# Patient Record
Sex: FEMALE | Race: WHITE | NOT HISPANIC OR LATINO | Employment: OTHER | URBAN - METROPOLITAN AREA
[De-identification: names, ages, dates, MRNs, and addresses within clinical notes are randomized per-mention and may not be internally consistent; named-entity substitution may affect disease eponyms.]

---

## 2018-05-23 DIAGNOSIS — M25.519 SHOULDER PAIN: Primary | ICD-10-CM

## 2018-05-28 ENCOUNTER — CLINICAL SUPPORT (OUTPATIENT)
Dept: REHABILITATION | Facility: HOSPITAL | Age: 68
End: 2018-05-28
Payer: MEDICARE

## 2018-05-28 DIAGNOSIS — G89.29 CHRONIC RIGHT SHOULDER PAIN: ICD-10-CM

## 2018-05-28 DIAGNOSIS — M25.611 DECREASED ROM OF RIGHT SHOULDER: ICD-10-CM

## 2018-05-28 DIAGNOSIS — M25.511 CHRONIC RIGHT SHOULDER PAIN: ICD-10-CM

## 2018-05-28 DIAGNOSIS — M62.81 MUSCLE WEAKNESS OF RIGHT UPPER EXTREMITY: ICD-10-CM

## 2018-05-28 PROCEDURE — G8984 CARRY CURRENT STATUS: HCPCS | Mod: CJ,PN

## 2018-05-28 PROCEDURE — 97161 PT EVAL LOW COMPLEX 20 MIN: CPT | Mod: PN

## 2018-05-28 PROCEDURE — G8985 CARRY GOAL STATUS: HCPCS | Mod: CH,PN

## 2018-05-28 NOTE — PLAN OF CARE
OUTPATIENT THERAPY AND WELLNESS  PHYSICAL THERAPY INITIAL EVALUATION    Name: Marleni Sousa  Clinic Number: [id number]    Evaluation Date: 5/28/2018  Visit #: 1 / 12  Authorization period Expiration: 12/31/2018  Plan of Care Expiration: 6/29/2018    Diagnosis:   Encounter Diagnoses   Name Primary?    Chronic right shoulder pain     Decreased ROM of right shoulder     Muscle weakness of right upper extremity      Physician: Catrachito Woods MD  Treatment Orders: PT Eval and Treat  PMHX: Right shoulder RTC tear/repair; Gastritis; Neurogenic Bladder; Trigeminal Neuralgia  Medicines:Cipro secondary to recent bladder surgery; Prednisone for inflammation; Flexeril  Review of patient's allergies indicates:: Lyrica; Benedryl;     History   Prior Therapy: Previous patient at this clinic before and after her Right shoulder RTC Repair and repair of Glenoid Fracture in February 2016  Social History: Patient lives alone; able to attend to her own needs; single story home;   Previous functional status: patient reports that she stays fairly active, does not perform any dedicated exercises;   Current functional status: sedentary; has not kept up with her shoulder exercises or any stretching. Recent bladder surgery -botox as well as other medical issues have kept her from doing too much.  Work: patient is retired    Subjective   History of Present Illness: patient stated that she started to notice a decrease in her right shoulder mobility around November of 2017.  Received 2 steroid injections - one in December 2017 and another one when she went to see Dr. Woods a week ago. She reports no falls or injury to her shoulder;   DOI: November 2017  Imaging, none:   Pain: current 5/10, worst 6/10, best 1/10, Aching, intermittent  Radicular symptoms: none identified  Aggravating factors: moving my arm  Easing factors: resting  Precautions: none  Pts goals: To regain my ROM and improve my strength.     Objective   Mental status:  alert, interactive, oriented x3  Posture/ Alignment: Good ; right shoulder noted to be more forward than left.    GAIT DEVIATIONS: Marleni amb with no obvious gait deviations noted. .    ROM:   Shoulder  Right   Left  Pain/Dysfunction with Movement    AROM PROM MMT AROM PROM MMT      flexion   135   146   4/5   162 WNL 5/5   End range pain   extension    43   55   4+/5   50 WNL 5/5   No c/o    abduction   130   138   4/5   160 WNL 5/5   End range pain   adduction   Able to reach to left post deltoid   28   4+/5  Able to reach behind right shoulder WNL 5/5   End range pain   Internal rotation Hand behind back to T8   74  4+/5  Hand behind back to Right scapula WNL 5/5   End range pain and restriction   ER at 90° abd Hand behind head   45   4-/5 hand behind head to  T-3  WNL 5/5   Pain thru movement   ER at 0° abd   46   76   4/5   85 WNL 5/5   End range pain     Strength: See note above; Scapular Stabilizer mm strength: 4/5;   Special Tests:  Negative for any RTC tear or labrum involvement; + impingement  Palpation:  Tender anterior aspect of right shoulder over long head of biceps;     Joint Play:  Able to perform grade III G/H joint mobilization with decreased joint play into IR/ER and distal glides; springy end feel noted; GIRD noted along with posterior capsule tightness    Pt/family was provided educational information, including: role of PT, goals for PT, scheduling - pt verbalized understanding. Discussed insurance plan with pt.     Functional Limitations Reporting     Category: carrying  Tool: UEFS   Score: 39% Limitation   Current/ : CJ = at least 20% but < 40% impaired, limited or restricted  Goal/ : CH = 0 % impaired, limited or restricted       Modifier  Impairment Limitation Restriction    CH  0 % impaired, limited or restricted    CI  @ least 1% but less than 20% impaired, limited or restricted    CJ  @ least 20%<40% impaired, limited or restricted    CK  @ least 40%<60% impaired, limited or  restricted    CL  @ least 60% <80% impaired, limited or restricted    CM  @ least 80%<100% impaired limited or restricted    CN  100% impaired, limited or restricted     TREATMENT   Time In: 10:10 AM  Time Out: 11:00 AM    Discussed Plan of Care with patient: Yes      Written Home Exercises Provided: yes - instructed patient to get back on her pulleys and start stretching her shoulder  Exercises were reviewed and Marleni was able to demonstrate them prior to the end of the session. Pt received a written copy of exercises to perform at home. Marleni demonstrated good  understanding of the education provided.     Assessment   Marleni is a 68 y.o. female referred to outpatient physical therapy with a medical diagnosis of right shoulder pain with decreased mobility due to inflammation, stiffness and scapulary dyskinesia. Demonstrates impairments including limitations as described in the problem list. Pt prognosis is Good. Positive prognostic factors include patient is familiar with shoulder rehab . Negative prognostic factors include none identified. Pt will benefit from skilled outpatient physical therapy to address the above stated deficits, provide pt/family education, and to maximize pt's level of independence.     History  Co-morbidities and personal factors that may impact the plan of care Examination  Body Structures and Functions, activity limitations and participation restrictions that may impact the plan of care    Clinical Presentation   Co-morbidities:   previous shoulder surgery; neurogenic bladder; trigeminel neuralgia, gastritis        Personal Factors:   no deficits Body Regions:   right shoulder    Body Systems:    ROM  strength            Participation Restrictions:   None identified     Activity limitations:   Learning and applying knowledge  no deficits    General Tasks and Commands  no deficits    Communication  no deficits    Mobility  lifting and carrying objects    Self care  no  deficits    Domestic Life  cooking  doing house work (cleaning house, washing dishes, laundry)    Interactions/Relationships  no deficits    Life Areas  no deficits    Community and Social Life  no deficits         stable and uncomplicated                      low   low  low Decision Making/ Complexity Score:  low         Anticipated Barriers for therapy: none identified  Pt's spiritual, cultural and educational needs considered and pt agreeable to plan of care and goals as stated below:     Short Term GOALS:  In 2 weeks, pt. will:  1. Subjective pain no more than 2/10 with daily activity  2. Able to demonstrate functional AROM in right shoulder thru all planes of motion  3. Strength improved by 1/2 grade in flexion, abduction and ER  4. UEFS score improved to <20% limitation  Long Term GOALS:  In 4 weeks, pt. Will:  1. Report subjective pain 0/10 in right shoulder with daily use  2. Demonstrate 5/5 strength in Right shoulder in all planes  3. UEFS score with 0% limitation  4. be independent with HEP and SX management     Plan   Certification Period: 5/28/2018 to 6/29/2018.    Outpatient physical therapy 2-3 times weekly to include: Manual Therapy, Moist Heat/ Ice, Neuromuscular Re-ed, Patient Education and Therapeutic Exercise. Cont PT for 1 months.   Pt may be seen by PTA as part of the rehabilitation team.     I certify the need for these services furnished under this plan of treatment and while under my care.    Sofía Martines, PT

## 2018-05-29 ENCOUNTER — CLINICAL SUPPORT (OUTPATIENT)
Dept: REHABILITATION | Facility: HOSPITAL | Age: 68
End: 2018-05-29
Payer: MEDICARE

## 2018-05-29 DIAGNOSIS — M25.611 DECREASED ROM OF RIGHT SHOULDER: ICD-10-CM

## 2018-05-29 DIAGNOSIS — G89.29 CHRONIC RIGHT SHOULDER PAIN: Primary | ICD-10-CM

## 2018-05-29 DIAGNOSIS — M25.511 CHRONIC RIGHT SHOULDER PAIN: Primary | ICD-10-CM

## 2018-05-29 DIAGNOSIS — M62.81 MUSCLE WEAKNESS OF RIGHT UPPER EXTREMITY: ICD-10-CM

## 2018-05-29 PROCEDURE — 97110 THERAPEUTIC EXERCISES: CPT | Mod: PN

## 2018-05-29 PROCEDURE — 97140 MANUAL THERAPY 1/> REGIONS: CPT | Mod: PN

## 2018-05-29 NOTE — PROGRESS NOTES
"                            Physical Therapy Daily Treatment Note     Name: Marleni Carpio [facility]  Clinic Number: [id number]    Therapy Diagnosis:  Physician: Catrachito Woods MD    Physician Orders: PT Eval and Tx  Medical Diagnosis: s/p RTC Repair, Stiffness, scapular dyskinesia Right Shoulder   Evaluation Date: 18  Authorization period Expiration: 18  Plan of Care Certification Period: 18    Visit #: 2/ Visits authorized: 12  Time In: 8:05 AM  Time Out: 9:15 AM  Total Billable Time: 45 minutes    Precautions: Standard    Subjective     Pt reports: she was compliant with home exercise program given last session.   Response to previous treatment:"My shoulder is just alittle sore"  Functional change: none    Pain: 4/10  Location: shoulder  right    Objective     HOMA received therapeutic exercises to develop strength and ROM for 30 minutes includin. UBE x 10 mins  2. Serratus Punches : 2# x 15 reps  3. Sidelying Ext Rotation: 2# x 15 reps  4. Prone: extension, rows and abduction: 2# x 15 reps each  5. Corner stretches for Pec Minor: 30 sec hold x 3 reps  6. Wall Slides - 3 mins  7. Wall Push-ups x 15 reps  8. 3-way Scapular Retraction - black TB 15 reps each  9. Forearm wall slides x 10 reps      HOMA received the following manual therapy techniques: Joint mobilizations were applied to the: right shoulder for 15 minutes, including:  Glenohumeral grade II to III jt mobilization to all planes;  Posterior capsule stretch; STM-sub-scapularis release; contract-relax shoulder abduction in side lying with stm of infraspinatous to promote greater ROM.      HOMA participated in neuromuscular re-education activities to improve:  for  minutes, including:     HOMA received the following supervised modalities after being cleared for contradictions: Moist Heat x 15 mins to right shoulder    Home Exercises Provided and Patient Education Provided     Education provided:   - progress towards goals   - role of " therapy       Written Home Exercises Provided: corner stretches, wall slides and pulleys to be performed daily.  Exercises were reviewed and   HOMA was able to demonstrate them prior to the end of the session.   Pt received a written copy of exercises to perform at home.     HOMA demonstrated good  understanding of the education provided.     Assessment       HOMA is progressing well towards her goals. Needs to focus on moving her shoulder through available ROM on a daily basis to reduce chance of developing a frozen shoulder.  Pt prognosis is Good.     Pt will continue to benefit from skilled outpatient physical therapy to address the deficits listed in the problem list box on initial evaluation, provide pt/family education and to maximize pt's level of independence in the home and community environment.     Anticipated barriers to physical therapy:     Pt's spiritual, cultural and educational needs considered and pt agreeable to plan of care and goals.    Goals:     Short Term GOALS:  In 2 weeks, pt. will:  1. Subjective pain no more than 2/10 with daily activity  2. Able to demonstrate functional AROM in right shoulder thru all planes of motion  3. Strength improved by 1/2 grade in flexion, abduction and ER  4. UEFS score improved to <20% limitation  Long Term GOALS:  In 4 weeks, pt. Will:  1. Report subjective pain 0/10 in right shoulder with daily use  2. Demonstrate 5/5 strength in Right shoulder in all planes  3. UEFS score with 0% limitation  4. be independent with HEP and SX management   Plan     Homa will be seen 2-3x/week x 4 weeks for right shoulder manual therapy, there exercise and modalities as needed for pain and.or inflammation.    Sofía Martines, PT

## 2018-06-01 ENCOUNTER — CLINICAL SUPPORT (OUTPATIENT)
Dept: REHABILITATION | Facility: HOSPITAL | Age: 68
End: 2018-06-01
Payer: MEDICARE

## 2018-06-01 DIAGNOSIS — G89.29 CHRONIC RIGHT SHOULDER PAIN: Primary | ICD-10-CM

## 2018-06-01 DIAGNOSIS — M25.611 DECREASED ROM OF RIGHT SHOULDER: ICD-10-CM

## 2018-06-01 DIAGNOSIS — M62.81 MUSCLE WEAKNESS OF RIGHT UPPER EXTREMITY: ICD-10-CM

## 2018-06-01 DIAGNOSIS — M25.511 CHRONIC RIGHT SHOULDER PAIN: Primary | ICD-10-CM

## 2018-06-01 PROCEDURE — 97140 MANUAL THERAPY 1/> REGIONS: CPT | Mod: PN

## 2018-06-01 PROCEDURE — 97110 THERAPEUTIC EXERCISES: CPT | Mod: PN

## 2018-06-01 NOTE — PROGRESS NOTES
"                            Physical Therapy Daily Treatment Note     Name: Marleni Carpio [facility] Number: [id number]    Therapy Diagnosis:  Physician: Catrachito Woods MD    Physician Orders: PT Eval and Tx  Medical Diagnosis: s/p RTC Repair, Stiffness, scapular dyskinesia Right Shoulder   Evaluation Date: 18  Authorization period Expiration: 18  Plan of Care Certification Period: 18    Visit #: 3/ Visits authorized: 12  Time In: 10:00  Time Out: 11:00  Total Billable Time: 45 minutes    Precautions: Standard    Subjective     Pt reports: she was compliant with home exercise program given last session.  She went to do some aquatic exercise yesterday - alittle sore from this as well.  Response to previous treatment:"My shoulder is just alittle sore"  Functional change: none    Pain: 4/10  Location: shoulder  right    Objective     HOMA received therapeutic exercises to develop strength and ROM for 30 minutes includin. UBE x 10 mins  2. Serratus Punches : 2# x 15 reps  3. Sidelying Ext Rotation: 2# x 15 reps  4. Prone: extension, rows and abduction: 2# x 15 reps each  5. Corner stretches for Pec Minor: 30 sec hold x 3 reps  6. Wall Slides - 3 mins  7. Wall Push-ups x 15 reps  8. 3-way Scapular Retraction - black TB 15 reps each  9. Forearm wall slides x 10 reps      HOMA received the following manual therapy techniques: Joint mobilizations were applied to the: right shoulder for 15 minutes, including:  Glenohumeral grade II to III jt mobilization to all planes;  Posterior capsule stretch; STM-sub-scapularis release; contract-relax shoulder abduction in side lying with stm of infraspinatous to promote greater ROM.      HOMA participated in neuromuscular re-education activities to improve:  for  minutes, including:     HOMA received the following supervised modalities after being cleared for contradictions: Moist Heat x 15 mins to right shoulder    Home Exercises Provided and Patient Education " Provided     Education provided:   - progress towards goals   - role of therapy       Written Home Exercises Provided: corner stretches, wall slides and pulleys to be performed daily.  Exercises were reviewed and   HOMA was able to demonstrate them prior to the end of the session.   Pt received a written copy of exercises to perform at home.     HOMA demonstrated good  understanding of the education provided.     Assessment       HOMA is progressing well towards her goals. Needs to focus on moving her shoulder through available ROM on a daily basis to reduce chance of developing a frozen shoulder.  Pt prognosis is Good.     Pt will continue to benefit from skilled outpatient physical therapy to address the deficits listed in the problem list box on initial evaluation, provide pt/family education and to maximize pt's level of independence in the home and community environment.     Anticipated barriers to physical therapy:     Pt's spiritual, cultural and educational needs considered and pt agreeable to plan of care and goals.    Goals:     Short Term GOALS:  In 2 weeks, pt. will:  1. Subjective pain no more than 2/10 with daily activity  2. Able to demonstrate functional AROM in right shoulder thru all planes of motion  3. Strength improved by 1/2 grade in flexion, abduction and ER  4. UEFS score improved to <20% limitation  Long Term GOALS:  In 4 weeks, pt. Will:  1. Report subjective pain 0/10 in right shoulder with daily use  2. Demonstrate 5/5 strength in Right shoulder in all planes  3. UEFS score with 0% limitation  4. be independent with HEP and SX management   Plan     Homa will be seen 2-3x/week x 4 weeks for right shoulder manual therapy, there exercise and modalities as needed for pain and.or inflammation.    Sofía Martines, PT

## 2018-06-04 ENCOUNTER — CLINICAL SUPPORT (OUTPATIENT)
Dept: REHABILITATION | Facility: HOSPITAL | Age: 68
End: 2018-06-04
Payer: MEDICARE

## 2018-06-04 DIAGNOSIS — M25.611 DECREASED ROM OF RIGHT SHOULDER: ICD-10-CM

## 2018-06-04 DIAGNOSIS — M62.81 MUSCLE WEAKNESS OF RIGHT UPPER EXTREMITY: ICD-10-CM

## 2018-06-04 DIAGNOSIS — G89.29 CHRONIC RIGHT SHOULDER PAIN: Primary | ICD-10-CM

## 2018-06-04 DIAGNOSIS — M25.511 CHRONIC RIGHT SHOULDER PAIN: Primary | ICD-10-CM

## 2018-06-04 PROCEDURE — 97140 MANUAL THERAPY 1/> REGIONS: CPT | Mod: PN

## 2018-06-04 PROCEDURE — 97110 THERAPEUTIC EXERCISES: CPT | Mod: PN

## 2018-06-04 NOTE — PROGRESS NOTES
"                            Physical Therapy Daily Treatment Note     Name: Marleni Carpio [facility]  Clinic Number: [id number]    Therapy Diagnosis:  Physician: Catrachito Woods MD    Physician Orders: PT Eval and Tx  Medical Diagnosis: s/p RTC Repair, Stiffness, scapular dyskinesia Right Shoulder   Evaluation Date: 18  Authorization period Expiration: 18  Plan of Care Certification Period: 18    Visit #: 4/ Visits authorized: 12  Time In: 9:00 AM  Time Out: 10: 20 AM  Total Billable Time: 45 minutes    Precautions: Standard    Subjective     Pt reports: she was compliant with home exercise program given last session - as far as pulleys concerned. She stated that she hasn't started using her bands yet. Encouraged her to start with same exercises she is doing in the clinic.   Response to previous treatment:"My shoulder is just alittle sore"  Functional change: none    Pain: 4/10  Location: shoulder  right    Objective     HOMA received therapeutic exercises to develop strength and ROM for 30 minutes includin. UBE x 10 mins  2. Serratus Punches : 2# x 15 reps  3. Sidelying Ext Rotation: 2# x 15 reps  4. Prone: extension, rows and abduction: 2# x 15 reps each  5. Corner stretches for Pec Minor: 30 sec hold x 3 reps  6. Wall Slides - 3 mins  7. Wall Push-ups x 15 reps  8. 3-way Scapular Retraction - black TB 15 reps each  9. Forearm wall slides x 10 reps  10. Standing shoulder IR/ER:  Green TB - 15 reps each      HOMA received the following manual therapy techniques: Joint mobilizations were applied to the: right shoulder for 15 minutes, including:  Glenohumeral grade II to III jt mobilization to all planes;  Posterior capsule stretch; STM-sub-scapularis release; contract-relax shoulder abduction in side lying with stm of infraspinatous to promote greater ROM.      HOMA participated in neuromuscular re-education activities to improve:  for  minutes, including:     HOMA received the following supervised " modalities after being cleared for contradictions: Moist Heat x 15 mins to right shoulder    Home Exercises Provided and Patient Education Provided     Education provided:   - progress towards goals   - role of therapy       Written Home Exercises Provided: corner stretches, wall slides and pulleys to be performed daily.  Exercises were reviewed and   HOMA was able to demonstrate them prior to the end of the session.   Pt received a written copy of exercises to perform at home.     HOMA demonstrated good  understanding of the education provided.     Assessment       HOMA is progressing well towards her goals.Reminder to continue to work on shoulder ROM - Homa is not good with follow through of exercises.  Needs to focus on moving her shoulder through available ROM on a daily basis to reduce chance of developing a frozen shoulder.  Pt prognosis is Good.     Pt will continue to benefit from skilled outpatient physical therapy to address the deficits listed in the problem list box on initial evaluation, provide pt/family education and to maximize pt's level of independence in the home and community environment.     Anticipated barriers to physical therapy:     Pt's spiritual, cultural and educational needs considered and pt agreeable to plan of care and goals.    Goals:     Short Term GOALS:  In 2 weeks, pt. will:  1. Subjective pain no more than 2/10 with daily activity  2. Able to demonstrate functional AROM in right shoulder thru all planes of motion  3. Strength improved by 1/2 grade in flexion, abduction and ER  4. UEFS score improved to <20% limitation  Long Term GOALS:  In 4 weeks, pt. Will:  1. Report subjective pain 0/10 in right shoulder with daily use  2. Demonstrate 5/5 strength in Right shoulder in all planes  3. UEFS score with 0% limitation  4. be independent with HEP and SX management   Plan     Homa will be seen 2-3x/week x 4 weeks for right shoulder manual therapy, there exercise and modalities as  needed for pain and.or inflammation.    Sofía Martines, PT

## 2018-06-07 ENCOUNTER — CLINICAL SUPPORT (OUTPATIENT)
Dept: REHABILITATION | Facility: HOSPITAL | Age: 68
End: 2018-06-07
Payer: MEDICARE

## 2018-06-07 DIAGNOSIS — M25.611 DECREASED ROM OF RIGHT SHOULDER: ICD-10-CM

## 2018-06-07 DIAGNOSIS — G89.29 CHRONIC RIGHT SHOULDER PAIN: Primary | ICD-10-CM

## 2018-06-07 DIAGNOSIS — M62.81 MUSCLE WEAKNESS OF RIGHT UPPER EXTREMITY: ICD-10-CM

## 2018-06-07 DIAGNOSIS — M25.511 CHRONIC RIGHT SHOULDER PAIN: Primary | ICD-10-CM

## 2018-06-07 PROCEDURE — 97110 THERAPEUTIC EXERCISES: CPT | Mod: PN

## 2018-06-07 PROCEDURE — 97140 MANUAL THERAPY 1/> REGIONS: CPT | Mod: PN

## 2018-06-07 NOTE — PROGRESS NOTES
"                            Physical Therapy Daily Treatment Note     Name: Marleni Carpio [facility]  Clinic Number: [id number]    Therapy Diagnosis:  Physician: Catrachito Woods MD    Physician Orders: PT Eval and Tx  Medical Diagnosis: s/p RTC Repair, Stiffness, scapular dyskinesia Right Shoulder   Evaluation Date: 18  Authorization period Expiration: 18  Plan of Care Certification Period: 18    Visit #: 5/ Visits authorized: 12  Time In: 10:10 AM  Time Out: 11:20  Total Billable Time: 30 minutes    Precautions: Standard    Subjective     Pt reports: she was compliant with home exercise program given last session - as far as pulleys concerned. She has performed some aquatic exercises over the last week with her shoulder.    Response to previous treatment:"My shoulder is just alittle sore"  Functional change: none    Pain: 4/10  Location: shoulder  right    Objective     HOMA received therapeutic exercises to develop strength and ROM for 30 minutes includin. UBE x 10 mins  2. Serratus Punches : 2# x 15 reps  3. Sidelying Ext Rotation: 2# x 15 reps  4. Prone: extension, rows and abduction: 2# x 15 reps each  5. Corner stretches for Pec Minor: 30 sec hold x 3 reps  6. Wall Slides - 3 mins  7. Wall Push-ups x 15 reps  8. 3-way Scapular Retraction - black TB 15 reps each  9. Forearm wall slides x 10 reps  10. Standing shoulder IR/ER:  Green TB - 15 reps each      HOMA received the following manual therapy techniques: Joint mobilizations were applied to the: right shoulder for 15 minutes, including:  Glenohumeral grade II to III jt mobilization to all planes;  Posterior capsule stretch; STM-sub-scapularis release; contract-relax shoulder abduction in side lying with stm of infraspinatous to promote greater ROM.      HOMA participated in neuromuscular re-education activities to improve:  for  minutes, including:     HOMA received the following supervised modalities after being cleared for contradictions: " Moist Heat x 15 mins to right shoulder    Home Exercises Provided and Patient Education Provided     Education provided:   - progress towards goals   - role of therapy       Written Home Exercises Provided: corner stretches, wall slides and pulleys to be performed daily.  Exercises were reviewed and   HOMA was able to demonstrate them prior to the end of the session.   Pt received a written copy of exercises to perform at home.     HOMA demonstrated good  understanding of the education provided.     Assessment       HOMA is progressing well towards her goals.Reminder to continue to work on shoulder ROM - Homa is not good with follow through of exercises.  Needs to focus on moving her shoulder through available ROM on a daily basis to reduce chance of developing a frozen shoulder.  Pt prognosis is Good.     Pt will continue to benefit from skilled outpatient physical therapy to address the deficits listed in the problem list box on initial evaluation, provide pt/family education and to maximize pt's level of independence in the home and community environment.     Anticipated barriers to physical therapy:     Pt's spiritual, cultural and educational needs considered and pt agreeable to plan of care and goals.    Goals:     Short Term GOALS:  In 2 weeks, pt. will:  1. Subjective pain no more than 2/10 with daily activity  2. Able to demonstrate functional AROM in right shoulder thru all planes of motion  3. Strength improved by 1/2 grade in flexion, abduction and ER  4. UEFS score improved to <20% limitation  Long Term GOALS:  In 4 weeks, pt. Will:  1. Report subjective pain 0/10 in right shoulder with daily use  2. Demonstrate 5/5 strength in Right shoulder in all planes  3. UEFS score with 0% limitation  4. be independent with HEP and SX management   Plan     Homa will be seen 2-3x/week x 4 weeks for right shoulder manual therapy, there exercise and modalities as needed for pain and.or inflammation.    Sofía Martines, PT

## 2018-06-11 ENCOUNTER — CLINICAL SUPPORT (OUTPATIENT)
Dept: REHABILITATION | Facility: HOSPITAL | Age: 68
End: 2018-06-11
Payer: MEDICARE

## 2018-06-11 DIAGNOSIS — M62.81 MUSCLE WEAKNESS OF RIGHT UPPER EXTREMITY: Primary | ICD-10-CM

## 2018-06-11 DIAGNOSIS — M25.611 DECREASED ROM OF RIGHT SHOULDER: ICD-10-CM

## 2018-06-11 PROCEDURE — 97010 HOT OR COLD PACKS THERAPY: CPT | Mod: PN

## 2018-06-11 PROCEDURE — 97140 MANUAL THERAPY 1/> REGIONS: CPT | Mod: PN

## 2018-06-11 PROCEDURE — 97110 THERAPEUTIC EXERCISES: CPT | Mod: PN

## 2018-06-11 NOTE — PROGRESS NOTES
"                            Physical Therapy Daily Treatment Note     Name: Marlein Carpio [facility] Number: [id number]    Therapy Diagnosis:  Physician: Catrachito Woods MD    Physician Orders: PT Eval and Tx  Medical Diagnosis: s/p RTC Repair, Stiffness, scapular dyskinesia Right Shoulder   Evaluation Date: 18  Authorization period Expiration: 18  Plan of Care Certification Period: 18    Visit #: 6/ Visits authorized: 12  Time In: 8:00 AM  Time Out: 9:45  Total Billable Time: 45 minutes    Precautions: Standard    Subjective     Pt reports: No new c/o's   Response to previous treatment:"My shoulder is just alittle sore"  Functional change: none    Pain: 510  Location: shoulder  right    Objective     HOMA received therapeutic exercises to develop strength and ROM for 30 minutes includin. UBE x 10 mins  2. Serratus Punches : 2# x 15 reps  3. Sidelying Ext Rotation: 2# x 15 reps  4. Prone: extension, rows and abduction: 2# x 15 reps each  5. Corner stretches for Pec Minor: 30 sec hold x 3 reps  6. Wall Slides - 3 mins  7. Wall Push-ups x 15 reps  8. 3-way Scapular Retraction - black TB 15 reps each  9. Forearm wall slides x 10 reps  10. Standing shoulder IR/ER:  Green TB - 15 reps each      HOMA received the following manual therapy techniques: Joint mobilizations were applied to the: right shoulder for 15 minutes, including:  Glenohumeral grade II to III jt mobilization to all planes;  Posterior capsule stretch; STM-sub-scapularis release; contract-relax shoulder abduction in side lying with stm of infraspinatous to promote greater ROM.      HOMA participated in neuromuscular re-education activities to improve:  for  minutes, including:     HOMA received the following supervised modalities after being cleared for contradictions: Moist Heat x 15 mins to right shoulder    Home Exercises Provided and Patient Education Provided     Education provided:   - progress towards goals   - role of " therapy       Written Home Exercises Provided: corner stretches, wall slides and pulleys to be performed daily.  Exercises were reviewed and   HOMA was able to demonstrate them prior to the end of the session.   Pt received a written copy of exercises to perform at home.     HOMA demonstrated good  understanding of the education provided.     Assessment       HOMA is progressing well towards her goals.Reminder to continue to work on shoulder ROM - Homa is not good with follow through of exercises.  Needs to focus on moving her shoulder through available ROM on a daily basis to reduce chance of developing a frozen shoulder.  Pt prognosis is Good.     Pt will continue to benefit from skilled outpatient physical therapy to address the deficits listed in the problem list box on initial evaluation, provide pt/family education and to maximize pt's level of independence in the home and community environment.     Anticipated barriers to physical therapy:     Pt's spiritual, cultural and educational needs considered and pt agreeable to plan of care and goals.    Goals:     Short Term GOALS:  In 2 weeks, pt. will:  1. Subjective pain no more than 2/10 with daily activity  2. Able to demonstrate functional AROM in right shoulder thru all planes of motion  3. Strength improved by 1/2 grade in flexion, abduction and ER  4. UEFS score improved to <20% limitation  Long Term GOALS:  In 4 weeks, pt. Will:  1. Report subjective pain 0/10 in right shoulder with daily use  2. Demonstrate 5/5 strength in Right shoulder in all planes  3. UEFS score with 0% limitation  4. be independent with HEP and SX management   Plan     Homa will be seen 2-3x/week x 4 weeks for right shoulder manual therapy, there exercise and modalities as needed for pain and.or inflammation.    Jonathan Favre, PTA

## 2018-06-12 ENCOUNTER — CLINICAL SUPPORT (OUTPATIENT)
Dept: REHABILITATION | Facility: HOSPITAL | Age: 68
End: 2018-06-12
Payer: MEDICARE

## 2018-06-12 DIAGNOSIS — M25.611 DECREASED ROM OF RIGHT SHOULDER: ICD-10-CM

## 2018-06-12 DIAGNOSIS — M62.81 MUSCLE WEAKNESS OF RIGHT UPPER EXTREMITY: ICD-10-CM

## 2018-06-12 DIAGNOSIS — M25.511 CHRONIC RIGHT SHOULDER PAIN: Primary | ICD-10-CM

## 2018-06-12 DIAGNOSIS — G89.29 CHRONIC RIGHT SHOULDER PAIN: Primary | ICD-10-CM

## 2018-06-12 PROCEDURE — 97140 MANUAL THERAPY 1/> REGIONS: CPT | Mod: PN

## 2018-06-12 PROCEDURE — 97110 THERAPEUTIC EXERCISES: CPT | Mod: PN

## 2018-06-12 NOTE — PROGRESS NOTES
"                            Physical Therapy Daily Treatment Note     Name: Marleni Caprio [facility] Number: [id number]    Therapy Diagnosis:  Physician: Catrachito Woods MD    Physician Orders: PT Eval and Tx  Medical Diagnosis: s/p RTC Repair, Stiffness, scapular dyskinesia Right Shoulder   Evaluation Date: 18  Authorization period Expiration: 18  Plan of Care Certification Period: 18    Visit #: 6/ Visits authorized: 12  Time In: 9:00 AM  Time Out:  10:20 AM  Total Billable Time: 30 minutes    Precautions: Standard    Subjective     Pt reports: No new c/o's  Homa stated that she goes back to see Dr. Woods sometime next week.  Response to previous treatment:"My shoulder is just alittle sore"  Functional change: none    Pain: 4-5/10  Location: shoulder  right    Objective     HOMA received therapeutic exercises to develop strength and ROM for 30 minutes includin. UBE x 10 mins  2. Serratus Punches : 2# x 15 reps  3. Sidelying Ext Rotation: 2# x 15 reps  4. Prone: extension, rows and abduction: 2# x 15 reps each  5. Corner stretches for Pec Minor: 30 sec hold x 3 reps  6. Wall Slides - 3 mins  7. Wall Push-ups x 15 reps  8. 3-way Scapular Retraction - black TB 15 reps each  9. Forearm wall slides x 10 reps  10. Standing shoulder IR/ER:  Green TB - 15 reps each - changed ER to Red-TB  10 x 2 - verbal and tactile cueing for better posture and technique to move scapua, not rotate trunk.      HOMA received the following manual therapy techniques: Joint mobilizations were applied to the: right shoulder for 15 minutes, including:  Glenohumeral grade II to III jt mobilization to all planes;  Posterior capsule stretch; STM-sub-scapularis release; contract-relax shoulder abduction in side lying with stm of infraspinatous to promote greater ROM.      HOMA participated in neuromuscular re-education activities to improve:  for  minutes, including:     HOMA received the following supervised modalities " after being cleared for contradictions: Moist Heat x 15 mins to right shoulder    Home Exercises Provided and Patient Education Provided     Education provided:   - progress towards goals   - role of therapy       Written Home Exercises Provided: corner stretches, wall slides and pulleys to be performed daily.  Exercises were reviewed and   HOMA was able to demonstrate them prior to the end of the session.   Pt received a written copy of exercises to perform at home.     HOMA demonstrated good  understanding of the education provided.     Assessment       HOMA is progressing well towards her goals.Reminder to continue to work on shoulder ROM - Homa is not good with follow through of exercises.  Needs to focus on moving her shoulder through available ROM on a daily basis to reduce chance of developing a frozen shoulder. Discussed scapular streghtening as well as pec mm stretching.   Pt prognosis is Good.     Pt will continue to benefit from skilled outpatient physical therapy to address the deficits listed in the problem list box on initial evaluation, provide pt/family education and to maximize pt's level of independence in the home and community environment.     Anticipated barriers to physical therapy:     Pt's spiritual, cultural and educational needs considered and pt agreeable to plan of care and goals.    Goals:     Short Term GOALS:  In 2 weeks, pt. will:  1. Subjective pain no more than 2/10 with daily activity  2. Able to demonstrate functional AROM in right shoulder thru all planes of motion  3. Strength improved by 1/2 grade in flexion, abduction and ER  4. UEFS score improved to <20% limitation  Long Term GOALS:  In 4 weeks, pt. Will:  1. Report subjective pain 0/10 in right shoulder with daily use  2. Demonstrate 5/5 strength in Right shoulder in all planes  3. UEFS score with 0% limitation  4. be independent with HEP and SX management   Plan     Homa will be seen 2-3x/week x 4 weeks for right shoulder  manual therapy, there exercise and modalities as needed for pain and.or inflammation.    Sofía Martines, PT

## 2018-06-14 ENCOUNTER — CLINICAL SUPPORT (OUTPATIENT)
Dept: REHABILITATION | Facility: HOSPITAL | Age: 68
End: 2018-06-14
Payer: MEDICARE

## 2018-06-14 DIAGNOSIS — M25.511 CHRONIC RIGHT SHOULDER PAIN: Primary | ICD-10-CM

## 2018-06-14 DIAGNOSIS — M25.611 DECREASED ROM OF RIGHT SHOULDER: ICD-10-CM

## 2018-06-14 DIAGNOSIS — G89.29 CHRONIC RIGHT SHOULDER PAIN: Primary | ICD-10-CM

## 2018-06-14 DIAGNOSIS — M62.81 MUSCLE WEAKNESS OF RIGHT UPPER EXTREMITY: ICD-10-CM

## 2018-06-14 PROCEDURE — 97110 THERAPEUTIC EXERCISES: CPT | Mod: PN

## 2018-06-14 PROCEDURE — 97140 MANUAL THERAPY 1/> REGIONS: CPT | Mod: PN

## 2018-06-14 NOTE — PROGRESS NOTES
"                            Physical Therapy Daily Treatment Note     Name: Marleni Carpio [facility]  Clinic Number: [id number]    Therapy Diagnosis:  Physician: Catrachito Woods MD    Physician Orders: PT Eval and Tx  Medical Diagnosis: s/p RTC Repair, Stiffness, scapular dyskinesia Right Shoulder   Evaluation Date: 18  Authorization period Expiration: 18  Plan of Care Certification Period: 18    Visit #: 7/ Visits authorized: 12  Time In: 1:00 PM  Time Out:  2:20 PM  Total Billable Time: 45 minutes    Precautions: Standard    Subjective     Pt reports: "I'm tired today"  Homa reports that she has been busy this week, just feels worn out.  She is going to see Dr. Woods on .    Response to previous treatment:"My shoulder is just alittle sore"  Functional change: none    Pain: 10  Location: shoulder  right    Objective     HOMA received therapeutic exercises to develop strength and ROM for 30 minutes includin. UBE x 10 mins  2. Serratus Punches : 2# x 15 reps  3. Sidelying Ext Rotation: 2# x 15 reps  4. Prone: extension, rows and abduction: 2# x 15 reps each  5. Corner stretches for Pec Minor: 30 sec hold x 3 reps  6. Wall Slides - 3 mins  7. Wall Push-ups x 15 reps  8. 3-way Scapular Retraction - black TB 15 reps each  9. Forearm wall slides x 10 reps  10. Standing shoulder IR/ER:  Green TB - 15 reps each - changed ER to Red-TB  10 x 2 - verbal and tactile cueing for better posture and technique to move scapua, not rotate trunk.      HOMA received the following manual therapy techniques: Joint mobilizations were applied to the: right shoulder for 25 minutes, including:  Glenohumeral grade II to III jt mobilization to all planes;  Posterior capsule stretch; STM-sub-scapularis release; contract-relax shoulder abduction in side lying with stm of infraspinatous to promote greater ROM.  Scapular mobilization in all planes in sidelying; Lateral glides to bring scapula away from thorax and " stretch pec minor on Right side.       HOMA participated in neuromuscular re-education activities to improve:  for  minutes, including:     HOMA received the following supervised modalities after being cleared for contradictions: Moist Heat x 15 mins to right shoulder    Home Exercises Provided and Patient Education Provided     Education provided:   - progress towards goals   - role of therapy       Written Home Exercises Provided: corner stretches, wall slides and pulleys to be performed daily.  Exercises were reviewed and   HOMA was able to demonstrate them prior to the end of the session.   Pt received a written copy of exercises to perform at home.     HOMA demonstrated good  understanding of the education provided.     Assessment       HOMA is progressing well towards her goals.Reminder to continue to work on shoulder ROM - Homa is not good with follow through of exercises.  Needs to focus on moving her shoulder through available ROM on a daily basis to reduce chance of developing a frozen shoulder. Discussed scapular streghtening as well as pec mm stretching.     Current ROM Measurement (6/14/18)    ROM:   Shoulder  5/28/2018 Right    6/14/2018 Right   Pain/Dysfunction with Movement     AROM PROM MMT AROM PROM MMT      flexion   135   146   4/5   146 148 4/5   End range pain   extension    43   55   4+/5     46   55 4+/5   No c/o    abduction   130   138   4/5    132 142 4/5   End range pain   adduction   Able to reach to left post deltoid   28   4+/5  Able to reach to left posterior deltoid 28 4+/5   End range pain   Internal rotation Hand behind back to T8   74  4+/5  Hand behind back to T8 78 4+/5  No Pain   ER at 90° abd Hand behind head   45   4-/5 Hand behind head to C7 65 4-/5   Pain thru movement   ER at 0° abd   46   76   4/5 73 76 4-/5   End range pain            Pt prognosis is Good.     Pt will continue to benefit from skilled outpatient physical therapy to address the deficits listed in the  problem list box on initial evaluation, provide pt/family education and to maximize pt's level of independence in the home and community environment.     Anticipated barriers to physical therapy:     Pt's spiritual, cultural and educational needs considered and pt agreeable to plan of care and goals.    Goals:     Short Term GOALS:  In 2 weeks, pt. will:  1. Subjective pain no more than 2/10 with daily activity  2. Able to demonstrate functional AROM in right shoulder thru all planes of motion  3. Strength improved by 1/2 grade in flexion, abduction and ER  4. UEFS score improved to <20% limitation  Long Term GOALS:  In 4 weeks, pt. Will:  1. Report subjective pain 0/10 in right shoulder with daily use  2. Demonstrate 5/5 strength in Right shoulder in all planes  3. UEFS score with 0% limitation  4. be independent with HEP and SX management   Plan     Balaji will be seen 2-3x/week x 4 weeks for right shoulder manual therapy, there exercise and modalities as needed for pain and.or inflammation.    Sofía Martines, PT

## 2018-06-18 ENCOUNTER — CLINICAL SUPPORT (OUTPATIENT)
Dept: REHABILITATION | Facility: HOSPITAL | Age: 68
End: 2018-06-18
Payer: MEDICARE

## 2018-06-18 DIAGNOSIS — M62.81 MUSCLE WEAKNESS OF RIGHT UPPER EXTREMITY: ICD-10-CM

## 2018-06-18 DIAGNOSIS — M25.611 DECREASED ROM OF RIGHT SHOULDER: ICD-10-CM

## 2018-06-18 DIAGNOSIS — M25.511 CHRONIC RIGHT SHOULDER PAIN: Primary | ICD-10-CM

## 2018-06-18 DIAGNOSIS — G89.29 CHRONIC RIGHT SHOULDER PAIN: Primary | ICD-10-CM

## 2018-06-18 PROCEDURE — 97110 THERAPEUTIC EXERCISES: CPT | Mod: PN

## 2018-06-18 PROCEDURE — 97140 MANUAL THERAPY 1/> REGIONS: CPT | Mod: PN

## 2018-06-18 NOTE — PROGRESS NOTES
"                            Physical Therapy Daily Treatment Note     Name: Marleni Carpio [facility] Number: [id number]    Therapy Diagnosis:  Physician: Catrachito Woods MD    Physician Orders: PT Eval and Tx  Medical Diagnosis: s/p RTC Repair, Stiffness, scapular dyskinesia Right Shoulder   Evaluation Date: 18  Authorization period Expiration: 18  Plan of Care Certification Period: 18    Visit #: 9/ Visits authorized: 12  Time In: 10:00 AM  Time Out:  11:20 AM  Total Billable Time: 30 minutes    Precautions: Standard    Subjective     Pt reports: Shoulder about the same. Still has some pain at end range with limited movement.  Homa is going back to Dr. Woods tomorrow.   Response to previous treatment:"My shoulder is just alittle sore"  Functional change: none    Pain: 4/10  Location: shoulder  right    Objective     HOMA received therapeutic exercises to develop strength and ROM for 30 minutes includin. UBE x 10 mins  2. Serratus Punches : 2# x 15 reps  3. Sidelying Ext Rotation: 2# x 15 reps  4. Prone: extension, rows and abduction: 2# x 15 reps each  5. Corner stretches for Pec Minor: 30 sec hold x 3 reps  6. Wall Slides - 3 mins  7. Wall Push-ups x 15 reps  8. 3-way Scapular Retraction - black TB 15 reps each  9. Forearm wall slides x 10 reps  10. Standing shoulder IR/ER:  Green TB - 15 reps each - changed ER to Red-TB  10 x 2 - verbal and tactile cueing for better posture and technique to move scapua, not rotate trunk.      HOMA received the following manual therapy techniques: Joint mobilizations were applied to the: right shoulder for 25 minutes, including:  Glenohumeral grade II to III jt mobilization to all planes;  Posterior capsule stretch; STM-sub-scapularis release; contract-relax shoulder abduction in side lying with stm of infraspinatous to promote greater ROM.  Scapular mobilization in all planes in sidelying; Lateral glides to bring scapula away from thorax and stretch pec " minor on Right side.       HOMA participated in neuromuscular re-education activities to improve:  for  minutes, including:     HOMA received the following supervised modalities after being cleared for contradictions: Moist Heat x 15 mins to right shoulder    Home Exercises Provided and Patient Education Provided     Education provided:   - progress towards goals   - role of therapy       Written Home Exercises Provided: corner stretches, wall slides and pulleys to be performed daily.  Exercises were reviewed and   HOMA was able to demonstrate them prior to the end of the session.   Pt received a written copy of exercises to perform at home.     HOMA demonstrated good  understanding of the education provided.     Assessment       HOMA is progressing well towards her goals.Reminder to continue to work on shoulder ROM - Homa is not as consistent as she needs to be with follow through of exercises. Encouraged her to stretch daily.    See current ROM measurements below - improvement noted in active flexion and external rotation.  She continues to have pain at end range flexion, abduction and external rotation, but again, is slowly improving. Weakness noted throughout shoulder girdle - more so posterior mm groups.     Current ROM Measurement (6/14/18)    ROM:   Shoulder  5/28/2018 Right    6/14/2018 Right   Pain/Dysfunction with Movement     AROM PROM MMT AROM PROM MMT      flexion   135   146   4/5   146 148 4/5   End range pain   extension    43   55   4+/5     46   55 4+/5   No c/o    abduction   130   138   4/5    132 142 4/5   End range pain   adduction   Able to reach to left post deltoid   28   4+/5  Able to reach to left posterior deltoid 28 4+/5   End range pain   Internal rotation Hand behind back to T8   74  4+/5  Hand behind back to T8 78 4+/5  No Pain   ER at 90° abd Hand behind head   45   4-/5 Hand behind head to C7 65 4-/5   Pain thru movement   ER at 0° abd   46   76   4/5 73 76 4-/5   End range pain             Pt prognosis is Good.     Pt will continue to benefit from skilled outpatient physical therapy to address the deficits listed in the problem list box on initial evaluation, provide pt/family education and to maximize pt's level of independence in the home and community environment. She is making progress toward her goals, but has not achieved the 2 week goals established.  Skilled Physical Therapy intervention is still appropriate - current goals remain appropriate.     Anticipated barriers to physical therapy:     Pt's spiritual, cultural and educational needs considered and pt agreeable to plan of care and goals.    Goals:     Short Term GOALS:  In 2 weeks, pt. will:  1. Subjective pain no more than 2/10 with daily activity - NOT MET  2. Able to demonstrate functional AROM in right shoulder thru all planes of motion - Improving, but still difficulty with range  3. Strength improved by 1/2 grade in flexion, abduction and ER - NOT MET  4. UEFS score improved to <20% limitation - NOT MET  Long Term GOALS:  In 4 weeks, pt. Will:  1. Report subjective pain 0/10 in right shoulder with daily use  2. Demonstrate 5/5 strength in Right shoulder in all planes  3. UEFS score with 0% limitation  4. be independent with HEP and SX management   Plan     Balaji will be seen 2-3x/week x 4 weeks for right shoulder manual therapy, there exercise and modalities as needed for pain and.or inflammation.    Sofía Martines, PT

## 2018-06-20 ENCOUNTER — CLINICAL SUPPORT (OUTPATIENT)
Dept: REHABILITATION | Facility: HOSPITAL | Age: 68
End: 2018-06-20
Payer: MEDICARE

## 2018-06-20 PROCEDURE — G8985 CARRY GOAL STATUS: HCPCS | Mod: CH,PN

## 2018-06-20 PROCEDURE — 97140 MANUAL THERAPY 1/> REGIONS: CPT | Mod: PN

## 2018-06-20 PROCEDURE — G8984 CARRY CURRENT STATUS: HCPCS | Mod: CJ,PN

## 2018-06-20 PROCEDURE — 97110 THERAPEUTIC EXERCISES: CPT | Mod: PN

## 2018-06-21 NOTE — PROGRESS NOTES
"                            Physical Therapy Daily Treatment Note     Name: Marleni Carpio [facility] Number: [id number]    Therapy Diagnosis:  Physician: Catrachito Woods MD    Physician Orders: PT Eval and Tx  Medical Diagnosis: s/p RTC Repair, Stiffness, scapular dyskinesia Right Shoulder   Evaluation Date: 18  Authorization period Expiration: 18  Plan of Care Certification Period: 18    Visit #: 10 / Visits authorized: 12  Time In:  3:00 PM  Time Out:  4:15 PM  Total Billable Time: 30 minutes    Precautions: Standard    Subjective     Pt reports: Saw Dr. Woods the other day, new orders for 2x/week x 12 weeks - or whenever I felt she was ready to be discharged to [facility].  Homa stated that Dr. Woods was very happy with her progress thus far;    Response to previous treatment:"My shoulder is just alittle sore"  Functional change: none    Pain: 4/10  Location: shoulder  right    Objective     HOMA received therapeutic exercises to develop strength and ROM for 30 minutes includin. UBE x 10 mins  2. Serratus Punches : 2# x 15 reps  3. Sidelying Ext Rotation: 2# x 15 reps  4. Prone: extension, rows and abduction: 2# x 15 reps each  5. Corner stretches for Pec Minor: 30 sec hold x 3 reps  6. Wall Slides - 3 mins  7. Wall Push-ups x 15 reps  8. 3-way Scapular Retraction - black TB 15 reps each  9. Forearm wall slides x 10 reps  10. Standing shoulder IR/ER:  Green TB - 15 reps each - changed ER to Red-TB  10 x 2 - verbal and tactile cueing for better posture and technique to move scapua, not rotate trunk.      HOMA received the following manual therapy techniques: Joint mobilizations were applied to the: right shoulder for 25 minutes, including:  Glenohumeral grade II to III jt mobilization to all planes;  Posterior capsule stretch; STM-sub-scapularis release; contract-relax shoulder abduction in side lying with stm of infraspinatous to promote greater ROM.  Scapular mobilization in all planes in " sidelying; Lateral glides to bring scapula away from thorax and stretch pec minor on Right side.       HOMA participated in neuromuscular re-education activities to improve:  for  minutes, including:     HOMA received the following supervised modalities after being cleared for contradictions: Moist Heat x 15 mins to right shoulder    Home Exercises Provided and Patient Education Provided     Education provided:   - progress towards goals   - role of therapy       Written Home Exercises Provided: corner stretches, wall slides and pulleys to be performed daily.  Exercises were reviewed and   HOMA was able to demonstrate them prior to the end of the session.   Pt received a written copy of exercises to perform at home.     HOMA demonstrated good  understanding of the education provided.     Assessment       HOMA is progressing well towards her goals.Reminder to continue to work on shoulder ROM - Homa is not as consistent as she needs to be with follow through of exercises. Encouraged her to stretch daily.    See current ROM measurements below - improvement noted in active flexion and external rotation.  She continues to have pain at end range flexion, abduction and external rotation, but again, is slowly improving. Weakness noted throughout shoulder girdle - more so posterior mm groups.     Current ROM Measurement (6/14/18)    ROM:   Shoulder  5/28/2018 Right    6/14/2018 Right   Pain/Dysfunction with Movement     AROM PROM MMT AROM PROM MMT      flexion   135   146   4/5   146 148 4/5   End range pain   extension    43   55   4+/5     46   55 4+/5   No c/o    abduction   130   138   4/5    132 142 4/5   End range pain   adduction   Able to reach to left post deltoid   28   4+/5  Able to reach to left posterior deltoid 28 4+/5   End range pain   Internal rotation Hand behind back to T8   74  4+/5  Hand behind back to T8 78 4+/5  No Pain   ER at 90° abd Hand behind head   45   4-/5 Hand behind head to C7 65 4-/5   Pain  thru movement   ER at 0° abd   46   76   4/5 73 76 4-/5   End range pain            Pt prognosis is Good.     Pt will continue to benefit from skilled outpatient physical therapy to address the deficits listed in the problem list box on initial evaluation, provide pt/family education and to maximize pt's level of independence in the home and community environment. She is making progress toward her goals, but has not achieved the 2 week goals established.  Skilled Physical Therapy intervention is still appropriate - current goals remain appropriate.     Anticipated barriers to physical therapy:     Pt's spiritual, cultural and educational needs considered and pt agreeable to plan of care and goals.    10th Visit Re-assessment:  See ROM measurements above, improvement noted in these; UEFS has improved from 39% limitation to current of 30%.  Goals of <20% are still appropriate.   G-Code: Current: CJ; Goals: CH  Goals:     Short Term GOALS:  In 2 weeks, pt. will:  1. Subjective pain no more than 2/10 with daily activity - NOT MET  2. Able to demonstrate functional AROM in right shoulder thru all planes of motion - Improving, but still difficulty with range  3. Strength improved by 1/2 grade in flexion, abduction and ER - NOT MET  4. UEFS score improved to <20% limitation - NOT MET  Long Term GOALS:  In 4 weeks, pt. Will:  1. Report subjective pain 0/10 in right shoulder with daily use  2. Demonstrate 5/5 strength in Right shoulder in all planes  3. UEFS score with 0% limitation  4. be independent with HEP and SX management   Plan     Balaji will be seen 2x weeks for 12 weeks (or until Skilled services are no longer required).   right shoulder manual therapy, there exercise and modalities as needed for pain and.or inflammation.    Sofía Martines, PT

## 2018-06-25 ENCOUNTER — CLINICAL SUPPORT (OUTPATIENT)
Dept: REHABILITATION | Facility: HOSPITAL | Age: 68
End: 2018-06-25
Payer: MEDICARE

## 2018-06-25 DIAGNOSIS — G89.29 CHRONIC RIGHT SHOULDER PAIN: Primary | ICD-10-CM

## 2018-06-25 DIAGNOSIS — M25.611 DECREASED ROM OF RIGHT SHOULDER: ICD-10-CM

## 2018-06-25 DIAGNOSIS — M25.511 CHRONIC RIGHT SHOULDER PAIN: Primary | ICD-10-CM

## 2018-06-25 DIAGNOSIS — M62.81 MUSCLE WEAKNESS OF RIGHT UPPER EXTREMITY: ICD-10-CM

## 2018-06-25 PROCEDURE — 97110 THERAPEUTIC EXERCISES: CPT | Mod: PN

## 2018-06-25 PROCEDURE — 97140 MANUAL THERAPY 1/> REGIONS: CPT | Mod: PN

## 2018-06-25 NOTE — PROGRESS NOTES
"                            Physical Therapy Daily Treatment Note     Name: Marleni Carpio [facility]  Clinic Number: [id number]    Therapy Diagnosis:  Physician: Catrachito Woods MD    Physician Orders: PT Eval and Tx  Medical Diagnosis: s/p RTC Repair, Stiffness, scapular dyskinesia Right Shoulder   Evaluation Date: 18  Authorization period Expiration: 18  Plan of Care Certification Period: 18    Visit #: 11 / Visits authorized: 12  Time In:  9:00 AM  Time Out:  10:20 AM  Total Billable Time: 30 minutes    Precautions: Standard    Subjective     Pt reports: "my shoulder was alittle sore after last treatment; I had to use the ice quite a bit."  Homa stated that she has been swimming over the weekend to try and work her shoulder.   Response to previous treatment:"My shoulder is just alittle sore"  Functional change: none    Pain: 4-5 10  Location: shoulder  right    Objective     HOMA received therapeutic exercises to develop strength and ROM for 30 minutes includin. UBE x 10 mins  2. Serratus Punches : 2# x 15 reps  3. Sidelying Ext Rotation: 2# x 15 reps  4. Prone: extension, rows and abduction: 2# x 15 reps each  5. Corner stretches for Pec Minor: 30 sec hold x 3 reps  6. Wall Slides - 3 mins  7. Wall Push-ups x 15 reps  8. 3-way Scapular Retraction - black TB 15 reps each  9. Forearm wall slides x 10 reps  10. Standing shoulder IR/ER:  Green TB - 15 reps each - changed ER to Red-TB  10 x 2 - verbal and tactile cueing for better posture and technique to move scapua, not rotate trunk.      HOMA received the following manual therapy techniques: Joint mobilizations were applied to the: right shoulder for 25 minutes, including:  Glenohumeral grade II to III jt mobilization to all planes;  Posterior capsule stretch; STM-sub-scapularis release; contract-relax shoulder abduction in side lying with stm of infraspinatous to promote greater ROM.  Scapular mobilization in all planes in sidelying; Lateral " glides to bring scapula away from thorax and stretch pec minor on Right side.       HOMA participated in neuromuscular re-education activities to improve:  for  minutes, including:     HOMA received the following supervised modalities after being cleared for contradictions: Moist Heat x 15 mins to right shoulder; 12 mins ice to right shoulder after treatment.     Home Exercises Provided and Patient Education Provided     Education provided:   - progress towards goals   - role of therapy       Written Home Exercises Provided: corner stretches, wall slides and pulleys to be performed daily.  Exercises were reviewed and   HOMA was able to demonstrate them prior to the end of the session.   Pt received a written copy of exercises to perform at home.     HOMA demonstrated good  understanding of the education provided.     Assessment       HOMA is progressing well towards her goals.Reminder to continue to work on shoulder ROM - Homa is not as consistent as she needs to be with follow through of exercises. Encouraged her to stretch daily.    See current ROM measurements below - improvement noted in active flexion and external rotation.  She continues to have pain at end range flexion, abduction and external rotation, but again, is slowly improving. Weakness noted throughout shoulder girdle - more so posterior mm groups.     Current ROM Measurement (6/14/18)    ROM:   Shoulder  5/28/2018 Right    6/14/2018 Right   Pain/Dysfunction with Movement     AROM PROM MMT AROM PROM MMT      flexion   135   146   4/5   146 148 4/5   End range pain   extension    43   55   4+/5     46   55 4+/5   No c/o    abduction   130   138   4/5    132 142 4/5   End range pain   adduction   Able to reach to left post deltoid   28   4+/5  Able to reach to left posterior deltoid 28 4+/5   End range pain   Internal rotation Hand behind back to T8   74  4+/5  Hand behind back to T8 78 4+/5  No Pain   ER at 90° abd Hand behind head   45   4-/5 Hand  behind head to C7 65 4-/5   Pain thru movement   ER at 0° abd   46   76   4/5 73 76 4-/5   End range pain            Pt prognosis is Good.     Pt will continue to benefit from skilled outpatient physical therapy to address the deficits listed in the problem list box on initial evaluation, provide pt/family education and to maximize pt's level of independence in the home and community environment. She is making progress toward her goals, but has not achieved the 2 week goals established.  Skilled Physical Therapy intervention is still appropriate - current goals remain appropriate.     Anticipated barriers to physical therapy:     Pt's spiritual, cultural and educational needs considered and pt agreeable to plan of care and goals.    10th Visit Re-assessment:  See ROM measurements above, improvement noted in these; UEFS has improved from 39% limitation to current of 30%.  Goals of <20% are still appropriate.   G-Code: Current: CJ; Goals: CH  Goals:     Short Term GOALS:  In 2 weeks, pt. will:  1. Subjective pain no more than 2/10 with daily activity - NOT MET  2. Able to demonstrate functional AROM in right shoulder thru all planes of motion - Improving, but still difficulty with range  3. Strength improved by 1/2 grade in flexion, abduction and ER - NOT MET  4. UEFS score improved to <20% limitation - NOT MET  Long Term GOALS:  In 4 weeks, pt. Will:  1. Report subjective pain 0/10 in right shoulder with daily use  2. Demonstrate 5/5 strength in Right shoulder in all planes  3. UEFS score with 0% limitation  4. be independent with HEP and SX management   Plan     Balaji will be seen 2x weeks for 12 weeks (or until Skilled services are no longer required).   right shoulder manual therapy, there exercise and modalities as needed for pain and.or inflammation.  Encouraged Balaji to really focus on her ROM and scapular exercises.     Sofía Martines, PT

## 2018-06-28 ENCOUNTER — CLINICAL SUPPORT (OUTPATIENT)
Dept: REHABILITATION | Facility: HOSPITAL | Age: 68
End: 2018-06-28
Payer: MEDICARE

## 2018-06-28 DIAGNOSIS — M25.511 CHRONIC RIGHT SHOULDER PAIN: Primary | ICD-10-CM

## 2018-06-28 DIAGNOSIS — G89.29 CHRONIC RIGHT SHOULDER PAIN: Primary | ICD-10-CM

## 2018-06-28 DIAGNOSIS — M25.611 DECREASED ROM OF RIGHT SHOULDER: ICD-10-CM

## 2018-06-28 DIAGNOSIS — M62.81 MUSCLE WEAKNESS OF RIGHT UPPER EXTREMITY: ICD-10-CM

## 2018-06-28 PROCEDURE — 97140 MANUAL THERAPY 1/> REGIONS: CPT | Mod: PN

## 2018-06-28 PROCEDURE — 97110 THERAPEUTIC EXERCISES: CPT | Mod: PN

## 2018-06-28 NOTE — PROGRESS NOTES
"                            Physical Therapy Daily Treatment Note     Name: Marleni Carpio [facility] Number: [id number]    Therapy Diagnosis:  Physician: Catrachito Woods MD    Physician Orders: PT Eval and Tx  Medical Diagnosis: s/p RTC Repair, Stiffness, scapular dyskinesia Right Shoulder   Evaluation Date: 18  Authorization period Expiration: 18  Plan of Care Certification Period: 2018    Visit #: 12 / Visits authorized: 12  New Script for 2x/week x 12 weeks (up to 24 visits if needed)  Time In:  9:00 AM  Time Out:  10:20 AM  Total Billable Time: 30 minutes    Precautions: Standard    Subjective     Pt reports: "my shoulder was alittle sore after last treatment; I had to use the ice quite a bit."  Homa stated that she has been swimming over the weekend to try and work her shoulder.   Response to previous treatment:"My shoulder is just alittle sore"  Functional change: none    Pain: 4-5 /10  Location: shoulder  right    Objective     HOMA received therapeutic exercises to develop strength and ROM for 30 minutes includin. UBE x 10 mins  2. Serratus Punches : 2# x 15 reps  3. Sidelying Ext Rotation: 2# x 15 reps  4. Prone: extension, rows and abduction: 2# x 15 reps each  5. Corner stretches for Pec Minor: 30 sec hold x 3 reps  6. Wall Slides - 3 mins  7. Wall Push-ups x 15 reps  8. 3-way Scapular Retraction - black TB 15 reps each  9. Forearm wall slides x 10 reps  10. Standing shoulder IR/ER:  Green TB - 15 reps each - changed ER to Red-TB  10 x 2 - verbal and tactile cueing for better posture and technique to move scapua, not rotate trunk.      HOMA received the following manual therapy techniques: Joint mobilizations were applied to the: right shoulder for 25 minutes, including:  Glenohumeral grade II to III jt mobilization to all planes;  Posterior capsule stretch; STM-sub-scapularis release; contract-relax shoulder abduction in side lying with stm of infraspinatous to promote greater ROM. "  Scapular mobilization in all planes in sidelying; Lateral glides to bring scapula away from thorax and stretch pec minor on Right side.       HOMA participated in neuromuscular re-education activities to improve:  for  minutes, including:     OHMA received the following supervised modalities after being cleared for contradictions: Moist Heat x 15 mins to right shoulder; 12 mins ice to right shoulder after treatment.     Home Exercises Provided and Patient Education Provided     Education provided:   - progress towards goals   - role of therapy       Written Home Exercises Provided: corner stretches, wall slides and pulleys to be performed daily.  Exercises were reviewed and   HOMA was able to demonstrate them prior to the end of the session.   Pt received a written copy of exercises to perform at home.     HOMA demonstrated good  understanding of the education provided.     Assessment       HOMA is progressing well towards her goals.Reminder to continue to work on shoulder ROM - Homa is not as consistent as she needs to be with follow through of exercises. Encouraged her to stretch daily.    See current ROM measurements below - improvement noted in active flexion and external rotation.  She continues to have pain at end range flexion, abduction and external rotation, but again, is slowly improving. Weakness noted throughout shoulder girdle - more so posterior mm groups.     Current ROM Measurement (6/14/18)    ROM:   Shoulder  5/28/2018 Right    6/14/2018 Right   Pain/Dysfunction with Movement     AROM PROM MMT AROM PROM MMT      flexion   135   146   4/5   146 148 4/5   End range pain   extension    43   55   4+/5     46   55 4+/5   No c/o    abduction   130   138   4/5    132 142 4/5   End range pain   adduction   Able to reach to left post deltoid   28   4+/5  Able to reach to left posterior deltoid 28 4+/5   End range pain   Internal rotation Hand behind back to T8   74  4+/5  Hand behind back to T8 78 4+/5  No  Pain   ER at 90° abd Hand behind head   45   4-/5 Hand behind head to C7 65 4-/5   Pain thru movement   ER at 0° abd   46   76   4/5 73 76 4-/5   End range pain            Pt prognosis is Good.     Pt will continue to benefit from skilled outpatient physical therapy to address the deficits listed in the problem list box on initial evaluation, provide pt/family education and to maximize pt's level of independence in the home and community environment. She is making progress toward her goals, but has not achieved the 2 week goals established.  Skilled Physical Therapy intervention is still appropriate - current goals remain appropriate.     Anticipated barriers to physical therapy:     Pt's spiritual, cultural and educational needs considered and pt agreeable to plan of care and goals.    10th Visit Re-assessment:  See ROM measurements above, improvement noted in these; UEFS has improved from 39% limitation to current of 30%.  Goals of <20% are still appropriate.   G-Code: Current: CJ; Goals: CH  Goals:     Short Term GOALS:  In 2 weeks, pt. will:  1. Subjective pain no more than 2/10 with daily activity - NOT MET  2. Able to demonstrate functional AROM in right shoulder thru all planes of motion - Improving, but still difficulty with range  3. Strength improved by 1/2 grade in flexion, abduction and ER - NOT MET  4. UEFS score improved to <20% limitation - NOT MET  Long Term GOALS:  In 4 weeks, pt. Will:  1. Report subjective pain 0/10 in right shoulder with daily use  2. Demonstrate 5/5 strength in Right shoulder in all planes  3. UEFS score with 0% limitation  4. be independent with HEP and SX management   Plan     Balaji will be seen 2x weeks for 12 weeks (or until Skilled services are no longer required).   right shoulder manual therapy, there exercise and modalities as needed for pain and.or inflammation.  Encouraged Balaji to really focus on her ROM and scapular exercises.     Sofía Martines, PT

## 2018-07-05 ENCOUNTER — CLINICAL SUPPORT (OUTPATIENT)
Dept: REHABILITATION | Facility: HOSPITAL | Age: 68
End: 2018-07-05
Payer: MEDICARE

## 2018-07-05 DIAGNOSIS — M25.611 DECREASED ROM OF RIGHT SHOULDER: ICD-10-CM

## 2018-07-05 DIAGNOSIS — G89.29 CHRONIC RIGHT SHOULDER PAIN: Primary | ICD-10-CM

## 2018-07-05 DIAGNOSIS — M25.511 CHRONIC RIGHT SHOULDER PAIN: Primary | ICD-10-CM

## 2018-07-05 DIAGNOSIS — M62.81 MUSCLE WEAKNESS OF RIGHT UPPER EXTREMITY: ICD-10-CM

## 2018-07-05 PROCEDURE — 97140 MANUAL THERAPY 1/> REGIONS: CPT | Mod: PN

## 2018-07-05 NOTE — PROGRESS NOTES
"                            Physical Therapy Daily Treatment Note     Name: Marleni Carpio [facility]  Clinic Number: [id number]    Therapy Diagnosis:  Physician: Catrachito Woods MD    Physician Orders: PT Eval and Tx  Medical Diagnosis: s/p RTC Repair, Stiffness, scapular dyskinesia Right Shoulder   Evaluation Date: 5/28/18  Authorization period Expiration: 12/31/18  Plan of Care Certification Period: 8/30/2018    Visit #: 13 / Visits authorized: 36  New Script for 2x/week x 12 weeks (up to 24 visits if needed)  Time In:  9:00 AM  Time Out:  10:00 AM  Total Billable Time: 30 minutes    Precautions: Standard    Subjective     Pt reports: Homa reports that she wants to cut treatment short today; has been fighting a bad kidney infection over the weekend, high fever, went to urgent care and was put on antibiotics - not feeling really well today.   Response to previous treatment:"My shoulder is just alittle sore"  Functional change: none    Pain: 4-5 /10  Location: shoulder  right    Objective     HOMA received therapeutic exercises to develop strength and ROM for 30 minutes including: Only did pulleys for about 5 mins to stretch today - focus on manual tx only  1. UBE x 10 mins  2. Serratus Punches : 2# x 15 reps  3. Sidelying Ext Rotation: 2# x 15 reps  4. Prone: extension, rows and abduction: 2# x 15 reps each  5. Corner stretches for Pec Minor: 30 sec hold x 3 reps  6. Wall Slides - 3 mins  7. Wall Push-ups x 15 reps  8. 3-way Scapular Retraction - black TB 15 reps each  9. Forearm wall slides x 10 reps  10. Standing shoulder IR/ER:  Green TB - 15 reps each - changed ER to Red-TB  10 x 2 - verbal and tactile cueing for better posture and technique to move scapua, not rotate trunk.      HOMA received the following manual therapy techniques: Joint mobilizations were applied to the: right shoulder for 25 minutes, including:  Glenohumeral grade II to III jt mobilization to all planes;  Posterior capsule stretch; " STM-sub-scapularis release; contract-relax shoulder abduction in side lying with stm of infraspinatous to promote greater ROM.  Scapular mobilization in all planes in sidelying; Lateral glides to bring scapula away from thorax and stretch pec minor on Right side.       HOMA participated in neuromuscular re-education activities to improve:  for  minutes, including:     HOMA received the following supervised modalities after being cleared for contradictions: Moist Heat x 15 mins to right shoulder; 12 mins ice to right shoulder after treatment.     Home Exercises Provided and Patient Education Provided     Education provided:   - progress towards goals   - role of therapy       Written Home Exercises Provided: corner stretches, wall slides and pulleys to be performed daily.  Exercises were reviewed and   HOMA was able to demonstrate them prior to the end of the session.   Pt received a written copy of exercises to perform at home.     HOMA demonstrated good  understanding of the education provided.     Assessment       HOMA is progressing well towards her goals.Reminder to continue to work on shoulder ROM - Homa is not as consistent as she needs to be with follow through of exercises. Encouraged her to stretch daily.    See current ROM measurements below - improvement noted in active flexion and external rotation.  She continues to have pain at end range flexion, abduction and external rotation, but again, is slowly improving. Weakness noted throughout shoulder girdle - more so posterior mm groups.     Current ROM Measurement (7/5/18)    ROM:   Shoulder  7/5/2018 Right    6/14/2018 Right   Pain/Dysfunction with Movement     AROM PROM MMT AROM PROM MMT      flexion   148   148   4/5   146 148 4/5   End range pain   extension    43   55   4+/5     46   55 4+/5   No c/o    abduction 140   141   4/5    132 142 4/5   End range pain   adduction   Able to reach to left post deltoid   28   4+/5  Able to reach to left posterior  deltoid 28 4+/5   End range pain   Internal rotation Hand behind back to T8   85  4+/5  Hand behind back to T8 78 4+/5  No Pain   ER at 90° abd Hand behind head   65   4-/5 Hand behind head to C7 65 4-/5   Pain thru movement   ER at 0° abd   46   76   4/5 73 76 4-/5   End range pain            Pt prognosis is Good.     Pt will continue to benefit from skilled outpatient physical therapy to address the deficits listed in the problem list box on initial evaluation, provide pt/family education and to maximize pt's level of independence in the home and community environment. She is making progress toward her goals, but has not achieved the 2 week goals established.  Skilled Physical Therapy intervention is still appropriate - current goals remain appropriate.     Anticipated barriers to physical therapy:     Pt's spiritual, cultural and educational needs considered and pt agreeable to plan of care and goals.    10th Visit Re-assessment:  See ROM measurements above, improvement noted in these; UEFS has improved from 39% limitation to current of 30%.  Goals of <20% are still appropriate.   G-Code: Current: ; Goals: CH  Goals:     Short Term GOALS:  In 2 weeks, pt. will:  1. Subjective pain no more than 2/10 with daily activity - NOT MET  2. Able to demonstrate functional AROM in right shoulder thru all planes of motion - Improving, but still difficulty with range  3. Strength improved by 1/2 grade in flexion, abduction and ER - NOT MET  4. UEFS score improved to <20% limitation - NOT MET  Long Term GOALS:  In 4 weeks, pt. Will:  1. Report subjective pain 0/10 in right shoulder with daily use  2. Demonstrate 5/5 strength in Right shoulder in all planes  3. UEFS score with 0% limitation  4. be independent with HEP and SX management   Plan     Balaji will be seen 2x weeks for 12 weeks (or until Skilled services are no longer required).   right shoulder manual therapy, there exercise and modalities as needed for pain and.or  inflammation.  Encouraged Balaji to really focus on her ROM and scapular exercises.     Sofía Martines, PT

## 2018-07-24 ENCOUNTER — CLINICAL SUPPORT (OUTPATIENT)
Dept: REHABILITATION | Facility: HOSPITAL | Age: 68
End: 2018-07-24
Payer: MEDICARE

## 2018-07-24 DIAGNOSIS — M62.81 MUSCLE WEAKNESS OF RIGHT UPPER EXTREMITY: Primary | ICD-10-CM

## 2018-07-24 DIAGNOSIS — M25.611 DECREASED ROM OF RIGHT SHOULDER: ICD-10-CM

## 2018-07-24 PROCEDURE — 97010 HOT OR COLD PACKS THERAPY: CPT | Mod: PN

## 2018-07-24 PROCEDURE — 97110 THERAPEUTIC EXERCISES: CPT | Mod: PN

## 2018-07-24 PROCEDURE — 97140 MANUAL THERAPY 1/> REGIONS: CPT | Mod: PN

## 2018-07-24 NOTE — PROGRESS NOTES
"                            Physical Therapy Daily Treatment Note     Name: Marleni Carpio [facility] Number: [id number]    Therapy Diagnosis:  Physician: Catrachito Woods MD    Physician Orders: PT Eval and Tx  Medical Diagnosis: s/p RTC Repair, Stiffness, scapular dyskinesia Right Shoulder   Evaluation Date: 18  Authorization period Expiration: 18  Plan of Care Certification Period: 2018    Visit #: 14 / Visits authorized: 36  New Script for 2x/week x 12 weeks (up to 24 visits if needed)  Time In:  11:00 AM  Time Out:  12:25 PM  Total Billable Time: 45 minutes    Precautions: Standard    Subjective     Pt reports: Homa reports that her right shoulder has been hurting.  Response to previous treatment:"My shoulder is just alittle sore"  Functional change: none    Pain: 7 /10  Location: shoulder  right    Objective     HOMA received therapeutic exercises to develop strength and ROM for 30 minutes includin. UBE x 12 mins  2. Serratus Punches : 2# x 15 reps  3. Sidelying Ext Rotation: 2# x 15 reps  4. Prone: extension, rows and abduction: 2# x 15 reps each  5. Corner stretches for Pec Minor: 30 sec hold x 3 reps  6. Wall Slides - 3 mins  7. Wall Push-ups x 15 reps  8. 3-way Scapular Retraction - black TB 15 reps each  9. Forearm wall slides x 10 reps  10. Standing shoulder IR/ER:  Green TB - 15 reps each - changed ER to Red-TB  10 x 2 - verbal and tactile cueing for better posture and technique to move scapua, not rotate trunk.  11. Pulleys x 5 mins    HOMA received the following manual therapy techniques: Joint mobilizations were applied to the: right shoulder for 20 minutes, including:  Glenohumeral grade II to III jt mobilization to all planes; STM-sub-scapularis release;  Scapular mobilization in all planes in sidelying; Lateral glides to bring scapula away from thorax and stretch pec minor on Right side.       HOMA participated in neuromuscular re-education activities to improve:  for  " minutes, including:     HOMA received the following supervised modalities after being cleared for contradictions: Moist Heat x 15 mins to right shoulder; 12 mins ice to right shoulder after treatment.     Home Exercises Provided and Patient Education Provided     Education provided:   - progress towards goals   - role of therapy       Written Home Exercises Provided: corner stretches, wall slides and pulleys to be performed daily.  Exercises were reviewed and   HOMA was able to demonstrate them prior to the end of the session.   Pt received a written copy of exercises to perform at home.     HOMA demonstrated good  understanding of the education provided.     Assessment       HOMA is progressing well towards her goals.Reminder to continue to work on shoulder ROM - Homa is not as consistent as she needs to be with follow through of exercises. Encouraged her to stretch daily. No follow up with exercises at home.   See current ROM measurements below - improvement noted in active flexion and external rotation.  She continues to have pain at end range flexion, abduction and external rotation, but again, is slowly improving. Weakness noted throughout shoulder girdle - more so posterior mm groups.     Current ROM Measurement (7/5/18)    ROM:   Shoulder  7/5/2018 Right    6/14/2018 Right   Pain/Dysfunction with Movement     AROM PROM MMT AROM PROM MMT      flexion   148   148   4/5   146 148 4/5   End range pain   extension    43   55   4+/5     46   55 4+/5   No c/o    abduction 140   141   4/5    132 142 4/5   End range pain   adduction   Able to reach to left post deltoid   28   4+/5  Able to reach to left posterior deltoid 28 4+/5   End range pain   Internal rotation Hand behind back to T8   85  4+/5  Hand behind back to T8 78 4+/5  No Pain   ER at 90° abd Hand behind head   65   4-/5 Hand behind head to C7 65 4-/5   Pain thru movement   ER at 0° abd   46   76   4/5 73 76 4-/5   End range pain            Pt prognosis is  Good.     Pt will continue to benefit from skilled outpatient physical therapy to address the deficits listed in the problem list box on initial evaluation, provide pt/family education and to maximize pt's level of independence in the home and community environment. She is making progress toward her goals, but has not achieved the 2 week goals established.  Skilled Physical Therapy intervention is still appropriate - current goals remain appropriate.     Anticipated barriers to physical therapy:     Pt's spiritual, cultural and educational needs considered and pt agreeable to plan of care and goals.    10th Visit Re-assessment:  See ROM measurements above, improvement noted in these; UEFS has improved from 39% limitation to current of 30%.  Goals of <20% are still appropriate.   G-Code: Current: CJ; Goals: CH  Goals:     Short Term GOALS:  In 2 weeks, pt. will:  1. Subjective pain no more than 2/10 with daily activity - NOT MET  2. Able to demonstrate functional AROM in right shoulder thru all planes of motion - Improving, but still difficulty with range  3. Strength improved by 1/2 grade in flexion, abduction and ER - NOT MET  4. UEFS score improved to <20% limitation - NOT MET  Long Term GOALS:  In 4 weeks, pt. Will:  1. Report subjective pain 0/10 in right shoulder with daily use  2. Demonstrate 5/5 strength in Right shoulder in all planes  3. UEFS score with 0% limitation  4. be independent with HEP and SX management   Plan     Balaji will be seen 2x weeks for 12 weeks (or until Skilled services are no longer required).   right shoulder manual therapy, there exercise and modalities as needed for pain and.or inflammation.  Encouraged Balaji to really focus on her ROM and scapular exercises.     Jonathan Favre, PTA

## 2018-07-26 ENCOUNTER — CLINICAL SUPPORT (OUTPATIENT)
Dept: REHABILITATION | Facility: HOSPITAL | Age: 68
End: 2018-07-26
Payer: MEDICARE

## 2018-07-26 DIAGNOSIS — M62.81 MUSCLE WEAKNESS OF RIGHT UPPER EXTREMITY: ICD-10-CM

## 2018-07-26 DIAGNOSIS — G89.29 CHRONIC RIGHT SHOULDER PAIN: ICD-10-CM

## 2018-07-26 DIAGNOSIS — M25.611 DECREASED ROM OF RIGHT SHOULDER: Primary | ICD-10-CM

## 2018-07-26 DIAGNOSIS — M25.511 CHRONIC RIGHT SHOULDER PAIN: ICD-10-CM

## 2018-07-26 PROCEDURE — 97140 MANUAL THERAPY 1/> REGIONS: CPT | Mod: PN

## 2018-07-26 PROCEDURE — 97110 THERAPEUTIC EXERCISES: CPT | Mod: PN

## 2018-07-26 NOTE — PROGRESS NOTES
"                            Physical Therapy Daily Treatment Note     Name: Marleni Carpio [facility] Number: [id number]    Therapy Diagnosis:  Physician: Catrachito Woods MD    Physician Orders: PT Eval and Tx  Medical Diagnosis: s/p RTC Repair, Stiffness, scapular dyskinesia Right Shoulder   Evaluation Date: 5/28/18  Authorization period Expiration: 12/31/18  Plan of Care Certification Period: 8/30/2018    Visit #: 15 / Visits authorized: 36  New Script for 2x/week x 12 weeks (up to 24 visits if needed)  Time In:  9:00 AM  Time Out:  10:10 AM  Total Billable Time: 45 minutes    Precautions: Standard    Subjective     Pt reports: Homa is reporting muscle soreness in her anterior and middle deltoid today - extends into biceps as well.  Hasn't had much time to work on her shoulder due to everything else going on in her life at the present time.   Response to previous treatment:"My shoulder is just alittle sore"  Functional change: none    Pain:   6 /10  Location: shoulder  right    Objective     HOMA received therapeutic exercises to develop strength and ROM for 30 minutes including:   Exercises in bold performed today - time constraints per patient. Focused more on manual tx today.    1. UBE x 12 mins  2. Serratus Punches : 2# x 15 reps  3. Sidelying Ext Rotation: 2# x 15 reps  4. Prone: extension, rows and abduction: 2# x 15 reps each  5. Corner stretches for Pec Minor: 30 sec hold x 3 reps  6. Wall Slides - 3 mins  7. Wall Push-ups x 15 reps  8. 3-way Scapular Retraction - black TB 15 reps each  9. Forearm wall slides x 10 reps  10. Standing shoulder IR/ER:  Green TB - 15 reps each - changed ER to Red-TB  10 x 2 - verbal and tactile cueing for better posture and technique to move scapua, not rotate trunk.  11. Pulleys x 5 mins    HOMA received the following manual therapy techniques: Joint mobilizations were applied to the: right shoulder for 25 minutes - spent extra time with manual treatment today as she was " unable to come last week at all. , including:  Glenohumeral grade II to III jt mobilization to all planes; STM-sub-scapularis release;  Scapular mobilization in all planes in sidelying; Lateral glides to bring scapula away from thorax and stretch pec minor on Right side. IASTM to entire deltoid, biceps/triceps mm as well as subscapularis.       HOMA participated in neuromuscular re-education activities to improve:  for  minutes, including:     HOMA received the following supervised modalities after being cleared for contradictions: Moist Heat x 15 mins to right shoulder      Home Exercises Provided and Patient Education Provided     Education provided:   - progress towards goals   - role of therapy       Written Home Exercises Provided: corner stretches, wall slides and pulleys to be performed daily.  Exercises were reviewed and   HOMA was able to demonstrate them prior to the end of the session.   Pt received a written copy of exercises to perform at home.     HOMA demonstrated good  understanding of the education provided.     Assessment       HOMA is progressing well towards her goals.Reminder to continue to work on shoulder ROM - Homa is not as consistent as she needs to be with follow through of exercises. Encouraged her to stretch daily. No follow up with exercises at home.   See current ROM measurements below - improvement noted in active flexion and external rotation.  She continues to have pain at end range flexion, abduction and external rotation, but again, is slowly improving. Weakness noted throughout shoulder girdle - more so posterior mm groups.     Current ROM Measurement (7/5/18)    ROM:   Shoulder  7/5/2018 Right    6/14/2018 Right   Pain/Dysfunction with Movement     AROM PROM MMT AROM PROM MMT      flexion   148   148   4/5   146 148 4/5   End range pain   extension    43   55   4+/5     46   55 4+/5   No c/o    abduction 140   141   4/5    132 142 4/5   End range pain   adduction   Able to reach  to left post deltoid   28   4+/5  Able to reach to left posterior deltoid 28 4+/5   End range pain   Internal rotation Hand behind back to T8   85  4+/5  Hand behind back to T8 78 4+/5  No Pain   ER at 90° abd Hand behind head   65   4-/5 Hand behind head to C7 65 4-/5   Pain thru movement   ER at 0° abd   46   76   4/5 73 76 4-/5   End range pain            Pt prognosis is Good.     Pt will continue to benefit from skilled outpatient physical therapy to address the deficits listed in the problem list box on initial evaluation, provide pt/family education and to maximize pt's level of independence in the home and community environment. She is making progress toward her goals, but has not achieved the 2 week goals established.  Skilled Physical Therapy intervention is still appropriate - current goals remain appropriate.     Anticipated barriers to physical therapy:     Pt's spiritual, cultural and educational needs considered and pt agreeable to plan of care and goals.    10th Visit Re-assessment:  See ROM measurements above, improvement noted in these; UEFS has improved from 39% limitation to current of 30%.  Goals of <20% are still appropriate.   G-Code: Current: CJ; Goals: CH  Goals:     Short Term GOALS:  In 4 weeks, pt. will:  1. Subjective pain no more than 2/10 with daily activity - NOT MET  2. Able to demonstrate functional AROM in right shoulder thru all planes of motion - Improving, but still difficulty with range  3. Strength improved by 1/2 grade in flexion, abduction and ER - NOT MET  4. UEFS score improved to <20% limitation - NOT MET  Long Term GOALS:  In 8 weeks, pt. Will:  1. Report subjective pain 0/10 in right shoulder with daily use  2. Demonstrate 5/5 strength in Right shoulder in all planes  3. UEFS score with 0% limitation  4. be independent with HEP and SX management   Plan     Balaji will be seen 2x weeks for 12 weeks (or until Skilled services are no longer required).   right shoulder manual  therapy, there exercise and modalities as needed for pain and.or inflammation.  Encouraged Balaji to really focus on her ROM and scapular exercises.     Sofía Martines, PT

## 2018-07-30 ENCOUNTER — CLINICAL SUPPORT (OUTPATIENT)
Dept: REHABILITATION | Facility: HOSPITAL | Age: 68
End: 2018-07-30
Payer: MEDICARE

## 2018-07-30 DIAGNOSIS — M62.81 MUSCLE WEAKNESS OF RIGHT UPPER EXTREMITY: ICD-10-CM

## 2018-07-30 DIAGNOSIS — G89.29 CHRONIC RIGHT SHOULDER PAIN: ICD-10-CM

## 2018-07-30 DIAGNOSIS — M25.511 CHRONIC RIGHT SHOULDER PAIN: ICD-10-CM

## 2018-07-30 DIAGNOSIS — M25.611 DECREASED ROM OF RIGHT SHOULDER: Primary | ICD-10-CM

## 2018-07-30 PROCEDURE — 97140 MANUAL THERAPY 1/> REGIONS: CPT | Mod: PN

## 2018-07-30 PROCEDURE — 97110 THERAPEUTIC EXERCISES: CPT | Mod: PN

## 2018-07-30 NOTE — PROGRESS NOTES
"                            Physical Therapy Daily Treatment Note     Name: Marleni Carpio [facility]  Clinic Number: [id number]    Therapy Diagnosis:  Physician: Catrachito Woods MD    Physician Orders: PT Eval and Tx  Medical Diagnosis: s/p RTC Repair, Stiffness, scapular dyskinesia Right Shoulder   Evaluation Date: 18  Authorization period Expiration: 18  Plan of Care Certification Period: 2018    Visit #: 16 / Visits authorized: 36    Time In:  8:10 AM  Time Out:  9:20 AM  Total Billable Time: 30 minutes    Precautions: Standard    Subjective     Pt reports: Homa is reporting general pain around her right shoulder - stiffness continues; limited exercise or stretching at home. She has been dealing with car issues as well as her Bipolar son- just hasn't had time or inclination to do much.   Response to previous treatment:"My shoulder is just alittle sore"  Functional change: none    Pain:   6 /10  Location: shoulder  right    Objective     HOMA received therapeutic exercises to develop strength and ROM for 30 minutes includin. UBE x 12 mins  2. Serratus Punches : 2# x 15 reps  3. Sidelying Ext Rotation: 2# x 15 reps  4. Prone: extension, rows and abduction: 2# x 15 reps each  5. Corner stretches for Pec Minor: 30 sec hold x 3 reps  6. Wall Slides - 3 mins  7. Wall Push-ups x 15 reps  8. 3-way Scapular Retraction - black TB 15 reps each  9. Forearm wall slides x 10 reps  10. Standing shoulder IR/ER:  Green TB - 15 reps each - changed ER to Red-TB  10 x 2 - verbal and tactile cueing for better posture and technique to move scapua, not rotate trunk. - better technique noted.   11. Pulleys x 5 mins    HOMA received the following manual therapy techniques: Joint mobilizations were applied to the: right shoulder for 25 minutes - spent extra time with manual treatment today as she was unable to come last week at all. , including:  Glenohumeral grade II to III jt mobilization to all planes; " STM-sub-scapularis release;  Scapular mobilization in all planes in sidelying; Lateral glides to bring scapula away from thorax and stretch pec minor on Right side. IASTM to entire deltoid, biceps/triceps mm as well as subscapularis.       HOMA participated in neuromuscular re-education activities to improve:  for  minutes, including:     HOMA received the following supervised modalities after being cleared for contradictions: Moist Heat x 15 mins to right shoulder      Home Exercises Provided and Patient Education Provided     Education provided:   - progress towards goals   - role of therapy       Written Home Exercises Provided: corner stretches, wall slides and pulleys to be performed daily.  Exercises were reviewed and   HOMA was able to demonstrate them prior to the end of the session.   Pt received a written copy of exercises to perform at home.     HOMA demonstrated good  understanding of the education provided.     Assessment       HOMA is progressing well towards her goals.Reminder to continue to work on shoulder ROM - Homa is not as consistent as she needs to be with follow through of exercises. Encouraged her to stretch daily. No follow up with exercises at home.   See current ROM measurements below - improvement noted in active flexion and external rotation.  She continues to have pain at end range flexion, abduction and external rotation, but again, is slowly improving. Weakness noted throughout shoulder girdle - more so posterior mm groups.     Current ROM Measurement (7/5/18)    ROM:   Shoulder  7/5/2018 Right    6/14/2018 Right   Pain/Dysfunction with Movement     AROM PROM MMT AROM PROM MMT      flexion   148   148   4/5   146 148 4/5   End range pain   extension    43   55   4+/5     46   55 4+/5   No c/o    abduction 140   141   4/5    132 142 4/5   End range pain   adduction   Able to reach to left post deltoid   28   4+/5  Able to reach to left posterior deltoid 28 4+/5   End range pain    Internal rotation Hand behind back to T8   85  4+/5  Hand behind back to T8 78 4+/5  No Pain   ER at 90° abd Hand behind head   65   4-/5 Hand behind head to C7 65 4-/5   Pain thru movement   ER at 0° abd   46   76   4/5 73 76 4-/5   End range pain            Pt prognosis is Good.     Pt will continue to benefit from skilled outpatient physical therapy to address the deficits listed in the problem list box on initial evaluation, provide pt/family education and to maximize pt's level of independence in the home and community environment. She is making progress toward her goals, but has not achieved the 2 week goals established.  Skilled Physical Therapy intervention is still appropriate - current goals remain appropriate.     Anticipated barriers to physical therapy:     Pt's spiritual, cultural and educational needs considered and pt agreeable to plan of care and goals.    10th Visit Re-assessment:  See ROM measurements above, improvement noted in these; UEFS has improved from 39% limitation to current of 30%.  Goals of <20% are still appropriate.   G-Code: Current: ; Goals: CH  Goals:     Short Term GOALS:  In 4 weeks, pt. will:  1. Subjective pain no more than 2/10 with daily activity - NOT MET  2. Able to demonstrate functional AROM in right shoulder thru all planes of motion - Improving, but still difficulty with range  3. Strength improved by 1/2 grade in flexion, abduction and ER - NOT MET  4. UEFS score improved to <20% limitation - NOT MET  Long Term GOALS:  In 8 weeks, pt. Will:  1. Report subjective pain 0/10 in right shoulder with daily use  2. Demonstrate 5/5 strength in Right shoulder in all planes  3. UEFS score with 0% limitation  4. be independent with HEP and SX management   Plan     Balaji will be seen 2x weeks for 12 weeks (or until Skilled services are no longer required).   right shoulder manual therapy, there exercise and modalities as needed for pain and.or inflammation.  Encouraged Balaji to  really focus on her ROM and scapular exercises.     Sofía Martines, PT

## 2018-08-02 ENCOUNTER — CLINICAL SUPPORT (OUTPATIENT)
Dept: REHABILITATION | Facility: HOSPITAL | Age: 68
End: 2018-08-02
Payer: MEDICARE

## 2018-08-02 DIAGNOSIS — M25.511 CHRONIC RIGHT SHOULDER PAIN: ICD-10-CM

## 2018-08-02 DIAGNOSIS — M62.81 MUSCLE WEAKNESS OF RIGHT UPPER EXTREMITY: ICD-10-CM

## 2018-08-02 DIAGNOSIS — M25.611 DECREASED ROM OF RIGHT SHOULDER: Primary | ICD-10-CM

## 2018-08-02 DIAGNOSIS — G89.29 CHRONIC RIGHT SHOULDER PAIN: ICD-10-CM

## 2018-08-02 PROCEDURE — 97140 MANUAL THERAPY 1/> REGIONS: CPT | Mod: PN

## 2018-08-02 PROCEDURE — 97110 THERAPEUTIC EXERCISES: CPT | Mod: PN

## 2018-08-02 NOTE — PROGRESS NOTES
"                            Physical Therapy Daily Treatment Note     Name: Marleni Carpio [facility]  Clinic Number: [id number]    Therapy Diagnosis:  Physician: Catrachito Woods MD    Physician Orders: PT Eval and Tx  Medical Diagnosis: s/p RTC Repair, Stiffness, scapular dyskinesia Right Shoulder   Evaluation Date: 18  Authorization period Expiration: 18  Plan of Care Certification Period: 2018    Visit #: 16 / Visits authorized: 36    Time In:  8:10 AM  Time Out:  9:20 AM  Total Billable Time: 30 minutes    Precautions: Standard    Subjective     Pt reports: Homa is reporting general pain around her right shoulder - stiffness continues; limited exercise or stretching at home. She has been dealing with car issues as well as her Bipolar son- just hasn't had time or inclination to do much.   Response to previous treatment:"My shoulder is just alittle sore"  Functional change: none    Pain:   6 /10  Location: shoulder  right    Objective     HOMA received therapeutic exercises to develop strength and ROM for 30 minutes includin. UBE x 12 mins  2. Serratus Punches : 2# x 15 reps  3. Sidelying Ext Rotation: 2# x 15 reps  4. Prone: extension, rows and abduction: 2# x 15 reps each  5. Corner stretches for Pec Minor: 30 sec hold x 3 reps  6. Wall Slides - 3 mins  7. Wall Push-ups x 15 reps  8. 3-way Scapular Retraction - black TB 15 reps each  9. Forearm wall slides x 10 reps  10. Standing shoulder IR/ER:  Green TB - 15 reps each - changed ER to Red-TB  10 x 2 - verbal and tactile cueing for better posture and technique to move scapua, not rotate trunk. - better technique noted.   11. Pulleys x 5 mins    HOMA received the following manual therapy techniques: Joint mobilizations were applied to the: right shoulder for 25 minutes - spent extra time with manual treatment today as she was unable to come last week at all. , including:  Glenohumeral grade II to III jt mobilization to all planes; " STM-sub-scapularis release;  Scapular mobilization in all planes in sidelying; Lateral glides to bring scapula away from thorax and stretch pec minor on Right side. IASTM to entire deltoid, biceps/triceps mm as well as subscapularis.       HOMA participated in neuromuscular re-education activities to improve:  for  minutes, including:     HOMA received the following supervised modalities after being cleared for contradictions: Moist Heat x 15 mins to right shoulder      Home Exercises Provided and Patient Education Provided     Education provided:   - progress towards goals   - role of therapy       Written Home Exercises Provided: corner stretches, wall slides and pulleys to be performed daily.  Exercises were reviewed and   HOMA was able to demonstrate them prior to the end of the session.   Pt received a written copy of exercises to perform at home.     HOMA demonstrated good  understanding of the education provided.     Assessment       HOMA is progressing well towards her goals.SHe is demonstrating a slow but steady improvement in her AROM; Still noting pain, but reports it is less intense with movement. Reminder to continue to work on shoulder ROM - Homa is not as consistent as she needs to be with follow through of exercises. Encouraged her to stretch daily. No follow up with exercises at home.   See current ROM measurements below - improvement noted in active flexion and external rotation.  She continues to have pain at end range flexion, abduction and external rotation, but again, is slowly improving. Weakness noted throughout shoulder girdle - more so posterior mm groups.     Current ROM Measurement (8/2/18)    ROM:   Shoulder  8/2/2018 Right    6/14/2018 Right   Pain/Dysfunction with Movement     AROM PROM MMT AROM PROM MMT      flexion   155   157   4/5   146 148 4/5   End range pain   extension    43   55   4+/5     46   55 4+/5   No c/o    abduction 148   148   4/5    132 142 4/5   End range pain    adduction   Able to reach to left post deltoid   28   4+/5  Able to reach to left posterior deltoid 28 4+/5   End range pain   Internal rotation Hand behind back to T8   85  4+/5  Hand behind back to T8 78 4+/5  No Pain   ER at 90° abd Hand behind head   75   4-/5 Hand behind head to C7 65 4-/5   Pain thru movement   ER at 0° abd   46   76   4/5 73 76 4-/5   End range pain            Pt prognosis is Good.     Pt will continue to benefit from skilled outpatient physical therapy to address the deficits listed in the problem list box on initial evaluation, provide pt/family education and to maximize pt's level of independence in the home and community environment. She is making progress toward her goals, but has not achieved the 2 week goals established.  Skilled Physical Therapy intervention is still appropriate - current goals remain appropriate.     Anticipated barriers to physical therapy:     Pt's spiritual, cultural and educational needs considered and pt agreeable to plan of care and goals.    10th Visit Re-assessment:  See ROM measurements above, improvement noted in these; UEFS has improved from 39% limitation to current of 30%.  Goals of <20% are still appropriate.   G-Code: Current: CJ; Goals: CH  Goals:     Short Term GOALS:  In 4 weeks, pt. will:  1. Subjective pain no more than 2/10 with daily activity - NOT MET  2. Able to demonstrate functional AROM in right shoulder thru all planes of motion - Improving, but still difficulty with range  3. Strength improved by 1/2 grade in flexion, abduction and ER - NOT MET  4. UEFS score improved to <20% limitation - NOT MET  Long Term GOALS:  In 8 weeks, pt. Will:  1. Report subjective pain 0/10 in right shoulder with daily use  2. Demonstrate 5/5 strength in Right shoulder in all planes  3. UEFS score with 0% limitation  4. be independent with HEP and SX management   Plan     Balaji will be seen 2x weeks for 12 weeks (or until Skilled services are no longer  required).   right shoulder manual therapy, there exercise and modalities as needed for pain and.or inflammation.  Encouraged Balaji to really focus on her ROM and scapular exercises.     Sofía Martines, PT

## 2018-08-07 ENCOUNTER — CLINICAL SUPPORT (OUTPATIENT)
Dept: REHABILITATION | Facility: HOSPITAL | Age: 68
End: 2018-08-07
Payer: MEDICARE

## 2018-08-07 DIAGNOSIS — G89.29 CHRONIC RIGHT SHOULDER PAIN: ICD-10-CM

## 2018-08-07 DIAGNOSIS — M25.511 CHRONIC RIGHT SHOULDER PAIN: ICD-10-CM

## 2018-08-07 DIAGNOSIS — M62.81 MUSCLE WEAKNESS OF RIGHT UPPER EXTREMITY: ICD-10-CM

## 2018-08-07 DIAGNOSIS — M25.611 DECREASED ROM OF RIGHT SHOULDER: Primary | ICD-10-CM

## 2018-08-07 PROCEDURE — 97140 MANUAL THERAPY 1/> REGIONS: CPT | Mod: PN

## 2018-08-07 PROCEDURE — 97110 THERAPEUTIC EXERCISES: CPT | Mod: PN

## 2018-08-07 NOTE — PROGRESS NOTES
"                            Physical Therapy Daily Treatment Note     Name: Marleni Carpio [facility]  Clinic Number: [id number]    Therapy Diagnosis:  Physician: Catrachito Woods MD    Physician Orders: PT Eval and Tx  Medical Diagnosis: s/p RTC Repair, Stiffness, scapular dyskinesia Right Shoulder   Evaluation Date: 18  Authorization period Expiration: 18  Plan of Care Certification Period: 2018    Visit #: 17 / Visits authorized: 36    Time In:  9:00 AM  Time Out:  10:20 AM  Total Billable Time: 30 minutes    Precautions: Standard    Subjective     Pt reports: Homa is reporting general pain around her right shoulder - stiffness continues; limited exercise or stretching at home. She has been dealing with car issues as well as her Bipolar son- just hasn't had time or inclination to do much.   Response to previous treatment:"My shoulder is just alittle sore"  Functional change: none    Pain:   6 /10  Location: shoulder  right    Objective     HOMA received therapeutic exercises to develop strength and ROM for 30 minutes includin. UBE x 12 mins  2. Serratus Punches : 2# x 15 reps  3. Sidelying Ext Rotation: 2# x 15 reps  4. Prone: extension, rows and abduction: 2# x 15 reps each  5. Corner stretches for Pec Minor: 30 sec hold x 3 reps  6. Wall Slides - 3 mins  7. Wall Push-ups x 15 reps  8. 3-way Scapular Retraction - black TB 15 reps each  9. Forearm wall slides x 10 reps  10. Standing shoulder IR/ER:  Green TB - 15 reps each - changed ER to Red-TB  10 x 2 - verbal and tactile cueing for better posture and technique to move scapua, not rotate trunk. - better technique noted.   11. Pulleys x 5 mins    HOMA received the following manual therapy techniques: Joint mobilizations were applied to the: right shoulder for 25 minutes - spent extra time with manual treatment today as she was unable to come last week at all. , including:  Glenohumeral grade II to III jt mobilization to all planes; " STM-sub-scapularis release;  Scapular mobilization in all planes in sidelying; Lateral glides to bring scapula away from thorax and stretch pec minor on Right side. IASTM to entire deltoid, biceps/triceps mm as well as subscapularis.       HOMA participated in neuromuscular re-education activities to improve:  for  minutes, including:     HOMA received the following supervised modalities after being cleared for contradictions: Moist Heat x 15 mins to right shoulder      Home Exercises Provided and Patient Education Provided     Education provided:   - progress towards goals   - role of therapy       Written Home Exercises Provided: corner stretches, wall slides and pulleys to be performed daily.  Exercises were reviewed and   HOMA was able to demonstrate them prior to the end of the session.   Pt received a written copy of exercises to perform at home.     HOMA demonstrated good  understanding of the education provided.     Assessment       HOMA is progressing well towards her goals.SHe is demonstrating a slow but steady improvement in her AROM; Still noting pain, but reports it is less intense with movement. Reminder to continue to work on shoulder ROM - Homa is not as consistent as she needs to be with follow through of exercises. Encouraged her to stretch daily as she demonstrates little follow up with exercises at home.      Current ROM Measurement (8/2/18)    ROM:   Shoulder  8/2/2018 Right    6/14/2018 Right   Pain/Dysfunction with Movement     AROM PROM MMT AROM PROM MMT      flexion   155   157   4/5   146 148 4/5   End range pain   extension    43   55   4+/5     46   55 4+/5   No c/o    abduction 148   148   4/5    132 142 4/5   End range pain   adduction   Able to reach to left post deltoid   28   4+/5  Able to reach to left posterior deltoid 28 4+/5   End range pain   Internal rotation Hand behind back to T8   85  4+/5  Hand behind back to T8 78 4+/5  No Pain   ER at 90° abd Hand behind head   75   4-/5  Hand behind head to C7 65 4-/5   Pain thru movement   ER at 0° abd   46   76   4/5 73 76 4-/5   End range pain            Pt prognosis is Good.     Pt will continue to benefit from skilled outpatient physical therapy to address the deficits listed in the problem list box on initial evaluation, provide pt/family education and to maximize pt's level of independence in the home and community environment. She is making progress toward her goals, but has not achieved the 2 week goals established.  Skilled Physical Therapy intervention is still appropriate - current goals remain appropriate.     Anticipated barriers to physical therapy:     Pt's spiritual, cultural and educational needs considered and pt agreeable to plan of care and goals.      Short Term GOALS:  In 4 weeks, pt. will:  1. Subjective pain no more than 2/10 with daily activity - NOT MET  2. Able to demonstrate functional AROM in right shoulder thru all planes of motion - Improving, but still difficulty with range  3. Strength improved by 1/2 grade in flexion, abduction and ER - NOT MET  4. UEFS score improved to <20% limitation - NOT MET  Long Term GOALS:  In 8 weeks, pt. Will:  1. Report subjective pain 0/10 in right shoulder with daily use  2. Demonstrate 5/5 strength in Right shoulder in all planes  3. UEFS score with 0% limitation  4. be independent with HEP and SX management   Plan     Balaji will be seen 2x weeks for 12 weeks (or until Skilled services are no longer required).   right shoulder manual therapy, there exercise and modalities as needed for pain and.or inflammation.  Encouraged Balaji to really focus on her ROM and scapular exercises.     Sofía Martines, PT

## 2018-08-09 ENCOUNTER — CLINICAL SUPPORT (OUTPATIENT)
Dept: REHABILITATION | Facility: HOSPITAL | Age: 68
End: 2018-08-09
Payer: MEDICARE

## 2018-08-09 DIAGNOSIS — M62.81 MUSCLE WEAKNESS OF RIGHT UPPER EXTREMITY: ICD-10-CM

## 2018-08-09 DIAGNOSIS — M25.611 DECREASED ROM OF RIGHT SHOULDER: Primary | ICD-10-CM

## 2018-08-09 PROCEDURE — 97140 MANUAL THERAPY 1/> REGIONS: CPT | Mod: PN

## 2018-08-09 PROCEDURE — 97110 THERAPEUTIC EXERCISES: CPT | Mod: PN

## 2018-08-09 NOTE — PROGRESS NOTES
"                            Physical Therapy Daily Treatment Note     Name: Marleni Carpio [facility]  Clinic Number: [id number]    Therapy Diagnosis:  Physician: Catrachito Woods MD    Physician Orders: PT Eval and Tx  Medical Diagnosis: s/p RTC Repair, Stiffness, scapular dyskinesia Right Shoulder   Evaluation Date: 5/28/18  Authorization period Expiration: 12/31/18  Plan of Care Certification Period: 8/30/2018    Visit #: 18 / Visits authorized: 36    Time In:  9:00 AM  Time Out:  10:15 AM  Total Billable Time: 30 minutes    Precautions: Standard    Subjective     Pt reports: Homa is reporting less stiffness in her shoulder today; pain not as much  Response to previous treatment:"My shoulder is just alittle sore"  Functional change: none    Pain:   5 /10  Location: shoulder  right    Objective     HOMA received therapeutic exercises to develop strength and ROM for 30 minutes including: only exercises in bold today       1. UBE x 12 mins  2. Serratus Punches : 2# x 15 reps  3. Sidelying Ext Rotation: 1# x 15 reps  4. Prone: extension, rows : 2# x 20 reps each; Shoulder abduction 1# x 20 reps  5. Corner stretches for Pec Minor: 30 sec hold x 3 reps  6. Wall Slides - 3 mins  7. Wall Push-ups x 15 reps  8. 3-way Scapular Retraction - black TB 15 reps each  9. Forearm wall slides x 10 reps  10. Standing shoulder IR/ER:  Green TB - 15 reps each - changed ER to Red-TB  10 x 2 - verbal and tactile cueing for better posture and technique to move scapua, not rotate trunk. - better technique noted.   11. Pulleys x 5 mins    HOMA received the following manual therapy techniques: Joint mobilizations were applied to the: right shoulder for 25 minutes - spent extra time with manual treatment today as she was unable to come last week at all. , including:  Glenohumeral grade II to III jt mobilization to all planes; STM-sub-scapularis release;  Scapular mobilization in all planes in sidelying; Lateral glides to bring scapula away from " thorax and stretch pec minor on Right side. Posterior capsule stretch and sleeper stretch performed.        HOMA participated in neuromuscular re-education activities to improve:  for  minutes, including:     HOMA received the following supervised modalities after being cleared for contradictions: Moist Heat x 15 mins to right shoulder      Home Exercises Provided and Patient Education Provided     Education provide Reminder d:   - progress towards goals   - role of therapy       Written Home Exercises Provided: corner stretches, wall slides and pulleys to be performed daily.  Exercises were reviewed and   HOMA was able to demonstrate them prior to the end of the session.   Pt received a written copy of exercises to perform at home.     HOMA demonstrated good  understanding of the education provided.     Assessment       HOMA is progressing well towards her goals.SHe is demonstrating a slow but steady improvement in her AROM; Still noting pain, but reports it is less intense with movement.to continue to work on shoulder ROM - Homa is not as consistent as she needs to be with follow through of exercises. Encouraged her to stretch daily as she demonstrates little follow up with exercises at home.      Current ROM Measurement (8/2/18)    ROM:   Shoulder  8/2/2018 Right    6/14/2018 Right   Pain/Dysfunction with Movement     AROM PROM MMT AROM PROM MMT      flexion   155   157   4/5   146 148 4/5   End range pain   extension    43   55   4+/5     46   55 4+/5   No c/o    abduction 148   148   4/5    132 142 4/5   End range pain   adduction   Able to reach to left post deltoid   28   4+/5  Able to reach to left posterior deltoid 28 4+/5   End range pain   Internal rotation Hand behind back to T8   85  4+/5  Hand behind back to T8 78 4+/5  No Pain   ER at 90° abd Hand behind head   75   4-/5 Hand behind head to C7 65 4-/5   Pain thru movement   ER at 0° abd   46   76   4/5 73 76 4-/5   End range pain            Pt  prognosis is Good.     Pt will continue to benefit from skilled outpatient physical therapy to address the deficits listed in the problem list box on initial evaluation, provide pt/family education and to maximize pt's level of independence in the home and community environment. She is making progress toward her goals, but has not achieved the 2 week goals established.  Skilled Physical Therapy intervention is still appropriate - current goals remain appropriate.     Anticipated barriers to physical therapy:     Pt's spiritual, cultural and educational needs considered and pt agreeable to plan of care and goals.      Short Term GOALS:  In 4 weeks, pt. will:  1. Subjective pain no more than 2/10 with daily activity - NOT MET  2. Able to demonstrate functional AROM in right shoulder thru all planes of motion - Improving, but still difficulty with range  3. Strength improved by 1/2 grade in flexion, abduction and ER - NOT MET  4. UEFS score improved to <20% limitation - NOT MET  Long Term GOALS:  In 8 weeks, pt. Will:  1. Report subjective pain 0/10 in right shoulder with daily use  2. Demonstrate 5/5 strength in Right shoulder in all planes  3. UEFS score with 0% limitation  4. be independent with HEP and SX management   Plan     Balaji will be seen 2x weeks for 12 weeks (or until Skilled services are no longer required).   right shoulder manual therapy, there exercise and modalities as needed for pain and.or inflammation.  Encouraged Balaji to really focus on her ROM and scapular exercises.     Sofía Martines, PT

## 2018-08-14 ENCOUNTER — CLINICAL SUPPORT (OUTPATIENT)
Dept: REHABILITATION | Facility: HOSPITAL | Age: 68
End: 2018-08-14
Payer: MEDICARE

## 2018-08-14 DIAGNOSIS — M62.81 MUSCLE WEAKNESS OF RIGHT UPPER EXTREMITY: ICD-10-CM

## 2018-08-14 DIAGNOSIS — M25.611 DECREASED ROM OF RIGHT SHOULDER: Primary | ICD-10-CM

## 2018-08-14 PROCEDURE — 97010 HOT OR COLD PACKS THERAPY: CPT | Mod: PN

## 2018-08-14 PROCEDURE — 97110 THERAPEUTIC EXERCISES: CPT | Mod: PN

## 2018-08-14 NOTE — PROGRESS NOTES
"                            Physical Therapy Daily Treatment Note     Name: Marleni Carpio [facility]  Clinic Number: [id number]    Therapy Diagnosis:  Physician: Catrachito Woods MD    Physician Orders: PT Eval and Tx  Medical Diagnosis: s/p RTC Repair, Stiffness, scapular dyskinesia Right Shoulder   Evaluation Date: 5/28/18  Authorization period Expiration: 12/31/18  Plan of Care Certification Period: 8/30/2018    Visit #: 19 / Visits authorized: 36    Time In:  9:15 AM  Time Out: 10:15   AM  Total Billable Time: 30 minutes    Precautions: Standard    Subjective     Pt reports: "My arm is killing me today."  Response to previous treatment: "My arm has been hurting since last therapy session."  Functional change: none    Pain:   7-8 /10  Location: shoulder  right    Objective     HOMA received therapeutic exercises to develop strength and ROM for 30 minutes including: only exercises in bold today       1. UBE x 12 mins  2. Serratus Punches : 2# x 15 reps  3. Sidelying Ext Rotation: 1# x 15 reps  4. Prone: extension, rows : 2# x 20 reps each; Shoulder abduction 1# x 20 reps  5. Corner stretches for Pec Minor: 30 sec hold x 3 reps  6. Wall Slides - 3 mins  7. Wall Push-ups x 15 reps  8. 3-way Scapular Retraction - grey TB 15 reps each  9. Forearm wall slides x 10 reps  10. Standing shoulder IR/ER:  Green TB - 15 reps each - changed ER to Red-TB  10 x 2 - verbal and tactile cueing for better posture and technique to move scapua, not rotate trunk. - better technique noted.   11. Pulleys x 5 mins    KT tape to right shoulder    DNP  HOMA received the following manual therapy techniques: Joint mobilizations were applied to the: right shoulder for 25 minutes - spent extra time with manual treatment today as she was unable to come last week at all. , including:  Glenohumeral grade II to III jt mobilization to all planes; STM-sub-scapularis release;  Scapular mobilization in all planes in sidelying; Lateral glides to bring scapula " away from thorax and stretch pec minor on Right side. Posterior capsule stretch and sleeper stretch performed.        HOMA participated in neuromuscular re-education activities to improve:  for  minutes, including:     HOMA received the following supervised modalities after being cleared for contradictions: Moist Heat x 15 mins to right shoulder      Home Exercises Provided and Patient Education Provided     Education provide Reminder d:   - progress towards goals   - role of therapy       Written Home Exercises Provided: corner stretches, wall slides and pulleys to be performed daily.  Exercises were reviewed and   HOMA was able to demonstrate them prior to the end of the session.   Pt received a written copy of exercises to perform at home.     HOMA demonstrated good  understanding of the education provided.     Assessment       HOMA is progressing well towards her goals. Homa is not as consistent as she needs to be with follow through of exercises. Encouraged her to stretch daily as she demonstrates little follow up with exercises at home. Limited tolerance of exercise secondary to increased shoulder pain today.     Current ROM Measurement (8/2/18)    ROM:   Shoulder  8/2/2018 Right    6/14/2018 Right   Pain/Dysfunction with Movement     AROM PROM MMT AROM PROM MMT      flexion   155   157   4/5   146 148 4/5   End range pain   extension    43   55   4+/5     46   55 4+/5   No c/o    abduction 148   148   4/5    132 142 4/5   End range pain   adduction   Able to reach to left post deltoid   28   4+/5  Able to reach to left posterior deltoid 28 4+/5   End range pain   Internal rotation Hand behind back to T8   85  4+/5  Hand behind back to T8 78 4+/5  No Pain   ER at 90° abd Hand behind head   75   4-/5 Hand behind head to C7 65 4-/5   Pain thru movement   ER at 0° abd   46   76   4/5 73 76 4-/5   End range pain            Pt prognosis is Good.     Pt will continue to benefit from skilled outpatient physical  therapy to address the deficits listed in the problem list box on initial evaluation, provide pt/family education and to maximize pt's level of independence in the home and community environment. She is making progress toward her goals, but has not achieved the 2 week goals established.  Skilled Physical Therapy intervention is still appropriate - current goals remain appropriate.     Anticipated barriers to physical therapy:     Pt's spiritual, cultural and educational needs considered and pt agreeable to plan of care and goals.      Short Term GOALS:  In 4 weeks, pt. will:  1. Subjective pain no more than 2/10 with daily activity - NOT MET  2. Able to demonstrate functional AROM in right shoulder thru all planes of motion - Improving, but still difficulty with range  3. Strength improved by 1/2 grade in flexion, abduction and ER - NOT MET  4. UEFS score improved to <20% limitation - NOT MET  Long Term GOALS:  In 8 weeks, pt. Will:  1. Report subjective pain 0/10 in right shoulder with daily use  2. Demonstrate 5/5 strength in Right shoulder in all planes  3. UEFS score with 0% limitation  4. be independent with HEP and SX management   Plan     Balaji will be seen 2x weeks for 12 weeks (or until Skilled services are no longer required).   right shoulder manual therapy, there exercise and modalities as needed for pain and.or inflammation.  Encouraged Balaji to really focus on her ROM and scapular exercises.     Jonathan Favre, PTA

## 2018-08-21 ENCOUNTER — CLINICAL SUPPORT (OUTPATIENT)
Dept: REHABILITATION | Facility: HOSPITAL | Age: 68
End: 2018-08-21
Payer: MEDICARE

## 2018-08-21 DIAGNOSIS — M62.81 MUSCLE WEAKNESS OF RIGHT UPPER EXTREMITY: ICD-10-CM

## 2018-08-21 DIAGNOSIS — G89.29 CHRONIC RIGHT SHOULDER PAIN: ICD-10-CM

## 2018-08-21 DIAGNOSIS — M25.611 DECREASED ROM OF RIGHT SHOULDER: Primary | ICD-10-CM

## 2018-08-21 DIAGNOSIS — M25.511 CHRONIC RIGHT SHOULDER PAIN: ICD-10-CM

## 2018-08-21 PROCEDURE — 97140 MANUAL THERAPY 1/> REGIONS: CPT | Mod: PN

## 2018-08-21 PROCEDURE — 97110 THERAPEUTIC EXERCISES: CPT | Mod: PN

## 2018-08-21 NOTE — PROGRESS NOTES
"                            Physical Therapy Daily Treatment Note     Name: Marleni Carpio [facility]  Clinic Number: [id number]    Therapy Diagnosis:  Physician: Catrachito Woods MD    Physician Orders: PT Eval and Tx  Medical Diagnosis: s/p RTC Repair, Stiffness, scapular dyskinesia Right Shoulder   Evaluation Date: 5/28/18  Authorization period Expiration: 12/31/18  Plan of Care Certification Period: 8/30/2018    Visit #: 20 / Visits authorized: 36    Time In:  9:05 AM  Time Out: 10:20   AM  Total Billable Time: 30 minutes    Precautions: Standard    Subjective     Pt reports: "My arm is killing me today."  Response to previous treatment: "My arm has been hurting since last therapy session."  Functional change: none    Pain:   7-8 /10  Location: shoulder  right    Objective     HOMA received therapeutic exercises to develop strength and ROM for 30 minutes including: only exercises in bold today       1. UBE x 12 mins  2. Serratus Punches : 2# x 15 reps  3. Sidelying Ext Rotation: 1# x 15 reps  4. Prone: extension, rows : 2# x 20 reps each; Shoulder abduction 1# x 20 reps  5. Corner stretches for Pec Minor: 30 sec hold x 3 reps  6. Wall Slides - 3 mins  7. Wall Push-ups x 15 reps  8. 3-way Scapular Retraction - grey TB 15 reps each  9. Forearm wall slides x 10 reps  10. Standing shoulder IR/ER:  Green TB - 15 reps each - changed ER to Red-TB  10 x 2 - verbal and tactile cueing for better posture and technique to move scapua, not rotate trunk. - better technique noted.   11. Pulleys x 5 mins    KT tape to right shoulder      HOMA received the following manual therapy techniques: Joint mobilizations were applied to the: right shoulder for 25 minutes - spent extra time with manual treatment today as she was unable to come last week at all. , including:  Glenohumeral grade II to III jt mobilization to all planes; STM-sub-scapularis release;  Scapular mobilization in all planes in sidelying; Lateral glides to bring scapula " away from thorax and stretch pec minor on Right side. Posterior capsule stretch and sleeper stretch performed.        HOMA participated in neuromuscular re-education activities to improve:  for  minutes, including:     HOMA received the following supervised modalities after being cleared for contradictions: Moist Heat x 15 mins to right shoulder      Home Exercises Provided and Patient Education Provided     Education provide Reminder d:   - progress towards goals   - role of therapy       Written Home Exercises Provided: corner stretches, wall slides and pulleys to be performed daily.  Exercises were reviewed and   HOMA was able to demonstrate them prior to the end of the session.   Pt received a written copy of exercises to perform at home.     HOMA demonstrated good  understanding of the education provided.     Assessment       HOMA is progressing well towards her goals. Homa is not as consistent as she needs to be with follow through of exercises. Encouraged her to stretch daily as she demonstrates little follow up with exercises at home. Limited tolerance of exercise secondary to increased shoulder pain today.     Current ROM Measurement (8/21/18) - slight loss of active/passive range noted; Homa has not been as consistent with appointments or HEP.     ROM:   Shoulder  8/21/2018 Right    8/02/2018 Right   Pain/Dysfunction with Movement     AROM PROM MMT AROM PROM MMT      flexion 140 148   4/5 155 157 4/5   End range pain   extension    43   55   4+/5 43 55 4+/5   No c/o    abduction 130 138 4-/5 148 148 4/5   End range pain   adduction   Able to reach to left post deltoid   28   4+/5  Able to reach to left posterior deltoid 28 4+/5   End range pain   Internal rotation Hand behind back to T8 88  4+/5  Hand behind back to T8 85 4+/5  No Pain   ER at 90° abd Hand behind head 65   4-/5 Hand behind head to C7 75 4-/5   Pain thru movement   ER at 0° abd 80 85   4/5 46 76 4/5   End range pain            Pt prognosis  is Good.     Pt will continue to benefit from skilled outpatient physical therapy to address the deficits listed in the problem list box on initial evaluation, provide pt/family education and to maximize pt's level of independence in the home and community environment. She is making progress toward her goals, but has not achieved the 2 week goals established.  Skilled Physical Therapy intervention is still appropriate - current goals remain appropriate.     Anticipated barriers to physical therapy:     Pt's spiritual, cultural and educational needs considered and pt agreeable to plan of care and goals.      Short Term GOALS:  In 4 weeks, pt. will:  1. Subjective pain no more than 2/10 with daily activity - NOT MET  2. Able to demonstrate functional AROM in right shoulder thru all planes of motion - Improving, but still difficulty with range  3. Strength improved by 1/2 grade in flexion, abduction and ER - NOT MET  4. UEFS score improved to <20% limitation - NOT MET  Long Term GOALS:  In 8 weeks, pt. Will:  1. Report subjective pain 0/10 in right shoulder with daily use  2. Demonstrate 5/5 strength in Right shoulder in all planes  3. UEFS score with 0% limitation  4. be independent with HEP and SX management   Plan     Balaji will be seen 2x weeks for 12 weeks (or until Skilled services are no longer required).   right shoulder manual therapy, there exercise and modalities as needed for pain and.or inflammation.  Encouraged Balaji to really focus on her ROM and scapular exercises.     Sofía Martines, PT

## 2018-08-23 ENCOUNTER — CLINICAL SUPPORT (OUTPATIENT)
Dept: REHABILITATION | Facility: HOSPITAL | Age: 68
End: 2018-08-23
Payer: MEDICARE

## 2018-08-23 DIAGNOSIS — G89.29 CHRONIC RIGHT SHOULDER PAIN: ICD-10-CM

## 2018-08-23 DIAGNOSIS — M25.511 CHRONIC RIGHT SHOULDER PAIN: ICD-10-CM

## 2018-08-23 DIAGNOSIS — M62.81 MUSCLE WEAKNESS OF RIGHT UPPER EXTREMITY: ICD-10-CM

## 2018-08-23 DIAGNOSIS — M25.611 DECREASED ROM OF RIGHT SHOULDER: Primary | ICD-10-CM

## 2018-08-23 PROCEDURE — 97140 MANUAL THERAPY 1/> REGIONS: CPT | Mod: PN

## 2018-08-23 PROCEDURE — 97110 THERAPEUTIC EXERCISES: CPT | Mod: PN

## 2018-08-23 NOTE — PROGRESS NOTES
"                            Physical Therapy Daily Treatment Note     Name: Marleni Carpio [facility]  Clinic Number: [id number]    Therapy Diagnosis:  Physician: Catrachito Woods MD    Physician Orders: PT Eval and Tx  Medical Diagnosis: s/p RTC Repair, Stiffness, scapular dyskinesia Right Shoulder   Evaluation Date: 18  Authorization period Expiration: 18  Plan of Care Certification Period: 2018    Visit #: 21 / Visits authorized: 36    Time In:  9:05 AM  Time Out: 10:20   AM  Total Billable Time: 30 minutes    Precautions: Standard    Subjective     Pt reports: "My arm is killing me today."  Response to previous treatment: "My arm has been hurting since last therapy session."  Functional change: none    Pain:   7-8 /10  Location: shoulder  right    Objective     HOMA received therapeutic exercises to develop strength and ROM for 30 minutes includin. UBE x 12 mins  2. Serratus Punches : 2# x 15 reps  3. Sidelying Ext Rotation: 1# x 15 reps  4. Prone: extension, rows : 2# x 20 reps each; Shoulder abduction 1# x 20 reps  5. Corner stretches for Pec Minor: 30 sec hold x 3 reps  6. Wall Slides - 3 mins  7. Wall Push-ups x 15 reps  8. 3-way Scapular Retraction - grey TB 15 reps each  9. Forearm wall slides x 10 reps  10. Standing shoulder IR/ER:  Green TB - 15 reps each - changed ER to Red-TB  10 x 2 - verbal and tactile cueing for better posture and technique to move scapua, not rotate trunk. - better technique noted.   11. Pulleys x 5 mins        HOMA received the following manual therapy techniques: Joint mobilizations were applied to the: right shoulder for 25 minutes - spent extra time with manual treatment today as she was unable to come last week at all. , including:  Glenohumeral grade II to III jt mobilization to all planes; STM-sub-scapularis release;  Scapular mobilization in all planes in sidelying; Lateral glides to bring scapula away from thorax and stretch pec minor on Right side. " Posterior capsule stretch and sleeper stretch performed.        HOMA participated in neuromuscular re-education activities to improve:  for  minutes, including:     HOMA received the following supervised modalities after being cleared for contradictions: Moist Heat x 15 mins to right shoulder      Home Exercises Provided and Patient Education Provided     Education provide Reminder d:   - progress towards goals   - role of therapy       Written Home Exercises Provided: corner stretches, wall slides and pulleys to be performed daily.  Exercises were reviewed and   HOMA was able to demonstrate them prior to the end of the session.   Pt received a written copy of exercises to perform at home.     HOMA demonstrated good  understanding of the education provided.   herapy,   Assessment       HOMA is not progressing as fast as expected toward goals of restoring functional movement to her Right shoulder. She is not doing much of anything at home to assist her ROM and with 2x/weektherapy, we are just not seeing great progress at this point.  Homa is not as consistent as she needs to be with follow through of exercises. Encouraged her to stretch daily as she demonstrates little follow up with exercises at home. Tried to be more direct about her need to stretch at home if she wants to see improvement.     Current ROM Measurement (8/21/18) - slight loss of active/passive range noted; Homa has not been as consistent with appointments or HEP.     ROM:   Shoulder  8/21/2018 Right    8/02/2018 Right   Pain/Dysfunction with Movement     AROM PROM MMT AROM PROM MMT      flexion 140 148   4/5 155 157 4/5   End range pain   extension    43   55   4+/5 43 55 4+/5   No c/o    abduction 130 138 4-/5 148 148 4/5   End range pain   adduction   Able to reach to left post deltoid   28   4+/5  Able to reach to left posterior deltoid 28 4+/5   End range pain   Internal rotation Hand behind back to T8 88  4+/5  Hand behind back to T8 85 4+/5  No  Pain   ER at 90° abd Hand behind head 65   4-/5 Hand behind head to C7 75 4-/5   Pain thru movement   ER at 0° abd 80 85   4/5 46 76 4/5   End range pain            Pt prognosis is Good.     Pt will continue to benefit from skilled outpatient physical therapy to address the deficits listed in the problem list box on initial evaluation, provide pt/family education and to maximize pt's level of independence in the home and community environment. She is making progress toward her goals, but has not achieved the 2 week goals established.  Skilled Physical Therapy intervention is still appropriate - current goals remain appropriate.     Anticipated barriers to physical therapy:     Pt's spiritual, cultural and educational needs considered and pt agreeable to plan of care and goals.      Short Term GOALS:  In 4 weeks, pt. will:  1. Subjective pain no more than 2/10 with daily activity - NOT MET  2. Able to demonstrate functional AROM in right shoulder thru all planes of motion - Improving, but still difficulty with range  3. Strength improved by 1/2 grade in flexion, abduction and ER - NOT MET  4. UEFS score improved to <20% limitation - NOT MET  Long Term GOALS:  In 8 weeks, pt. Will:  1. Report subjective pain 0/10 in right shoulder with daily use  2. Demonstrate 5/5 strength in Right shoulder in all planes  3. UEFS score with 0% limitation  4. be independent with HEP and SX management   Plan     Balaji will be seen 2x weeks for 12 weeks (or until Skilled services are no longer required).   right shoulder manual therapy, there exercise and modalities as needed for pain and.or inflammation.  Encouraged Balaji to really focus on her ROM and scapular exercises.     Sofía Martines, PT

## 2018-08-27 ENCOUNTER — CLINICAL SUPPORT (OUTPATIENT)
Dept: REHABILITATION | Facility: HOSPITAL | Age: 68
End: 2018-08-27
Payer: MEDICARE

## 2018-08-27 DIAGNOSIS — M25.511 CHRONIC RIGHT SHOULDER PAIN: ICD-10-CM

## 2018-08-27 DIAGNOSIS — M25.611 DECREASED ROM OF RIGHT SHOULDER: Primary | ICD-10-CM

## 2018-08-27 DIAGNOSIS — G89.29 CHRONIC RIGHT SHOULDER PAIN: ICD-10-CM

## 2018-08-27 PROCEDURE — 97110 THERAPEUTIC EXERCISES: CPT | Mod: PN

## 2018-08-27 PROCEDURE — 97140 MANUAL THERAPY 1/> REGIONS: CPT | Mod: PN

## 2018-08-27 NOTE — PROGRESS NOTES
Physical Therapy Daily Treatment Note     Name: Marleni Carpio [facility] Number: [id number]    Therapy Diagnosis:  Physician: Catrachito Woods MD    Physician Orders: PT Eval and Tx  Medical Diagnosis: s/p RTC Repair, Stiffness, scapular dyskinesia Right Shoulder   Evaluation Date: 18  Authorization period Expiration: 18  Plan of Care Certification Period: 2018    Visit #: 22 / Visits authorized: 36    Time In:  9:20  AM  Time Out: 10: 30 AM  Total Billable Time: 30 minutes    Precautions: Standard    Subjective     Pt reports:Continues to reports general soreness in her arm; she is not doing exercises at home; minimal improvement from session to session as a result.   Response to previous treatment: Arm about the same  Functional change: none    Pain:   6-710  Location: shoulder  right    Objective     HOMA received therapeutic exercises to develop strength and ROM for 30 minutes includin. UBE x 12 mins  2. Serratus Punches : 2# x 15 reps  3. Sidelying Ext Rotation: 1# x 15 reps  4. Prone: extension, rows : 2# x 20 reps each; Shoulder abduction 1# x 20 reps  5. Corner stretches for Pec Minor: 30 sec hold x 3 reps  6. Wall Slides - 3 mins  7. Wall Push-ups x 15 reps  8. 3-way Scapular Retraction - grey TB 15 reps each  9. Forearm wall slides x 10 reps  10. Standing shoulder IR/ER:  Green TB - 15 reps each - changed ER to Red-TB  10 x 2 - verbal and tactile cueing for better posture and technique to move scapua, not rotate trunk. - better technique noted.   11. Pulleys x 5 mins        HOMA received the following manual therapy techniques: Joint mobilizations were applied to the: right shoulder for 20 minutes - spent extra time with manual treatment today as she was unable to come last week at all. , including:  Glenohumeral grade II to III jt mobilization to all planes; STM-sub-scapularis release;  Scapular mobilization in all planes in sidelying; Lateral glides  to bring scapula away from thorax and stretch pec minor on Right side. Posterior capsule stretch and sleeper stretch performed.        HOMA participated in neuromuscular re-education activities to improve:  for  minutes, including:     HOMA received the following supervised modalities after being cleared for contradictions: Moist Heat x 15 mins to right shoulder      Home Exercises Provided and Patient Education Provided     Education provide Reminder d:   - progress towards goals   - role of therapy       Written Home Exercises Provided: corner stretches, wall slides and pulleys to be performed daily.  Exercises were reviewed and   HOMA was able to demonstrate them prior to the end of the session.   Pt received a written copy of exercises to perform at home.     HOMA demonstrated good  understanding of the education provided. However she is not stretching at home.     Assessment       HOMA is not progressing as fast as expected toward goals of restoring functional movement to her Right shoulder. She is not doing much of anything at home to assist her ROM and with 2x/weektherapy, we are just not seeing great progress at this point.  Homa is not as consistent as she needs to be with follow through of exercises. Encouraged her to stretch daily as she demonstrates little follow up with exercises at home. Tried to be more direct about her need to stretch at home if she wants to see improvement.     Current ROM Measurement (8/21/18) - slight loss of active/passive range noted; Homa has not been as consistent with appointments or HEP.     ROM:   Shoulder  8/21/2018 Right    8/02/2018 Right   Pain/Dysfunction with Movement     AROM PROM MMT AROM PROM MMT      flexion 140 148   4/5 155 157 4/5   End range pain   extension    43   55   4+/5 43 55 4+/5   No c/o    abduction 130 138 4-/5 148 148 4/5   End range pain   adduction   Able to reach to left post deltoid   28   4+/5  Able to reach to left posterior deltoid 28 4+/5    End range pain   Internal rotation Hand behind back to T8 88  4+/5  Hand behind back to T8 85 4+/5  No Pain   ER at 90° abd Hand behind head 65   4-/5 Hand behind head to C7 75 4-/5   Pain thru movement   ER at 0° abd 80 85   4/5 46 76 4/5   End range pain            Pt prognosis is Good.     Pt will continue to benefit from skilled outpatient physical therapy to address the deficits listed in the problem list box on initial evaluation, provide pt/family education and to maximize pt's level of independence in the home and community environment. She is making progress toward her goals, but has not achieved the 2 week goals established.  Skilled Physical Therapy intervention is still appropriate - current goals remain appropriate.     Anticipated barriers to physical therapy:     Pt's spiritual, cultural and educational needs considered and pt agreeable to plan of care and goals.      Short Term GOALS:  In 4 weeks, pt. will:  1. Subjective pain no more than 2/10 with daily activity - NOT MET  2. Able to demonstrate functional AROM in right shoulder thru all planes of motion - Improving, but still difficulty with range  3. Strength improved by 1/2 grade in flexion, abduction and ER - NOT MET  4. UEFS score improved to <20% limitation - NOT MET  Long Term GOALS:  In 8 weeks, pt. Will:  1. Report subjective pain 0/10 in right shoulder with daily use  2. Demonstrate 5/5 strength in Right shoulder in all planes  3. UEFS score with 0% limitation  4. be independent with HEP and SX management   Plan     aBlaji will be seen 2x weeks for 3weeks (or until Skilled services are no longer required).   right shoulder manual therapy, there exercise and modalities as needed for pain and.or inflammation.  Encouraged Balaji to really focus on her ROM and scapular exercises. She returns to Dr. Woods on September 18th.     Sofía Martines, PT

## 2018-08-30 ENCOUNTER — CLINICAL SUPPORT (OUTPATIENT)
Dept: REHABILITATION | Facility: HOSPITAL | Age: 68
End: 2018-08-30
Payer: MEDICARE

## 2018-08-30 DIAGNOSIS — M25.611 DECREASED ROM OF RIGHT SHOULDER: Primary | ICD-10-CM

## 2018-08-30 DIAGNOSIS — G89.29 CHRONIC RIGHT SHOULDER PAIN: ICD-10-CM

## 2018-08-30 DIAGNOSIS — M25.511 CHRONIC RIGHT SHOULDER PAIN: ICD-10-CM

## 2018-08-30 DIAGNOSIS — M62.81 MUSCLE WEAKNESS OF RIGHT UPPER EXTREMITY: ICD-10-CM

## 2018-08-30 PROCEDURE — 97110 THERAPEUTIC EXERCISES: CPT | Mod: PN

## 2018-08-30 PROCEDURE — 97140 MANUAL THERAPY 1/> REGIONS: CPT | Mod: PN

## 2018-08-30 NOTE — PROGRESS NOTES
Physical Therapy Daily Treatment Note     Name: Marleni aCrpio [facility] Number: [id number]    Therapy Diagnosis:  Physician: Catrachito Woods MD    Physician Orders: PT Eval and Tx  Medical Diagnosis: s/p RTC Repair, Stiffness, scapular dyskinesia Right Shoulder   Evaluation Date: 18  Authorization period Expiration: 18  Plan of Care Certification Period: 2018    Visit #: 22 / Visits authorized: 36    Time In:  9:20  AM  Time Out: 10: 13 AM  Total Billable Time: 30 minutes    Precautions: Standard    Subjective     Pt reports:Continues to reports general soreness in her arm; she is not doing exercises at home; minimal improvement from session to session as a result.   Response to previous treatment: Arm about the same  Functional change: none    Pain:   6-710  Location: shoulder  right    Objective     HOMA received therapeutic exercises to develop strength and ROM for 30 minutes includin. UBE x 12 mins  2. Serratus Punches : 2# x 15 reps  3. Sidelying Ext Rotation: 1# x 15 reps  4. Prone: extension, rows : 2# x 20 reps each; Shoulder abduction 1# x 20 reps  5. Corner stretches for Pec Minor: 30 sec hold x 3 reps  6. Wall Slides - 3 mins  7. Wall Push-ups x 15 reps  8. 3-way Scapular Retraction - grey TB 15 reps each  9. Forearm wall slides x 10 reps  10. Standing shoulder IR/ER:  Green TB - 15 reps each - changed ER to Red-TB  10 x 2 - verbal and tactile cueing for better posture and technique to move scapua, not rotate trunk. - better technique noted.   11. Pulleys x 5 mins        HOMA received the following manual therapy techniques: Joint mobilizations were applied to the: right shoulder for 20 minutes - spent extra time with manual treatment today as she was unable to come last week at all. , including:  Glenohumeral grade II to III jt mobilization to all planes; STM-sub-scapularis release;  Scapular mobilization in all planes in sidelying; Lateral glides  to bring scapula away from thorax and stretch pec minor on Right side. Posterior capsule stretch and sleeper stretch performed.        HOMA participated in neuromuscular re-education activities to improve:  for  minutes, including:     HOMA received the following supervised modalities after being cleared for contradictions: Moist Heat x 15 mins to right shoulder      Home Exercises Provided and Patient Education Provided     Education provide Reminder d:   - progress towards goals   - role of therapy       Written Home Exercises Provided: corner stretches, wall slides and pulleys to be performed daily.  Exercises were reviewed and   HOMA was able to demonstrate them prior to the end of the session.   Pt received a written copy of exercises to perform at home.     HOMA demonstrated good  understanding of the education provided. However she is not stretching at home.     Assessment       HOMA is not progressing as fast as expected toward goals of restoring functional movement to her Right shoulder. She is not doing much of anything at home to assist her ROM and with 2x/weektherapy, we are just not seeing great progress at this point.  Homa is not as consistent as she needs to be with follow through of exercises. Encouraged her to stretch daily as she demonstrates little follow up with exercises at home.Was very direct about her need to stretch at home if she wants to see improvement. Homa wanted to talk about what I thought Dr. Woods might want to do with her shoulder at her next visit - I suspect probable manipulation as opposed to any additional surgery if the Xray does not reveal anything unexpected.     Pulled old chart to look at ROM measurements from previous visits back in March 2017 - AROM in Right shoulder at that time was Flexion 153; Extension 62; Abduction 130; IR - to mid thoracic spine; ER: HBH to C5  Homa is almost at these measurements, but has more pain now then she did a year ago.  Homa has not had  any significant incidents occur since this time, she just has not been exercising her arm and now has developed adhesive capsulitis - unless there is some type of necrosis and fractures in the HH that were not seen on xray. Will continue to progress with ROM; aggressive manual Tx until her appointment with MD on the 18th of September.     Current ROM Measurement (8/21/18) - slight loss of active/passive range noted; Balaji has not been as consistent with appointments or HEP.     ROM:   Shoulder  8/30/2018 Right    8/02/2018 Right   Pain/Dysfunction with Movement     AROM PROM MMT AROM PROM MMT      flexion 145 148   4/5 155 157 4/5   End range pain   extension    43   55   4+/5 43 55 4+/5   No c/o    abduction 135 138 4-/5 148 148 4/5   End range pain   adduction   Able to reach to left post deltoid   28   4+/5  Able to reach to left posterior deltoid 28 4+/5   End range pain   Internal rotation Hand behind back to T8 88  4+/5  Hand behind back to T8 85 4+/5  No Pain   ER at 90° abd Hand behind head 65   4-/5 Hand behind head to C7 75 4-/5   Pain thru movement   ER at 0° abd 80 85   4/5 46 76 4/5   End range pain            Pt prognosis is Good.     Pt will continue to benefit from skilled outpatient physical therapy to address the deficits listed in the problem list box on initial evaluation, provide pt/family education and to maximize pt's level of independence in the home and community environment. She is making progress toward her goals, but has not achieved the 2 week goals established.  Skilled Physical Therapy intervention is still appropriate - current goals remain appropriate.     Anticipated barriers to physical therapy:     Pt's spiritual, cultural and educational needs considered and pt agreeable to plan of care and goals.      Short Term GOALS:  In 4 weeks, pt. will:  1. Subjective pain no more than 2/10 with daily activity - NOT MET  2. Able to demonstrate functional AROM in right shoulder thru all  planes of motion - Improving, but still difficulty with range  3. Strength improved by 1/2 grade in flexion, abduction and ER - NOT MET  4. UEFS score improved to <20% limitation - NOT MET  Long Term GOALS:  In 8 weeks, pt. Will:  1. Report subjective pain 0/10 in right shoulder with daily use  2. Demonstrate 5/5 strength in Right shoulder in all planes  3. UEFS score with 0% limitation  4. be independent with HEP and SX management   Plan     Balaji will be seen 2x weeks for 3weeks (or until Skilled services are no longer required).   right shoulder manual therapy, there exercise and modalities as needed for pain and.or inflammation.  Encouraged Balaji to really focus on her ROM and scapular exercises. She returns to Dr. Woods on September 18th.     Sofía Martines, PT

## 2018-09-04 ENCOUNTER — CLINICAL SUPPORT (OUTPATIENT)
Dept: REHABILITATION | Facility: HOSPITAL | Age: 68
End: 2018-09-04
Payer: MEDICARE

## 2018-09-04 DIAGNOSIS — M25.511 CHRONIC RIGHT SHOULDER PAIN: ICD-10-CM

## 2018-09-04 DIAGNOSIS — M25.611 DECREASED ROM OF RIGHT SHOULDER: Primary | ICD-10-CM

## 2018-09-04 DIAGNOSIS — M62.81 MUSCLE WEAKNESS OF RIGHT UPPER EXTREMITY: ICD-10-CM

## 2018-09-04 DIAGNOSIS — G89.29 CHRONIC RIGHT SHOULDER PAIN: ICD-10-CM

## 2018-09-04 PROCEDURE — 97140 MANUAL THERAPY 1/> REGIONS: CPT | Mod: PN

## 2018-09-04 PROCEDURE — 97110 THERAPEUTIC EXERCISES: CPT | Mod: PN

## 2018-09-04 NOTE — PROGRESS NOTES
Physical Therapy Daily Treatment Note     Name: Marleni Carpio [facility] Number: [id number]    Therapy Diagnosis:  Physician: Catrachito Woods MD    Physician Orders: PT Eval and Tx  Medical Diagnosis: s/p RTC Repair, Stiffness, scapular dyskinesia Right Shoulder   Evaluation Date: 18  Authorization period Expiration: 18  Plan of Care Certification Period: 2018    Visit #: 23 / Visits authorized: 36    Time In:  10:00  AM  Time Out: 11:10 AM  Total Billable Time: 30 minutes    Precautions: Standard    Subjective     Pt reports:Continues to reports general soreness in her shoulder, Homa stated that she has been doing her exercises more regularly since last week.   Response to previous treatment: Arm about the same  Functional change: none    Pain:   6-7/10  Location: shoulder  right    Objective     HOMA received therapeutic exercises to develop strength and ROM for 25 minutes includin. UBE x 12 mins  2. Serratus Punches : 2# x 15 reps  3. Sidelying Ext Rotation: 1# x 15 reps  4. Prone: extension, rows : 2# x 20 reps each; Shoulder abduction 1# x 20 reps  5. Corner stretches for Pec Minor: 30 sec hold x 3 reps  6. Wall Slides - 3 mins  7. Wall Push-ups x 15 reps  8. 3-way Scapular Retraction - grey TB 15 reps each  9. Forearm wall slides x 10 reps  10. Standing shoulder IR/ER:  Green TB - 15 reps each - changed ER to Red-TB  10 x 2 - verbal and tactile cueing for better posture and technique to move scapua, not rotate trunk. - better technique noted.   11. Pulleys x 5 mins        HOMA received the following manual therapy techniques: Joint mobilizations were applied to the: right shoulder for 20 minutes. , including:  Glenohumeral grade II to III jt mobilization to all planes; STM-sub-scapularis release;  Scapular mobilization in all planes in sidelying; Lateral glides to bring scapula away from thorax and stretch pec minor on Right side. Posterior capsule  stretch and sleeper stretch performed.  Increased time spent with sleeper stretch to address restrictions with sidelying ER to follow.       HOMA participated in neuromuscular re-education activities to improve:  for  minutes, including:     HOMA received the following supervised modalities after being cleared for contradictions: Moist Heat x 15 mins to right shoulder      Home Exercises Provided and Patient Education Provided     Education provide Reminder d:   - progress towards goals   - role of therapy       Written Home Exercises Provided: corner stretches, wall slides and pulleys to be performed daily.  Exercises were reviewed and   HOMA was able to demonstrate them prior to the end of the session.   Pt received a written copy of exercises to perform at home.     HOMA demonstrated good  understanding of the education provided. However she is not stretching at home.     Assessment       HOMA is not progressing as fast as expected toward goals of restoring functional movement to her Right shoulder. She is not doing much of anything at home to assist her ROM and with 2x/weektherapy, we are just not seeing great progress at this point.  Homa is not as consistent as she needs to be with follow through of exercises. Encouraged her to stretch daily as she demonstrates little follow up with exercises at home.Was very direct about her need to stretch at home if she wants to see improvement. Homa wanted to talk about what I thought Dr. Woods might want to do with her shoulder at her next visit - I suspect probable manipulation as opposed to any additional surgery if the Xray does not reveal anything unexpected.     Pulled old chart to look at ROM measurements from previous visits back in March 2017 - AROM in Right shoulder at that time was Flexion 153; Extension 62; Abduction 130; IR - to mid thoracic spine; ER: HBH to C5  Homa is almost at these measurements, but has more pain now then she did a year ago.  Homa has  not had any significant incidents occur since this time, she just has not been exercising her arm and now has developed adhesive capsulitis - unless there is some type of necrosis and fractures in the HH that were not seen on xray. Will continue to progress with ROM; aggressive manual Tx until her appointment with MD on the 18th of September.     Current ROM Measurement (8/21/18) - slight loss of active/passive range noted; Balaji has not been as consistent with appointments or HEP.     ROM:   Shoulder  8/30/2018 Right    8/02/2018 Right   Pain/Dysfunction with Movement     AROM PROM MMT AROM PROM MMT      flexion 145 148   4/5 155 157 4/5   End range pain   extension    43   55   4+/5 43 55 4+/5   No c/o    abduction 135 138 4-/5 148 148 4/5   End range pain   adduction   Able to reach to left post deltoid   28   4+/5  Able to reach to left posterior deltoid 28 4+/5   End range pain   Internal rotation Hand behind back to T8 88  4+/5  Hand behind back to T8 85 4+/5  No Pain   ER at 90° abd Hand behind head 65   4-/5 Hand behind head to C7 75 4-/5   Pain thru movement   ER at 0° abd 80 85   4/5 46 76 4/5   End range pain            Pt prognosis is Good.     Pt will continue to benefit from skilled outpatient physical therapy to address the deficits listed in the problem list box on initial evaluation, provide pt/family education and to maximize pt's level of independence in the home and community environment. She is making progress toward her goals, but has not achieved the 2 week goals established.  Skilled Physical Therapy intervention is still appropriate - current goals remain appropriate.     Anticipated barriers to physical therapy:     Pt's spiritual, cultural and educational needs considered and pt agreeable to plan of care and goals.      Short Term GOALS:  In 4 weeks, pt. will:  1. Subjective pain no more than 2/10 with daily activity - NOT MET  2. Able to demonstrate functional AROM in right shoulder thru  all planes of motion - Improving, but still difficulty with range  3. Strength improved by 1/2 grade in flexion, abduction and ER - NOT MET  4. UEFS score improved to <20% limitation - NOT MET  Long Term GOALS:  In 8 weeks, pt. Will:  1. Report subjective pain 0/10 in right shoulder with daily use  2. Demonstrate 5/5 strength in Right shoulder in all planes  3. UEFS score with 0% limitation  4. be independent with HEP and SX management   Plan     Balaji will be seen 2x weeks for 3weeks (or until Skilled services are no longer required).   right shoulder manual therapy, there exercise and modalities as needed for pain and.or inflammation.  Encouraged Balaji to really focus on her ROM and scapular exercises. She returns to Dr. Woods on September 18th.     Sofía Martines, PT

## 2018-09-07 ENCOUNTER — CLINICAL SUPPORT (OUTPATIENT)
Dept: REHABILITATION | Facility: HOSPITAL | Age: 68
End: 2018-09-07
Payer: MEDICARE

## 2018-09-07 DIAGNOSIS — M25.611 DECREASED ROM OF RIGHT SHOULDER: ICD-10-CM

## 2018-09-07 DIAGNOSIS — G89.29 CHRONIC RIGHT SHOULDER PAIN: Primary | ICD-10-CM

## 2018-09-07 DIAGNOSIS — M25.511 CHRONIC RIGHT SHOULDER PAIN: Primary | ICD-10-CM

## 2018-09-07 DIAGNOSIS — M62.81 MUSCLE WEAKNESS OF RIGHT UPPER EXTREMITY: ICD-10-CM

## 2018-09-07 PROCEDURE — 97110 THERAPEUTIC EXERCISES: CPT | Mod: PN

## 2018-09-07 PROCEDURE — 97140 MANUAL THERAPY 1/> REGIONS: CPT | Mod: PN

## 2018-09-07 NOTE — PROGRESS NOTES
"                            Physical Therapy Daily Treatment Note     Name: Marleni Carpio [facility] Number: [id number]    Therapy Diagnosis:  Physician: Catrachito Woods MD    Physician Orders: PT Eval and Tx  Medical Diagnosis: s/p RTC Repair, Stiffness, scapular dyskinesia Right Shoulder   Evaluation Date: 18  Authorization period Expiration: 18  Plan of Care Certification Period: 2018    Visit #: 24 / Visits authorized: 36    Time In:  9:10  AM  Time Out:10:15 AM  Total Billable Time: 30 minutes    Precautions: Standard    Subjective     Pt reports "My shoulder is not as sore today"   Response to previous treatment: Homa stated that her arm feels much better today, not as sore  Functional change: improved ROM    Pain:   4/10  Location: shoulder  right    Objective     HOMA received therapeutic exercises to develop strength and ROM for 25 minutes includin. UBE x 12 mins  2. Serratus Punches : 2# x 15 reps  3. Sidelying Ext Rotation: 1# x 15 reps  4. Prone: extension, rows : 2# x 20 reps each; Shoulder abduction 1# x 20 reps  5. Corner stretches for Pec Minor: 30 sec hold x 3 reps  6. Wall Slides - 3 mins  7. Wall Push-ups x 15 reps  8. 3-way Scapular Retraction - grey TB 15 reps each  9. Forearm wall slides x 10 reps  10. Standing shoulder IR/ER:  Green TB - 15 reps each - changed ER to Red-TB  10 x 2 - verbal and tactile cueing for better posture and technique to move scapua, not rotate trunk. - better technique noted.   11. Pulleys x 5 mins        HOMA received the following manual therapy techniques: Joint mobilizations were applied to the: right shoulder for 20 minutes. , including:  Glenohumeral grade II to III jt mobilization to all planes; STM-sub-scapularis release;  Scapular mobilization in all planes in sidelying; Lateral glides to bring scapula away from thorax and stretch pec minor on Right side. Posterior capsule stretch and sleeper stretch performed.  Increased time spent " with sleeper stretch to address restrictions with sidelying ER to follow.       HOMA participated in neuromuscular re-education activities to improve:  for  minutes, including:     HOMA received the following supervised modalities after being cleared for contradictions: Moist Heat x 15 mins to right shoulder      Home Exercises Provided and Patient Education Provided     Education provide Reminder d:   - progress towards goals   - role of therapy       Written Home Exercises Provided: corner stretches, wall slides and pulleys to be performed daily.  Exercises were reviewed and   HOMA was able to demonstrate them prior to the end of the session.   Pt received a written copy of exercises to perform at home.     HOMA demonstrated good  understanding of the education provided. However she is not stretching at home.     Assessment       HOMA is demonstrating improvement in her AROM today; much less pain with greater range during manual treatment and measurements. Encouraged her to keep up the progress; returns to MD on the 18th.      Pulled old chart to look at ROM measurements from previous visits back in March 2017 - AROM in Right shoulder at that time was Flexion 153; Extension 62; Abduction 130; IR - to mid thoracic spine; ER: HBH to C5  Homa is almost at these measurements, but has more pain now then she did a year ago.  Homa has not had any significant incidents occur since this time, she just has not been exercising her arm and now has developed adhesive capsulitis - unless there is some type of necrosis and fractures in the HH that were not seen on xray. Will continue to progress with ROM; aggressive manual Tx until her appointment with MD on the 18th of September.     Current ROM Measurement (9/7/18) - slight loss of active/passive range noted; Homa has not been as consistent with appointments or HEP.     ROM:   Shoulder  9/7/2018 Right    8/02/2018 Right   Pain/Dysfunction with Movement     AROM PROM MMT AROM  PROM MMT      flexion 160 165   4/5 155 157 4/5   End range pain   extension    45   55   4+/5 43 55 4+/5   No c/o    abduction  160 160 4-/5 148 148 4/5   End range pain   adduction   Able to reach to left post deltoid   28   4+/5  Able to reach to left posterior deltoid 28 4+/5   End range pain   Internal rotation Hand behind back to T8 88  4+/5  Hand behind back to T8 85 4+/5  No Pain   ER at 90° abd Hand behind head  78   4-/5 Hand behind head to C7 75 4-/5   Pain thru movement   ER at 0° abd 80 85   4/5 46 76 4/5   End range pain            Pt prognosis is Good.     Pt will continue to benefit from skilled outpatient physical therapy to address the deficits listed in the problem list box on initial evaluation, provide pt/family education and to maximize pt's level of independence in the home and community environment. She is making progress toward her goals, but has not achieved the 2 week goals established.  Skilled Physical Therapy intervention is still appropriate - current goals remain appropriate.     Anticipated barriers to physical therapy:     Pt's spiritual, cultural and educational needs considered and pt agreeable to plan of care and goals.      Short Term GOALS:  In 4 weeks, pt. will:  1. Subjective pain no more than 2/10 with daily activity - NOT MET  2. Able to demonstrate functional AROM in right shoulder thru all planes of motion - Improving, but still difficulty with range  3. Strength improved by 1/2 grade in flexion, abduction and ER - NOT MET  4. UEFS score improved to <20% limitation - NOT MET  Long Term GOALS:  In 8 weeks, pt. Will:  1. Report subjective pain 0/10 in right shoulder with daily use  2. Demonstrate 5/5 strength in Right shoulder in all planes  3. UEFS score with 0% limitation  4. be independent with HEP and SX management   Plan     Balaji will be seen 2x weeks for 3weeks (or until Skilled services are no longer required).   right shoulder manual therapy, there exercise and  modalities as needed for pain and.or inflammation.  Encouraged Balaji to really focus on her ROM and scapular exercises. She returns to Dr. Woods on September 18th.     Sofía Martines, PT

## 2018-09-13 ENCOUNTER — CLINICAL SUPPORT (OUTPATIENT)
Dept: REHABILITATION | Facility: HOSPITAL | Age: 68
End: 2018-09-13
Payer: MEDICARE

## 2018-09-13 DIAGNOSIS — M62.81 MUSCLE WEAKNESS OF RIGHT UPPER EXTREMITY: ICD-10-CM

## 2018-09-13 DIAGNOSIS — M25.611 DECREASED ROM OF RIGHT SHOULDER: ICD-10-CM

## 2018-09-13 DIAGNOSIS — M25.511 CHRONIC RIGHT SHOULDER PAIN: ICD-10-CM

## 2018-09-13 DIAGNOSIS — G89.29 CHRONIC RIGHT SHOULDER PAIN: ICD-10-CM

## 2018-09-13 PROCEDURE — 97110 THERAPEUTIC EXERCISES: CPT | Mod: PN

## 2018-09-13 PROCEDURE — 97140 MANUAL THERAPY 1/> REGIONS: CPT | Mod: PN

## 2018-09-13 NOTE — PROGRESS NOTES
Physical Therapy Daily Treatment Note     Name: Marleni Carpio [facility] Number: [id number]    Therapy Diagnosis:  Physician: Catrachito Woods MD    Physician Orders: PT Eval and Tx  Medical Diagnosis: s/p RTC Repair, Stiffness, scapular dyskinesia Right Shoulder   Evaluation Date: 18  Authorization period Expiration: 18  Plan of Care Certification Period: 2018    Visit #: 25 / Visits authorized: 36    Time In:  9:00  AM  Time Out:10:15 AM  Total Billable Time: 50 minutes    Precautions: Standard    Subjective     Pt reports: it's always sore, continue to do HEP with weights and pully's, scared that I may have to have it manipulated.   Response to previous treatment: Homa stated that her arm feels much better today, not as sore  Functional change: improved ROM    Pain:   4/10  Location: shoulder  right    Objective     HOMA received therapeutic exercises to develop strength and ROM for 25 minutes includin. UBE x 12 mins  2. Serratus Punches : 2# x 15 reps  3. Sidelying Ext Rotation: 1# x 15 reps  4. Prone: extension, rows : 2# x 20 reps each; Shoulder abduction 1# x 20 reps  5. Corner stretches for Pec Minor: 30 sec hold x 3 reps  6. Wall Slides - 3 mins  7. Wall Push-ups x 15 reps  8. 3-way Scapular Retraction - grey TB 15 reps each  9. Forearm wall slides x 10 reps  10. Standing shoulder IR/ER:  Green TB - 15 reps each - changed ER to Red-TB  10 x 2 - verbal and tactile cueing for better posture and technique to move scapua, not rotate trunk. - better technique noted.   11. Pulleys x 5 mins        HOMA received the following manual therapy techniques: Joint mobilizations were applied to the: right shoulder for 20 minutes. , including:  Glenohumeral grade II to III jt mobilization to all planes; STM-sub-scapularis release;  Scapular mobilization in all planes in sidelying; Lateral glides to bring scapula away from thorax and stretch pec minor on Right side.  Posterior capsule stretch and sleeper stretch performed.  Increased time spent with sleeper stretch to address restrictions with sidelying ER to follow.       HOMA participated in neuromuscular re-education activities to improve:  for  minutes, including:     HOMA received the following supervised modalities after being cleared for contradictions: Moist Heat x 15 mins to right shoulder      Home Exercises Provided and Patient Education Provided     Education provide Reminder d:   - progress towards goals   - role of therapy       Written Home Exercises Provided: corner stretches, wall slides and pulleys to be performed daily.  Exercises were reviewed and   HOMA was able to demonstrate them prior to the end of the session.   Pt received a written copy of exercises to perform at home.     HOMA demonstrated good  understanding of the education provided. However she is not stretching at home.     Assessment       HOMA is able to complete all established therex today without complications, however requires VC's to promote correct technique and to avoid compensating movements. Blocking scapula with passive ROM improve scapulo-humeral rhythm. Pain at posterior capsule, active release technique during cross arm stretch. Discomfort and all end range. Empty end feel during ER.          Current ROM Measurement (9/7/18) - slight loss of active/passive range noted; Homa has not been as consistent with appointments or HEP.     ROM:   Shoulder  9/7/2018 Right    8/02/2018 Right   Pain/Dysfunction with Movement     AROM PROM MMT AROM PROM MMT      flexion 160 165   4/5 155 157 4/5   End range pain   extension    45   55   4+/5 43 55 4+/5   No c/o    abduction  160 160 4-/5 148 148 4/5   End range pain   adduction   Able to reach to left post deltoid   28   4+/5  Able to reach to left posterior deltoid 28 4+/5   End range pain   Internal rotation Hand behind back to T8 88  4+/5  Hand behind back to T8 85 4+/5  No Pain   ER at 90°  abd Hand behind head  78   4-/5 Hand behind head to C7 75 4-/5   Pain thru movement   ER at 0° abd 80 85   4/5 46 76 4/5   End range pain            Pt prognosis is Good.     Pt will continue to benefit from skilled outpatient physical therapy to address the deficits listed in the problem list box on initial evaluation, provide pt/family education and to maximize pt's level of independence in the home and community environment. She is making progress toward her goals, but has not achieved the 2 week goals established.  Skilled Physical Therapy intervention is still appropriate - current goals remain appropriate.     Anticipated barriers to physical therapy:     Pt's spiritual, cultural and educational needs considered and pt agreeable to plan of care and goals.      Short Term GOALS:  In 4 weeks, pt. will:  1. Subjective pain no more than 2/10 with daily activity - NOT MET  2. Able to demonstrate functional AROM in right shoulder thru all planes of motion - Improving, but still difficulty with range  3. Strength improved by 1/2 grade in flexion, abduction and ER - NOT MET  4. UEFS score improved to <20% limitation - NOT MET  Long Term GOALS:  In 8 weeks, pt. Will:  1. Report subjective pain 0/10 in right shoulder with daily use  2. Demonstrate 5/5 strength in Right shoulder in all planes  3. UEFS score with 0% limitation  4. be independent with HEP and SX management   Plan     Balaji will be seen 2x weeks for 3weeks (or until Skilled services are no longer required).   right shoulder manual therapy, there exercise and modalities as needed for pain and.or inflammation.  Encouraged Balaji to really focus on her ROM and scapular exercises. She returns to Dr. Woods on September 18th.     Domingo Ceballos, PTA

## 2018-09-17 ENCOUNTER — CLINICAL SUPPORT (OUTPATIENT)
Dept: REHABILITATION | Facility: HOSPITAL | Age: 68
End: 2018-09-17
Payer: MEDICARE

## 2018-09-17 DIAGNOSIS — M25.611 DECREASED ROM OF RIGHT SHOULDER: Primary | ICD-10-CM

## 2018-09-17 DIAGNOSIS — M62.81 MUSCLE WEAKNESS OF RIGHT UPPER EXTREMITY: ICD-10-CM

## 2018-09-17 DIAGNOSIS — M25.511 CHRONIC RIGHT SHOULDER PAIN: ICD-10-CM

## 2018-09-17 DIAGNOSIS — G89.29 CHRONIC RIGHT SHOULDER PAIN: ICD-10-CM

## 2018-09-17 PROCEDURE — 97140 MANUAL THERAPY 1/> REGIONS: CPT | Mod: PN

## 2018-09-17 PROCEDURE — 97110 THERAPEUTIC EXERCISES: CPT | Mod: PN

## 2018-09-17 NOTE — PROGRESS NOTES
"                   Physical Therapy Daily Treatment Note/ MD Progress Report     Name: Marleni Sousa  Clinic Number: [id number]    Therapy Diagnosis:  Physician: Catrachito Woods MD    Physician Orders: PT Eval and Tx  Medical Diagnosis: s/p RTC Repair, Stiffness, scapular dyskinesia Right Shoulder   Evaluation Date: 18  Authorization period Expiration: 18  Plan of Care Certification Period: 2018    Visit #: 25 / Visits authorized: 36    Time In:  9:00  AM  Time Out:10:15 AM  Total Billable Time: 50 minutes    Precautions: Standard    Subjective     Pt reports: I'm going to see Dr. Woods in the morning; "what do you think he will say?"   Response to previous treatment: Homa stated that her arm feels much better today, not as sore  Functional change: improved ROM    Pain:   4/10  Location: shoulder  right    Objective     HOMA received therapeutic exercises to develop strength and ROM for 25 minutes includin. UBE x 12 mins  2. Serratus Punches : 2# x 15 reps  3. Sidelying Ext Rotation: 1# x 15 reps  4. Prone: extension, rows : 2# x 20 reps each; Shoulder abduction 1# x 20 reps  5. Corner stretches for Pec Minor: 30 sec hold x 3 reps  6. Wall Slides - 3 mins  7. Wall Push-ups x 15 reps  8. 3-way Scapular Retraction - grey TB 15 reps each  9. Forearm wall slides x 10 reps  10. Standing shoulder IR/ER:  Green TB - 15 reps each - changed ER to Red-TB  10 x 2 - verbal and tactile cueing for better posture and technique to move scapua, not rotate trunk. - better technique noted.   11. Pulleys x 5 mins        HOMA received the following manual therapy techniques: Joint mobilizations were applied to the: right shoulder for 20 minutes. , including:  Glenohumeral grade II to III jt mobilization to all planes; STM-sub-scapularis release;  Scapular mobilization in all planes in sidelying; Lateral glides to bring scapula away from thorax and stretch pec minor on Right side. Posterior capsule stretch " and sleeper stretch performed.  Increased time spent with sleeper stretch to address restrictions with sidelying ER to follow.       HOMA participated in neuromuscular re-education activities to improve:  for  minutes, including:     HOMA received the following supervised modalities after being cleared for contradictions: Moist Heat x 15 mins to right shoulder      Home Exercises Provided and Patient Education Provided     Education provide Reminder d:   - progress towards goals   - role of therapy       Written Home Exercises Provided: corner stretches, wall slides and pulleys to be performed daily.  Exercises were reviewed and   HOMA was able to demonstrate them prior to the end of the session.   Pt received a written copy of exercises to perform at home.     HOMA demonstrated good  understanding of the education provided. However she is not stretching at home.     Assessment       HOMA's active ROM is not quite as good as it was 2 weeks ago. Her AROM fluctuates from week to week depended on whether or not she is consistent with her visits and/or HEP and aggressiveness of stretching.  She continues to have pain at end range of flexion/abduction and external rotation.  She is able to reach behind her back with fairly good function, but can only get her hand to the back of her head on a consistent basis.  Weakness continues in her scapular muscles with some compensation noted during elevation of arm.     Current ROM Measurement (9/17/18).  Improvement noted from last progress report to MD for visit on 7/5/2018; but still with limitations and end range pain.      ROM:   Shoulder  7/5/2018 Right    9/17/2018 Right   Pain/Dysfunction with Movement     AROM PROM MMT AROM PROM MMT      flexion 148 148   4/5 155 157 4/5   End range pain   extension 43   55   4/5 43 55 4/5   No c/o    abduction  140 160 4-/5 148 148 4-/5   End range pain   adduction   Able to reach to left post deltoid   28   4+/5  Able to reach to left  posterior deltoid 28 4+/5   End range pain   Internal rotation Hand behind back to T8 85  4+/5  Hand behind back to T8 85 4+/5  No Pain   ER at 90° abd Hand behind head 65   4-/5 Hand behind head to C6 75 4-/5   Pain thru movement   ER at 0° abd 46 76   4/5 46 76 4-/5   End range pain            Pt prognosis is Good.     Pt will continue to benefit from skilled outpatient physical therapy to address the deficits listed in the problem list box on initial evaluation, provide pt/family education and to maximize pt's level of independence in the home and community environment. She is making progress toward her goals, but slowly. Cannot get a consistent improvement in range or decreasing pain; strength remains limited as well.   Skilled Physical Therapy intervention is still appropriate - current goals remain appropriate.     Anticipated barriers to physical therapy:     Pt's spiritual, cultural and educational needs considered and pt agreeable to plan of care and goals.      Short Term GOALS:  In 4 weeks, pt. will:  1. Subjective pain no more than 2/10 with daily activity - NOT MET  2. Able to demonstrate functional AROM in right shoulder thru all planes of motion - Improving, but still difficulty with range  3. Strength improved by 1/2 grade in flexion, abduction and ER - NOT MET  4. UEFS score improved to <20% limitation - NOT MET  Long Term GOALS:  In 8 weeks, pt. Will:  1. Report subjective pain 0/10 in right shoulder with daily use  2. Demonstrate 5/5 strength in Right shoulder in all planes  3. UEFS score with 0% limitation  4. be independent with HEP and SX management   Plan     Balaji returns to MD tomorrow; she has another appointment scheduled for this week in therapy.  Balaji will call and let me know what Dr. Woods wants to do in terms of possible manipulation or something else.     Sofía Martines, PT

## 2018-09-18 NOTE — PROGRESS NOTES
9/18/2018    Balaji called today following her MD appointment.  Dr. Woods wants her to have some xrays/MRI of her neck and shoulder.  She is to hold off on therapy for now until results of tests and follow-up appointment with him.  She will call when ready to return.

## 2019-02-28 ENCOUNTER — CLINICAL SUPPORT (OUTPATIENT)
Dept: REHABILITATION | Facility: HOSPITAL | Age: 69
End: 2019-02-28
Payer: MEDICARE

## 2019-02-28 DIAGNOSIS — M25.511 CHRONIC RIGHT SHOULDER PAIN: Primary | ICD-10-CM

## 2019-02-28 DIAGNOSIS — M25.519 SHOULDER PAIN: Primary | ICD-10-CM

## 2019-02-28 DIAGNOSIS — M89.8X1 CHRONIC SCAPULAR PAIN: ICD-10-CM

## 2019-02-28 DIAGNOSIS — G89.29 CHRONIC SCAPULAR PAIN: ICD-10-CM

## 2019-02-28 DIAGNOSIS — M62.81 MUSCLE WEAKNESS OF RIGHT UPPER EXTREMITY: ICD-10-CM

## 2019-02-28 DIAGNOSIS — G89.29 CHRONIC RIGHT SHOULDER PAIN: Primary | ICD-10-CM

## 2019-02-28 DIAGNOSIS — M25.611 DECREASED ROM OF RIGHT SHOULDER: ICD-10-CM

## 2019-02-28 PROCEDURE — 97110 THERAPEUTIC EXERCISES: CPT | Mod: PN

## 2019-02-28 PROCEDURE — 97162 PT EVAL MOD COMPLEX 30 MIN: CPT | Mod: PN

## 2019-02-28 PROCEDURE — 97140 MANUAL THERAPY 1/> REGIONS: CPT | Mod: PN

## 2019-02-28 NOTE — PROGRESS NOTES
[facility]  Physical Therapy Initial Evaluation    Name: Marleni Sousa  Clinic Number: [id number]    Therapy Diagnosis:   Encounter Diagnoses   Name Primary?    Chronic right shoulder pain Yes    Decreased ROM of right shoulder     Muscle weakness of right upper extremity      Physician: Catrachito Woods MD    Physician Orders: PT Eval and Treat R shoulder with precautions  Medical Diagnosis: R TSA and RCR  Evaluation Date: 2/28/2019  Authorization period Expiration: 5/28/19  Plan of Care Certification Period: 5/28/19     Visit #: 1/ Visits authorized: 12  Time In:8:10 am  Time Out: 8:55 am  Total Billable Time: 45 minutes    Precautions: orthopedic precautions    Subjective   Date of onset: chronic  Date of Surgery: 1/17/19    No past medical history on file.  Marleni Sousa  has no past surgical history on file.    Marleni currently has no medications in their medication list.    Review of patient's allergies indicates:  Allergies not on file     Imaging, bone scan films: healing as expected; US on RC healing as expected    Prior Therapy: prior to this surgery, not successful  Occupation: na  Prior Level of Function: limited with ROM of RUE since fracture of glenoid 3 years ago  Current Level of Function: unable to lift arm    Pain:  Current 4/10, worst 6/10, best 4/10   Location: shoulder and forearm  right  Description: Aching  Aggravating Factors: trying to move it  Easing Factors: rest and icy hot      Onset/JAIR: gradual    History of current condition - HOMA reports: long term history of symptoms, including pain in right arm and loss of motion. Patient fell 3 years ago, sustaining glenoid fracture for which she had to have surgery. Patient did not recover well from glenoid and RC surgery and now has undergone RC patch with TSA.    Pts goals: to restore use of right arm        Objective     Observation: Pt arrives without sling    Posture:     -       Affected scapula  elevated  -       Affected scapula abducted  -       Affected scapula upwardly rotated    Shoulder Range of Motion:   Left active Right active  Right Passive   Flexion WFL 20 131   Abduction WFL 20 93   Extension WFL 30  33   Ext. Rotation WFL Hand to right eyebrow 59   Int. Rotation WFL Hand to right hip 15         Upper Extremity Strength  (R) UE  (L) UE    Shoulder flexion: 2/5 Shoulder flexion: 4/5   Shoulder Abduction: 2-/5 Shoulder abduction: 4/5   Shoulder ER 3-/5 Shoulder ER 4/5   Shoulder IR 3-/5 Shoulder IR 4/5   Lower Trap 2/5 Lower Trap 4/5   Middle Trap 3-/5 Middle Trap 4/5   Rhomboids 3-/5 Rhomboids 4/5     Special Tests:  Deferred due to recent surgery    Joint mobility: restricted glenohumeral joint all planes, restricted scapulothoracic all planes    Palpation:moderate tenderness to palpation at anterior shoulder capsule, minimal TTP at posterior shoulder capsule    Sensation: intact    Flexibility:     Upper Trap = R significant restriction, L significant restriction   Scalenes: R moderate restriction, L moderate restriction   SCM: R moderate restriction, L moderate restriction   Levator Scap: R significant restriction, L moderate restriction    Scapular Control/Dyskinesis:    Normal / Subtle / Obvious  Comments    Left  subtle    Right  Obvious      Severe scapulothoracic restrictions and muscle weakness affecting scapulohumeral rhythm.    PT Evaluation Completed? Yes  Discussed Plan of Care with patient: Yes    TREATMENT   HOMA received therapeutic exercises to develop strength and ROM for 15 minutes including:  PROM to R shoulder all planes, AAROM R shoulder in gravity reduced positions for flexion and ER    HOMA received the following manual therapy techniques: Joint mobilizations and Soft tissue Mobilization for 15  minutes.   Gr II GHJ glides all planes, scapulothoracic mobs all planes, STM periscapular muscles with increased focus on medial and superior border for improved scapular mobility,  [facility]     Home Exercises and Patient Education Provided    Education provided re:   - progress towards goals   - role of therapy in multi - disciplinary team, goals for therapy  Pt educated on condition, POC, and expectations in therapy.  No spiritual or educational barriers to learning provided    Home exercises:  Pt to continue performing exercises given by MD  Pt will be provided HEP during course of treatment with progressions as appropriate. Pt was advised to perform these exercises free of pain, and to stop performing them if pain occurs.   HOMA demonstrated good  understanding of the education provided.       Functional Limitations Reports - G Codes  Category: Mobility, Body position, Carrying, Self care, Other  Tool: UEFS  Score: 60-80% limitation  Current:CL = least 60% but < 80% impaired, limited or restricted  Goal:CJ = at least 20% but < 40% impaired, limited or restricted      Assessment   Marleni is a 68 y.o. female referred to outpatient physical therapy and presents to PT with severe muscle weakness in RUE, periscapular weakness affecting pt's functional reach and ability to use R UE. Pt with severe compensation with attempt at elevating R arm due to weakness and impaired scapulohumeral rhythm. Patient demonstrates limitations as described in the problem list. Pt will benefit from physcial therapy services in order to maximize pain free and/or functional use of right UE. The following goals were discussed with the patient and patient is in agreement with them as to be addressed in the treatment plan.   Pt prognosis is Good.   Pt will benefit from skilled outpatient Physical Therapy to address the deficits stated above and in the chart below, provide pt/family education, and to maximize pt's level of independence.     Plan of care discussed with patient: Yes  Pt's spiritual, cultural and educational needs considered and pt agreeable to plan of care and goals as stated below:     Anticipated Barriers for  therapy: long term weakness, multiple surgeries on RC    Medical necessity is demonstrated by the following IMPAIRMENTS/PROBLEM LIST:    weakness, impaired self care skills, decreased upper extremity function, pain, abnormal tone, decreased ROM, impaired fine motor, impaired joint extensibility, impaired muscle length and orthopedic precautions    GOALS:     Long Term Goals: 12 weeks  Pain: Decrease pain to 0/10 80% of the time and with R shoulder mobility to allow for improved functional use of RUE  Strength: Improve strength in right shoulder and periscapular muscles to 4/5 for improved shoulder stability  ROM: Improve ROM to 75% of normal limits in R shoulder, all planes  Functional scale: Improve score on UEFS to 1-20% limitation  Lifting: Lift 8 lbs to waist level, 3 lbs to shoulder level, 1 lbs to overhead without pain or compensation  Postures: Report ability to work on pottery and painting without increased pain or compensation  Exercise: demonstrate independence with home exercise program to maintain gains made in therapy.          Plan   Certification Period: 2/28/2019 to 5/28/19.    Outpatient physical therapy 1 times weekly to include: Electrical Stimulation NMES to periscapular muscles, Manual Therapy, Moist Heat/ Ice, Neuromuscular Re-ed, Patient Education, Self Care, Therapeutic Activites and Therapeutic Exercise. Cont PT for 3 months.   Pt may be seen by PTA as part of the rehabilitation team.   Patient to be seen by PT for 1 visit per week, will continue aquatic therapy as part of HEP as patient demonstrates excellent understanding of exercises to perform in the pool. PT will be available as needed to discuss progression when needed.      I certify the need for these services furnished under this plan of treatment and while under my care.    Berta Houston, PT

## 2019-03-04 ENCOUNTER — CLINICAL SUPPORT (OUTPATIENT)
Dept: REHABILITATION | Facility: HOSPITAL | Age: 69
End: 2019-03-04
Payer: MEDICARE

## 2019-03-04 DIAGNOSIS — M62.81 MUSCLE WEAKNESS OF RIGHT UPPER EXTREMITY: ICD-10-CM

## 2019-03-04 DIAGNOSIS — M25.611 DECREASED ROM OF RIGHT SHOULDER: Primary | ICD-10-CM

## 2019-03-04 PROCEDURE — 97140 MANUAL THERAPY 1/> REGIONS: CPT

## 2019-03-04 PROCEDURE — 97110 THERAPEUTIC EXERCISES: CPT

## 2019-03-04 NOTE — PROGRESS NOTES
Physical Therapy Daily Note     Name: Marleni Sousa  Clinic Number: [id number]  Diagnosis:   Encounter Diagnoses   Name Primary?    Decreased ROM of right shoulder Yes    Muscle weakness of right upper extremity      Physician: Catrachito Woods MD  Precautions: orthopedic precautions per MD referral  Visit #: 2 of 12  PTA Visit #: 0  Time In: 1110 am  Time Out: 1210 am    Subjective     Pt reports: She's been working on her exercises at home, but says IR with the band is causing her pain. Pt says she thinks she can get her arm up higher and feels like she is progressing much better than she did with her first surgery.  Pain Scale: Marleni rates pain on a scale of 0-10 to be 5 currently.    Objective     Began with MHP to R shoulder for 10 mins    Marleni received individual therapeutic exercises to develop strength, ROM and posture for 20 minutes including:    Date 3/4/19       Exercise Sets x Reps Resistance    Sets x Reps Resistance    Sets x Reps Resistance    Sets x Reps Resistance    Sets x Reps Resistance      Supine flexion (R) 3x5 with PT       sidelying ER 3x5 with PT       sidelying abduction 3x5 with PT       sidelying flexion 2x10 with PT       sidelying scap retractions 2x10 with PT       PROM all planes 2x10 ea                   Marleni received the following manual therapy techniques: Joint mobilizations, Myofacial release and Soft tissue Mobilization were applied to the: glenohumeral joint and surrounding musculature for 25 minutes including:  Gr II-III GHJ glides all planes  STM to pec major  Myofascial release and TrP release to trapezius with biofreeze  Scapular mobs and distraction on R       Home Exercises Provided: sidelying shoulder flexion, hold wall slides due to excessive compensation, substituted resisted IR for isometrics    Pt demo good understanding of the education provided. Marleni demonstrated good return demonstration of  activities.     Education provided re:  Marleni verbalized good understanding of education provided.   No spiritual or educational barriers to learning provided    Assessment     Patient tolerated treatment well. Pt completing treatment without pain. Pt completing treatment without complaint.    ROM kept in pain free range. Pt demonstrating severe muscle weakness resulting in scapular dyskinesia, impaired scapulohumeral rhythm and severe compensation of shoulder hike with attempted elevation. Supraspinatus isolation reveals 0/5 muscle strength. Pt may benefit from NMES to suprasinatus to improve shoulder function.       Plan     Continue with established Plan of Care towards PT goals.    Therapist: Berta Houston, PT  3/4/2019

## 2019-03-15 ENCOUNTER — CLINICAL SUPPORT (OUTPATIENT)
Dept: REHABILITATION | Facility: HOSPITAL | Age: 69
End: 2019-03-15
Payer: MEDICARE

## 2019-03-15 DIAGNOSIS — M62.81 MUSCLE WEAKNESS OF RIGHT UPPER EXTREMITY: ICD-10-CM

## 2019-03-15 DIAGNOSIS — M25.611 DECREASED ROM OF RIGHT SHOULDER: ICD-10-CM

## 2019-03-15 DIAGNOSIS — G89.29 CHRONIC RIGHT SHOULDER PAIN: ICD-10-CM

## 2019-03-15 DIAGNOSIS — M25.511 CHRONIC RIGHT SHOULDER PAIN: ICD-10-CM

## 2019-03-15 PROCEDURE — 97110 THERAPEUTIC EXERCISES: CPT

## 2019-03-15 PROCEDURE — 97140 MANUAL THERAPY 1/> REGIONS: CPT

## 2019-03-15 NOTE — PROGRESS NOTES
Physical Therapy Daily Note     Name: Marleni Sousa  [facility] Number: [id number]  Diagnosis:   No diagnosis found.  Physician: Catrachito Woods MD  Precautions: orthopedic precautions per MD referral  Visit #: 3 of 12  PTA Visit #: 1  Time In: 910am  Time Out: 1010 am    Subjective     Pt reports: compliant with HEP, general soreness and pain in shoulder and scapula, no new complaints.   Pain Scale: Marleni rates pain on a scale of 0-10 to be 5 currently.    Objective     Began with MHP to R shoulder for 10 mins    Marleni received individual therapeutic exercises to develop strength, ROM and posture for 20 minutes including:    Date 3/4/19 3/15/19      Exercise Sets x Reps Resistance    Sets x Reps Resistance    Sets x Reps Resistance    Sets x Reps Resistance    Sets x Reps Resistance      Supine flexion (R) 3x5 with PT 2 x 10 with PTA      sidelying ER 3x5 with PT 2x10 with PTA      sidelying abduction 3x5 with PT 2x10 with PTA      sidelying flexion 2x10 with PT 2x10 with PTA      sidelying scap retractions 2x10 with PT 2x10 with PTA      PROM all planes 2x10 ea 2x10 ea                  Marleni received the following manual therapy techniques: Joint mobilizations, Myofacial release and Soft tissue Mobilization were applied to the: glenohumeral joint and surrounding musculature for 25 minutes including:  Gr II-III GHJ glides all planes  STM to pec major  Myofascial release and TrP release to trapezius with biofreeze  Scapular mobs and distraction on R       Home Exercises Provided: sidelying shoulder flexion, hold wall slides due to excessive compensation, substituted resisted IR for isometrics    Pt demo good understanding of the education provided. Marleni demonstrated good return demonstration of activities.     Education provided re:  Marleni verbalized good understanding of education provided.   No spiritual or educational barriers to learning  provided    Assessment     Patient tolerated treatment well. Pt completing treatment without pain. Pt completing treatment without complaint.    ROM kept in pain free range. Pt demonstrating severe muscle weakness resulting in scapular dyskinesia, impaired scapulohumeral rhythm and severe compensation of shoulder hike with attempted elevation. Scapular musculature presents with medial border restrictions with increased discomfort during TPR. biofreeze requested by pt and applied before departure.       Plan     Continue with established Plan of Care towards PT goals.    Therapist: Domingo Ceballos PTA  3/15/2019

## 2019-03-18 ENCOUNTER — CLINICAL SUPPORT (OUTPATIENT)
Dept: REHABILITATION | Facility: HOSPITAL | Age: 69
End: 2019-03-18
Payer: MEDICARE

## 2019-03-18 DIAGNOSIS — M25.611 DECREASED ROM OF RIGHT SHOULDER: Primary | ICD-10-CM

## 2019-03-18 DIAGNOSIS — M25.511 CHRONIC RIGHT SHOULDER PAIN: ICD-10-CM

## 2019-03-18 DIAGNOSIS — G89.29 CHRONIC RIGHT SHOULDER PAIN: ICD-10-CM

## 2019-03-18 PROCEDURE — 97032 APPL MODALITY 1+ESTIM EA 15: CPT

## 2019-03-18 PROCEDURE — 97140 MANUAL THERAPY 1/> REGIONS: CPT

## 2019-03-18 PROCEDURE — 97110 THERAPEUTIC EXERCISES: CPT

## 2019-03-18 NOTE — PROGRESS NOTES
Physical Therapy Daily Note     Name: Marleni Sousa  Clinic Number: [id number]  Diagnosis:   Encounter Diagnoses   Name Primary?    Decreased ROM of right shoulder Yes    Chronic right shoulder pain      Physician: Catrachito Woods MD  Precautions: orthopedic precautions per MD referral  Visit #: 4 of 12  PTA Visit #: 0  Time In: 10:10 am  Time Out: 11:10 am    Subjective     Pt reports: continued soreness throughout anterior shoulder and into forearm. States she does her exercises daily, without fail.  Pain Scale: Marleni rates pain on a scale of 0-10 to be 5 currently.    Objective     Began with MHP to R shoulder with NMES to posterior deltoid and lower trap 10/10 on/off for 10 mins, cuing to squeeze scapula when on.    Marleni received individual therapeutic exercises to develop strength, ROM and posture for 30 minutes including:    Date 3/4/19 3/15/19 3/18/19     Exercise Sets x Reps Resistance    Sets x Reps Resistance    Sets x Reps Resistance    Sets x Reps Resistance    Sets x Reps Resistance      Supine flexion (R) 3x5 with PT 2 x 10 with PTA 2x10 with PT     sidelying ER 3x5 with PT 2x10 with PTA 2x10 w PT     sidelying abduction 3x5 with PT 2x10 with PTA 2x10 w PT     sidelying flexion 2x10 with PT 2x10 with PTA 3X10 w PT     sidelying scap retractions 2x10 with PT 2x10 with PTA 10 min w NMES     PROM all planes 2x10 ea 2x10 ea 2x10 ea                 Marleni received the following manual therapy techniques: Joint mobilizations, Myofacial release and Soft tissue Mobilization were applied to the: glenohumeral joint and surrounding musculature for 10 minutes including:  Gr II-III GHJ glides all planes  STM to pec major with biofreeze  Myofascial release and TrP release to trapezius with biofreeze  Scapular mobs and distraction on R       Home Exercises Provided: sidelying shoulder flexion, hold wall slides due to excessive compensation, substituted  resisted IR for isometrics    Pt demo good understanding of the education provided. Marleni demonstrated good return demonstration of activities.     Education provided re:  Marleni verbalized good understanding of education provided.   No spiritual or educational barriers to learning provided    Assessment     Patient tolerated treatment well. Pt completing treatment without pain. Pt completing treatment without complaint.    ROM kept in pain free range. Pt demonstrating severe muscle weakness resulting in scapular dyskinesia, impaired scapulohumeral rhythm and severe compensation of shoulder hike with attempted elevation. Pt with continued muscle weakness and impaired scapular mobility affecting ability to elevate arm without compensation. Biofreeze requested by pt and applied before departure.       Plan     Continue with established Plan of Care towards PT goals.    Therapist: Berta Houston, PT  3/18/2019

## 2019-03-25 ENCOUNTER — CLINICAL SUPPORT (OUTPATIENT)
Dept: REHABILITATION | Facility: HOSPITAL | Age: 69
End: 2019-03-25
Payer: MEDICARE

## 2019-03-25 DIAGNOSIS — M62.81 MUSCLE WEAKNESS OF RIGHT UPPER EXTREMITY: ICD-10-CM

## 2019-03-25 DIAGNOSIS — M25.611 DECREASED ROM OF RIGHT SHOULDER: Primary | ICD-10-CM

## 2019-03-25 DIAGNOSIS — G89.29 CHRONIC RIGHT SHOULDER PAIN: ICD-10-CM

## 2019-03-25 DIAGNOSIS — M25.511 CHRONIC RIGHT SHOULDER PAIN: ICD-10-CM

## 2019-03-25 PROCEDURE — 97110 THERAPEUTIC EXERCISES: CPT

## 2019-03-25 PROCEDURE — 97140 MANUAL THERAPY 1/> REGIONS: CPT

## 2019-03-25 PROCEDURE — 97014 ELECTRIC STIMULATION THERAPY: CPT

## 2019-03-25 NOTE — PROGRESS NOTES
"                                                    Physical Therapy Daily Note     Name: Marleni Carpio Lars  Clinic Number: [id number]  Diagnosis:   Encounter Diagnoses   Name Primary?    Decreased ROM of right shoulder Yes    Chronic right shoulder pain     Muscle weakness of right upper extremity      Physician: Catrachito Woods MD  Precautions: orthopedic precautions per MD referral  Visit #: 5 of 12  PTA Visit #: 0  Time In: 10:10 am  Time Out: 11:15 am    Subjective     Pt reports: concern that she may have reinjured herself. She denies doing anything that would have resulted in re-injury, just worried that she's not progressing the way she thought she would.   Pain Scale: Marleni rates pain on a scale of 0-10 to be 5 currently.    Objective     Began with MHP to R shoulder with NMES to posterior deltoid and lower trap 10/10 on/off for 10 mins, cuing to squeeze scapula when on.    Marleni received individual therapeutic exercises to develop strength, ROM and posture for 15 minutes, there-ex with supervision of one other patient for 15 mins including:    Date 3/4/19 3/15/19 3/18/19 3/25/19    Exercise Sets x Reps Resistance    Sets x Reps Resistance    Sets x Reps Resistance    Sets x Reps Resistance    Sets x Reps Resistance      Supine flexion (R) 3x5 with PT 2 x 10 with PTA 2x10 with PT 2x10 with PT  2x10 w kina    sidelying ER 3x5 with PT 2x10 with PTA 2x10 w PT 2x10 with PT  2x10 Ind    sidelying abduction 3x5 with PT 2x10 with PTA 2x10 w PT 2x10 w PT    sidelying flexion 2x10 with PT 2x10 with PTA 3X10 w PT 3x15 w cuing to avoid substitution    sidelying scap retractions 2x10 with PT 2x10 with PTA 10 min w NMES 10 min w NMES    PROM all planes 2x10 ea 2x10 ea 2x10 ea 2x10 ea    Pulley PROM    x20    iso deltoid    10" hold/ 3 min with PT                        Marleni received the following manual therapy techniques: Joint mobilizations, Myofacial release and Soft tissue Mobilization were applied to the: " glenohumeral joint and surrounding musculature for 15 minutes including:  Gr II-III GHJ glides all planes  Scapular mobs and distraction on R   Rev stabilization R shoulder in flexion @70 deg, ER @ 45 and 70 deg, IR @45 deg        Home Exercises Provided: sidelying shoulder flexion, small range pulleys in front of mirror (PROM only)    Pt demo good understanding of the education provided. Marleni demonstrated good return demonstration of activities.     Education provided re:  Marleni verbalized good understanding of education provided.   No spiritual or educational barriers to learning provided    Assessment     Patient tolerated treatment well. Pt completing treatment without pain. Pt completing treatment without complaint.    ROM kept in pain free range. Pt demonstrating severe muscle weakness resulting in scapular dyskinesia, impaired scapulohumeral rhythm and severe compensation of shoulder hike with attempted elevation. Pt demonstrating continued clinical weakness with inability to hold arm against gravity. Pt with better AROM in gravity reduced plane, able to actively elevate arm to 97 deg in sidelying. Patient with very slow progress and continued difficulty avoiding shoulder hike with active movements.     Plan     Continue with established Plan of Care towards PT goals.    Therapist: Berta Houston, PT  3/25/2019

## 2019-04-01 ENCOUNTER — CLINICAL SUPPORT (OUTPATIENT)
Dept: REHABILITATION | Facility: HOSPITAL | Age: 69
End: 2019-04-01
Payer: MEDICARE

## 2019-04-01 DIAGNOSIS — M25.511 CHRONIC RIGHT SHOULDER PAIN: ICD-10-CM

## 2019-04-01 DIAGNOSIS — G89.29 CHRONIC RIGHT SHOULDER PAIN: ICD-10-CM

## 2019-04-01 DIAGNOSIS — M25.611 DECREASED ROM OF RIGHT SHOULDER: ICD-10-CM

## 2019-04-01 DIAGNOSIS — M62.81 MUSCLE WEAKNESS OF RIGHT UPPER EXTREMITY: Primary | ICD-10-CM

## 2019-04-01 PROCEDURE — 97110 THERAPEUTIC EXERCISES: CPT

## 2019-04-01 PROCEDURE — 97140 MANUAL THERAPY 1/> REGIONS: CPT

## 2019-04-01 NOTE — PROGRESS NOTES
"                                                    Physical Therapy Daily Note     Name: Marleni Carpio [facility]  Clinic Number: [id number]  Diagnosis:   Encounter Diagnoses   Name Primary?    Muscle weakness of right upper extremity Yes    Decreased ROM of right shoulder     Chronic right shoulder pain      Physician: Catrachito Woods MD  Precautions: orthopedic precautions per MD referral  Visit #: 6 of 12  PTA Visit #: 0  Time In: 10:15 am  Time Out: 11:05 am    Subjective     Pt reports: crackling and grinding in her shoulder with movement. Denies pain with such cracking/grinding.   Pain Scale: Marleni rates pain on a scale of 0-10 to be 5 currently.    Objective     Began with NMES to posterior deltoid and lower trap 10/10 on/off for 10 mins, cuing to squeeze scapula when on.    Marleni received individual therapeutic exercises to develop strength, ROM and posture for 30 minutes,  including:    Date 3/4/19 3/15/19 3/18/19 3/25/19 4/1/19   Exercise Sets x Reps Resistance    Sets x Reps Resistance    Sets x Reps Resistance    Sets x Reps Resistance    Sets x Reps Resistance      Supine flexion (R) 3x5 with PT 2 x 10 with PTA 2x10 with PT 2x10 with PT  2x10 w kina 2x10 with PT  2x10 w kina   sidelying ER 3x5 with PT 2x10 with PTA 2x10 w PT 2x10 with PT  2x10 Ind 2x10 with PT  2x10 Ind   sidelying abduction 3x5 with PT 2x10 with PTA 2x10 w PT 2x10 w PT 2x10 w PT   sidelying flexion 2x10 with PT 2x10 with PTA 3X10 w PT 3x15 w cuing to avoid substitution 3x 15   sidelying scap retractions 2x10 with PT 2x10 with PTA 10 min w NMES 10 min w NMES 10 min w NMES   PROM all planes 2x10 ea 2x10 ea 2x10 ea 2x10 ea 3x10 all planes   Pulley PROM    x20    iso deltoid    10" hold/ 3 min with PT 10" hold/ 3 min w PT                       Marleni received the following manual therapy techniques: Joint mobilizations, Myofacial release and Soft tissue Mobilization were applied to the: glenohumeral joint and surrounding musculature for " 15 minutes including:  Gr II-III GHJ glides all planes  Scapular mobs and distraction on R   Rev stabilization R shoulder in flexion @70 deg, ER @ 45 and 70 deg, IR @45 deg      Home Exercises Provided: sidelying shoulder flexion, small range pulleys in front of mirror (PROM only)    Pt demo good understanding of the education provided. Marleni demonstrated good return demonstration of activities.     Education provided re:  Marleni verbalized good understanding of education provided.   No spiritual or educational barriers to learning provided    Assessment     Patient tolerated treatment well. Pt completing treatment without pain. Pt completing treatment without complaint.    ROM kept in pain free range. Pt demonstrating severe muscle weakness resulting in scapular dyskinesia, impaired scapulohumeral rhythm and severe compensation of shoulder hike with attempted elevation. Pt demonstrating continued clinical weakness with inability to hold arm against gravity. Patient demonstrating more independence with AAROM exercises in gravity reduced positions. Continued compensation/substitution against gravity even with AAROM exercises. Patient habitually late for appointments which impact treatment progression.     Plan     Continue with established Plan of Care towards PT goals.    Therapist: Berta Houston, PT  4/1/2019

## 2019-04-08 ENCOUNTER — CLINICAL SUPPORT (OUTPATIENT)
Dept: REHABILITATION | Facility: HOSPITAL | Age: 69
End: 2019-04-08
Payer: MEDICARE

## 2019-04-08 DIAGNOSIS — M62.81 MUSCLE WEAKNESS OF RIGHT UPPER EXTREMITY: Primary | ICD-10-CM

## 2019-04-08 DIAGNOSIS — G89.29 CHRONIC RIGHT SHOULDER PAIN: ICD-10-CM

## 2019-04-08 DIAGNOSIS — M25.611 DECREASED ROM OF RIGHT SHOULDER: ICD-10-CM

## 2019-04-08 DIAGNOSIS — M25.511 CHRONIC RIGHT SHOULDER PAIN: ICD-10-CM

## 2019-04-08 PROCEDURE — 97110 THERAPEUTIC EXERCISES: CPT

## 2019-04-08 PROCEDURE — 97140 MANUAL THERAPY 1/> REGIONS: CPT

## 2019-04-08 NOTE — PROGRESS NOTES
Physical Therapy Daily Note     Name: Marleni Sousa  Clinic Number: [id number]  Diagnosis:   Encounter Diagnoses   Name Primary?    Muscle weakness of right upper extremity Yes    Decreased ROM of right shoulder     Chronic right shoulder pain      Physician: Catrachito Woods MD  Precautions: orthopedic precautions per MD referral  Visit #: 7 of 12  PTA Visit #: 0  Time In: 9:15 am  Time Out: 10:00 am    Subjective     Pt reports: continued difficulty lifting arm. She's worried she did something to mess it up, but does not report any action that would have injured her repair.    Pain Scale: Marleni rates pain on a scale of 0-10 to be 5 currently.    Objective     Began with NMES to posterior deltoid and lower trap 10/10 on/off for 10 mins, cuing to squeeze scapula when on then NMES to supraspinatus and anterior deltoid, cuing for sidelying flexion when on    Marleni received individual therapeutic exercises to develop strength, ROM and posture for 30 minutes, concurrent with NMES as noted,  including:    Date 3/4/19 3/15/19 3/18/19 3/25/19 4/1/19 4/8/19   Exercise Sets x Reps Resistance    Sets x Reps Resistance    Sets x Reps Resistance    Sets x Reps Resistance    Sets x Reps Resistance    Sets x Reps/  Resistance   Supine flexion (R) 3x5 with PT 2 x 10 with PTA 2x10 with PT 2x10 with PT  2x10 w kina 2x10 with PT  2x10 w kina 2x10 with kina  2x10 with PT   sidelying ER 3x5 with PT 2x10 with PTA 2x10 w PT 2x10 with PT  2x10 Ind 2x10 with PT  2x10 Ind 3x10 Ind   sidelying abduction 3x5 with PT 2x10 with PTA 2x10 w PT 2x10 w PT 2x10 w PT 2x10 with PT   sidelying flexion 2x10 with PT 2x10 with PTA 3X10 w PT 3x15 w cuing to avoid substitution 3x 15 10 min with NMES   sidelying scap retractions 2x10 with PT 2x10 with PTA 10 min w NMES 10 min w NMES 10 min w NMES 10 min w NMES   PROM all planes 2x10 ea 2x10 ea 2x10 ea 2x10 ea 3x10 all planes 3x10 all planes   Pulley  "PROM    x20     iso deltoid    10" hold/ 3 min with PT 10" hold/ 3 min w PT                          Marleni received the following manual therapy techniques for 15 minutes including:  Gr II-III GHJ glides all planes  Scapular mobs and distraction on R   Rev stabilization R shoulder in flexion @70 deg, ER @ 45 and 70 deg, IR @45 deg      Home Exercises Provided: sidelying shoulder flexion, small range pulleys in front of mirror (PROM only)    Pt demo good understanding of the education provided. Marleni demonstrated good return demonstration of activities.     Education provided re:  Marleni verbalized good understanding of education provided.   No spiritual or educational barriers to learning provided    Assessment     Patient tolerated treatment well. Pt completing treatment without pain. Pt completing treatment without complaint.   ROM kept in pain free range. Pt demonstrating severe muscle weakness resulting in scapular dyskinesia, impaired scapulohumeral rhythm and severe compensation of shoulder hike with attempted elevation. Pt demonstrating continued clinical weakness with inability to hold arm against gravity. Patient demonstrating more independence with AAROM exercises in gravity reduced positions. Continued compensation/substitution against gravity even with AAROM exercises. Patient habitually late for appointments which impact treatment progression.     Patient continues with positive drop arm and instability in R shoulder at very specific point of flexion. Pt able to hold isometric prior to and beyond this point (approx  deg flexion) but when shoulder reaches this arc she cannot maintain muscle control.     Plan     Continue with established Plan of Care towards PT goals.    Therapist: Berta Houston, PT  4/8/2019  "

## 2019-04-15 ENCOUNTER — CLINICAL SUPPORT (OUTPATIENT)
Dept: REHABILITATION | Facility: HOSPITAL | Age: 69
End: 2019-04-15
Payer: MEDICARE

## 2019-04-15 DIAGNOSIS — M25.511 CHRONIC RIGHT SHOULDER PAIN: ICD-10-CM

## 2019-04-15 DIAGNOSIS — M25.611 DECREASED ROM OF RIGHT SHOULDER: Primary | ICD-10-CM

## 2019-04-15 DIAGNOSIS — G89.29 CHRONIC RIGHT SHOULDER PAIN: ICD-10-CM

## 2019-04-15 DIAGNOSIS — M62.81 MUSCLE WEAKNESS OF RIGHT UPPER EXTREMITY: ICD-10-CM

## 2019-04-15 PROCEDURE — 97140 MANUAL THERAPY 1/> REGIONS: CPT

## 2019-04-15 PROCEDURE — 97032 APPL MODALITY 1+ESTIM EA 15: CPT

## 2019-04-15 PROCEDURE — 97110 THERAPEUTIC EXERCISES: CPT

## 2019-04-15 NOTE — PROGRESS NOTES
Physical Therapy Daily Note     Name: Marleni Sousa  Clinic Number: [id number]  Diagnosis:   Encounter Diagnoses   Name Primary?    Decreased ROM of right shoulder Yes    Muscle weakness of right upper extremity     Chronic right shoulder pain      Physician: Catrachito Woods MD  Precautions: orthopedic precautions per MD referral  Visit #: 7 of 12  PTA Visit #: 0  Time In: 9:15 am  Time Out: 10:00 am    Subjective     Pt reports: continued difficulty lifting arm. She's worried she did something to mess it up, but does not report any action that would have injured her repair.    Pain Scale: Marleni rates pain on a scale of 0-10 to be 5 currently.    Objective     Began with NMES to posterior deltoid and lower trap 10/10 on/off for 10 mins, cuing to squeeze scapula when on then NMES to supraspinatus and anterior deltoid, cuing for sidelying flexion when on    Marleni received individual therapeutic exercises to develop strength, ROM and posture for 30 minutes, concurrent with NMES as noted,  including:    Date 4/15/19       Exercise Sets x Reps Resistance    Sets x Reps Resistance    Sets x Reps Resistance    Sets x Reps Resistance    Sets x Reps Resistance      Supine flexion (R) 6 min with NMES, w/ Ivan       sidelying ER 3x10       sidelying abduction x10 to 90 deg       sidelying flexion 6 min with NMES       sidelying scap retractions 2x10 with PT       Shoulder IR 2x10 with PT       iso deltoid 2min with PT                                   Marleni received the following manual therapy techniques for 15 minutes including:  Gr II-III GHJ glides all planes  Scapular mobs and distraction on R   Rev stabilization R shoulder in flexion @70 deg, ER @ 45 and 70 deg, IR @45 deg Abd    Shoulder Range of Motion:    R AROM / Initial R PROM/ Initial R AROM/ Current R PROM/ Current   Flexion 20 131 45 135   Abduction 20 93 20 68   Extension 30  33 30 40   Ext.  Rotation Hand to right eyebrow 59 Hand to R eyebrow 70   Int. Rotation Hand to right hip 15 Hand to R hip 23        Home Exercises Provided: sidelying shoulder flexion, small range pulleys in front of mirror (PROM only)    Pt demo good understanding of the education provided. Marleni demonstrated good return demonstration of activities.     Education provided re:  Marleni verbalized good understanding of education provided.   No spiritual or educational barriers to learning provided    Assessment     Patient tolerated treatment well. Pt completing treatment without pain. Pt completing treatment without complaint.   ROM kept in pain free range. Pt demonstrating severe muscle weakness resulting in scapular dyskinesia, impaired scapulohumeral rhythm and severe compensation of shoulder hike with attempted elevation. Pt demonstrating continued clinical weakness with inability to hold arm against gravity. Patient demonstrating more independence with AAROM exercises in gravity reduced positions, but still unable to actively elevate arm past 90 deg. Continued compensation/substitution against gravity even with AAROM exercises.     Patient continues with positive drop arm and instability in R shoulder at very specific point of flexion. Pt able to hold isometric prior to and beyond this point (approx  deg flexion) but when shoulder reaches this arc she cannot maintain muscle control.     Pt began NMES to periscapular muscles and deltoid to encourage muscle firing and strengthening. Pt tolerates this well, but still demonstrates ROM deficits with NMES. Pt expressing discouragement with progress to this point.     Plan     Continue with established Plan of Care towards PT goals.    Therapist: Berta Houston, PT  4/15/2019

## 2020-12-08 DIAGNOSIS — M25.561 KNEE PAIN, RIGHT: Primary | ICD-10-CM

## 2020-12-11 ENCOUNTER — CLINICAL SUPPORT (OUTPATIENT)
Dept: REHABILITATION | Facility: HOSPITAL | Age: 70
End: 2020-12-11
Attending: ORTHOPAEDIC SURGERY
Payer: MEDICARE

## 2020-12-11 DIAGNOSIS — M25.561 KNEE PAIN, RIGHT: Primary | ICD-10-CM

## 2020-12-11 DIAGNOSIS — M25.561 ACUTE PAIN OF RIGHT KNEE: ICD-10-CM

## 2020-12-11 PROCEDURE — 97161 PT EVAL LOW COMPLEX 20 MIN: CPT | Mod: PN

## 2020-12-11 NOTE — PLAN OF CARE
Physical Therapy Evaluation/Plan of Care    Name: Marleni Sousa  Clinic Number: [id number]    Therapy Diagnosis:   Encounter Diagnosis   Name Primary?    Knee pain, right Yes     Physician: Dread Mckinney MD    Physician Orders: PT Eval and Treat   Medical Diagnosis: s/p right knee medial/lateral meniscal tear   Evaluation Date: 12/11/2020  Authorization period Expiration: 12/31/2020  Plan of Care Certification Period: 03/11/2021    Visit #: 1/ Visits authorized: 12  Time In: 1:45 pm   Time Out: 2:45 pm   Total Billable Time: 60 minutes    Precautions: Standard      Subjective   Date of onset: chronic knee pain   Date of Surgery: 12/02/2020       No past medical history on file.  Marleni Sousa  has no past surgical history on file.    Marleni currently has no medications in their medication list.    Review of patient's allergies indicates:  Not on File     Prior Therapy: No  for current condition; Homa is well known from previous treatment for Right shoulder following her  RTC repair and her Reverse TSR  Social History: Patient lives in a single level  home with 5 steps to enter on the porch and 3 small steps to enter home;   Occupation: retired   Prior Level of Function: Independent; lives alone;   Current Level of Function: Independent; just quit using her walker yesterday; able to perform all ADL's independently; able to manage the steps 2 feet per step with use of railing.     Pain:  Current 6/10, worst 8/10, best 4/10   Location: knee  right  Description: Aching, Grabbing and Tight  Aggravating Factors: walking; moving my leg   Easing Factors: ice, rest and elevation      Onset/JAIR: gradual    Primary concern/ Chief complaints:  History of current condition - HOMA reports: several month history of symptoms, including progressive right knee pain making it difficult for her to walk and perform standing activities.  Homa had outpatient surgery - ATS right knee for debridement of meniscus; She is  "doing well; still has some stiffness and pain, but is able to drive, walking without use of AD now.  Balaji is still having trouble with her right shoulder - unable to reach the top of her head/get her hand behind her head; still cannot reach things on an upper shelf. Dr. Woods told her to keep up the exercise.     Pts goals: To walk without pain     Objective     Observation: Balaji ambulated into clinic; stiffness noted in right knee; antalgic gait pattern.       Edema:    No significant edema noted in right knee versus left knee    Girth Measurement Joint line 5 cm below 10 cm above   Left 33 cm 31 cm 36 cm   Right 34 cm 32 cm 36.5 cm     Range of Motion:   Knee Left active Left Passive Right Active R passive    0-130  0-130 0-121 0 - 124       Lower Extremity Strength  Right LE  Left LE    Knee extension: 4+/5 Knee extension: 5/5   Knee flexion: 4/5 Knee flexion: 5/5   Hip flexion: 4+/5 Hip flexion: 5/5   Hip extension:  4/5 Hip extension: 5/5   Hip abduction: 4/5 Hip abduction: 5/5   Hip adduction: 4+/5 Hip adduction 5/5   Ankle dorsiflexion: 5/5 Ankle dorsiflexion: 5/5   Ankle plantarflexion: 4-/5 Ankle plantarflexion: 5/5       Special Tests: Deferred due to recent surgery     Step down test: 4" step - cautious and decreased eccentric control of quad, increased hip adduction; increased pain     Function:    - SLS R: 5 secs   - SLS L: 10 secs   - Squat: poor technique; unable to hip hinge without verbal and tactile cueing    - Sit <--> Stand: 8 reps in 30 secs.      Joint Mobility: Patellar restricted,   Tibiofemoral restricted,     Palpation: minimal tenderness to palpation at distal quad, scop insicions; fair ability to activate distal quad - but requires visual and tactile cueing       Sensation: intact to light touch     Flexibility:    90/90 SLR = R minimal restriction, L minimal restriction   Ely's test: R moderate restriction, L no restriction   Myesha's test: R no restriction, L no restriction   Chito " test: R no restriction, L no restriction    PT Evaluation Completed: Yes  Discussed Plan of Care with patient: Yes        Home Exercises and Patient Education Provided    Education provided re:   - progress towards goals   - role of therapy in multi - disciplinary team, goals for therapy  Pt educated on condition, POC, and expectations in therapy.  No spiritual or educational barriers to learning provided    Home exercises:  Pt will be provided HEP during course of treatment with progressions as appropriate. Pt was advised to perform these exercises free of pain, and to stop performing them if pain occurs.   HOMA demonstrated good  understanding of the education provided.       Functional Limitations Reports   Tool: LEFS   Score: 7/80  91% limitation in function       Assessment   Marleni is a 70 y.o. female referred to outpatient physical therapy with a medical diagnosis of s/p right knee ATS - medial/lateral meniscal tears, and presents to PT with antalgic gait, decreased knee ROM, decreased RLE strength, decrease in her overall functional mobility.  Patient demonstrates limitations as described in the problem list. Pt will benefit from physcial therapy services in order to maximize pain free and/or functional use of right LE. The following goals were discussed with the patient and patient is in agreement with them as to be addressed in the treatment plan.   Pt prognosis is Good.   Pt will benefit from skilled outpatient Physical Therapy to address the deficits stated above and in the chart below, provide pt/family education, and to maximize pt's level of independence.     Plan of care discussed with patient: Yes  Pt's spiritual, cultural and educational needs considered and pt agreeable to plan of care and goals as stated below:     Anticipated Barriers for therapy: none    Medical necessity is demonstrated by the following IMPAIRMENTS/PROBLEM LIST:    weakness, impaired endurance, impaired functional mobility,  gait instability, impaired balance, decreased lower extremity function, pain, decreased ROM and impaired joint extensibility    Low complexity      Goals     Long Term Goals: 6 weeks  Pain: Decrease pain to no more than 1/10 to allow for improved ability to perform daily activities.   Strength: Improve strength in right Hip to knee by 1/2 grade for improved LE stability  ROM: Improve ROM to 100% of normal limits   Functional scale: Improve score on LEFS to no more than 30% limitation.   Lifting: Lift 5 lbs to waist level, and carry for 15 feet without pain or compensation  Walking: Increase walking distance/duration to 6 blocks without pain  Postures: Increase sitting and/or standing duration to 30 mins without pain   Transfers: Perform sit <> stand transfers without increased pain or limitation - 10 reps in 30 secs.   Exercise: demonstrate independence with home exercise program to maintain gains made in therapy.          Plan   Certification Period: 12/11/2020 to 03/11/2021.    Outpatient Physical Therapy 2 times weekly for 6 weeks to include the following interventions: patient education, Gait Training, Manual Therapy, Moist Heat/ Ice, Neuromuscular Re-ed, Patient Education and Therapeutic Exercise.   Pt may be seen by PTA as part of the rehabilitation team.     I certify the need for these services furnished under this plan of treatment and while under my care.    Sofía Martines, PT          Attestation:   I have seen the patient, reviewed the therapist's plan of care, and I agree with the plan of care.   I certify the need for these services furnished under this plan of treatment and while under my care.         _______________            ________                                               _____________________  Physician/Referring Practitioner                                                            Date of Signature

## 2020-12-13 NOTE — PLAN OF CARE
PT met face to face with Jonathan Favre, PTA to discuss patient's treatment plan and progress towards established goals.  Treatment will be continued as described in initial report/eval and progress notes.  Patient will be seen by physical therapist every sixth visit and minimally once per month.    Additional information: right knee ATS - medial/lateral meniscal tear - debridement; WBAT, no restrictions.

## 2020-12-18 ENCOUNTER — CLINICAL SUPPORT (OUTPATIENT)
Dept: REHABILITATION | Facility: HOSPITAL | Age: 70
End: 2020-12-18
Attending: ORTHOPAEDIC SURGERY
Payer: MEDICARE

## 2020-12-18 DIAGNOSIS — R26.89 GAIT, ANTALGIC: ICD-10-CM

## 2020-12-18 DIAGNOSIS — R29.898 WEAKNESS OF RIGHT LOWER EXTREMITY: ICD-10-CM

## 2020-12-18 DIAGNOSIS — G89.18 POSTOPERATIVE PAIN OF RIGHT KNEE: Primary | ICD-10-CM

## 2020-12-18 DIAGNOSIS — M25.561 POSTOPERATIVE PAIN OF RIGHT KNEE: Primary | ICD-10-CM

## 2020-12-18 PROCEDURE — 97110 THERAPEUTIC EXERCISES: CPT | Mod: PN

## 2020-12-18 PROCEDURE — 97140 MANUAL THERAPY 1/> REGIONS: CPT | Mod: PN

## 2020-12-18 NOTE — PROGRESS NOTES
Physical Therapy Daily Note     Name: Marleni Carpio [facility] Number: [id number]  Diagnosis:   Encounter Diagnoses   Name Primary?    Postoperative pain of right knee Yes    Weakness of right lower extremity     Gait, antalgic      Physician: Dread Mckinney MD  Precautions: standard  Visit #: 2 of 12  PTA Visit #: 0  Time In: 8:45 am   Time Out: 9: 20 am     Subjective     Pt reports: I had the time wrong on my appointment this morning, I'm sorry;  Balaji reports that she is doing ok; knee a little stiff, but ok. Returned to MD the other day; stitches removed from scope incision; referred to Dr. Marti for her lower back pain.   Pain Scale: Marleni rates pain on a scale of 0-10 to be 3 currently.    Objective     Marleni received individual therapeutic exercises to develop strength, ROM and core stabilization for 30 minutes including:  Nu-Step x 15 mins   Quad Sets: 3 mins   SLR 10 x 2  S/L hip abduction 10 x 2   Clams x 15      Marleni received the following manual therapy techniques: Soft tissue Mobilization were applied to the: right knee  for 8 minutes including:  STM to distal quad, patellar mobilization in all planes; massage over incisions to prevent excessive scar tissue formation and to decrease sensitivity.     The patient received the following direct contact modalities after being cleared for contraindications:     The patient received the following supervised modalities after being cleared for contradictions:     Written Home Exercises Provided:   Quad Sets, SLR, Sit <>Stand; walking   Pt demo good understanding of the education provided. Marleni demonstrated good return demonstration of activities.     Education provided re:  Marleni verbalized good understanding of education provided.   No spiritual or educational barriers to learning provided    Assessment     Patient tolerated treatment well; Balaji demonstrates improved ability to activate  her quad mm; ROM is good; Should return to PLOF without difficulty   This is a 70 y.o. female referred to outpatient physical therapy and presents with a medical diagnosis of medial/lateral menisectomy Right knee and demonstrates limitations as described in the problem list. Pt prognosis is Good. Pt will continue to benefit from skilled outpatient physical therapy to address the deficits listed in the problem list, provide pt/family education and to maximize pt's level of independence in the home and community environment.     Goals as follows:  Long Term Goals: 6 weeks  Pain: Decrease pain to no more than 1/10 to allow for improved ability to perform daily activities.   Strength: Improve strength in right Hip to knee by 1/2 grade for improved LE stability  ROM: Improve ROM to 100% of normal limits   Functional scale: Improve score on LEFS to no more than 30% limitation.   Lifting: Lift 5 lbs to waist level, and carry for 15 feet without pain or compensation  Walking: Increase walking distance/duration to 6 blocks without pain  Postures: Increase sitting and/or standing duration to 30 mins without pain   Transfers: Perform sit <> stand transfers without increased pain or limitation - 10 reps in 30 secs.   Exercise: demonstrate independence with home exercise program to maintain gains made in therapy.          Plan     Continue with established Plan of Care towards PT goals.    Therapist: Sofía Martines, PT  12/18/2020

## 2020-12-22 ENCOUNTER — CLINICAL SUPPORT (OUTPATIENT)
Dept: REHABILITATION | Facility: HOSPITAL | Age: 70
End: 2020-12-22
Payer: MEDICARE

## 2020-12-22 DIAGNOSIS — R26.89 GAIT, ANTALGIC: ICD-10-CM

## 2020-12-22 DIAGNOSIS — G89.18 POSTOPERATIVE PAIN OF RIGHT KNEE: Primary | ICD-10-CM

## 2020-12-22 DIAGNOSIS — M25.561 POSTOPERATIVE PAIN OF RIGHT KNEE: Primary | ICD-10-CM

## 2020-12-22 DIAGNOSIS — R29.898 WEAKNESS OF RIGHT LOWER EXTREMITY: ICD-10-CM

## 2020-12-22 PROCEDURE — 97140 MANUAL THERAPY 1/> REGIONS: CPT | Mod: PN

## 2020-12-22 PROCEDURE — 97110 THERAPEUTIC EXERCISES: CPT | Mod: PN

## 2020-12-22 NOTE — PROGRESS NOTES
Physical Therapy Daily Note     Name: Malreni Carpio [facility] Number: [id number]  Diagnosis:   Encounter Diagnoses   Name Primary?    Postoperative pain of right knee Yes    Weakness of right lower extremity     Gait, antalgic      Physician: Dread Mckinney MD  Precautions: standard  Visit #: 3 of 12  PTA Visit #: 0  Time In: 1:00 pm    Time Out: 1:45 pm     Subjective     Pt reports: I'm doing ok today; I've been keeping busy running errands, standing on my feet quite a bit.    Pain Scale: Marleni rates pain on a scale of 0-10 to be 3 currently.    Objective     Marleni received individual therapeutic exercises to develop strength, ROM and core stabilization for 30 minutes including:  Nu-Step x 15 mins   Quad Sets: 3 mins   SLR 10 x 2  S/L hip abduction 10 x 2   Clams x 15  Bridges x 20   Ball Squeezes x 3 mins   Step-ups x 3 mins - 1 riser on step  Heel raises x 30       Marleni received the following manual therapy techniques: Soft tissue Mobilization were applied to the: right knee  for 8 minutes including:  STM to distal quad, patellar mobilization in all planes; massage over incisions to prevent excessive scar tissue formation and to decrease sensitivity.     The patient received the following direct contact modalities after being cleared for contraindications:     The patient received the following supervised modalities after being cleared for contradictions:     Written Home Exercises Provided:   Quad Sets, SLR, Sit <>Stand; walking   Pt demo good understanding of the education provided. Marleni demonstrated good return demonstration of activities.     Education provided re:  Marleni verbalized good understanding of education provided.   No spiritual or educational barriers to learning provided    Assessment     Patient tolerated treatment well; Balaji demonstrates improved ability to activate her quad mm; ROM is good; Should return to PLOF without  difficulty   This is a 70 y.o. female referred to outpatient physical therapy and presents with a medical diagnosis of medial/lateral menisectomy Right knee and demonstrates limitations as described in the problem list. Pt prognosis is Good. Pt will continue to benefit from skilled outpatient physical therapy to address the deficits listed in the problem list, provide pt/family education and to maximize pt's level of independence in the home and community environment.     Goals as follows:  Long Term Goals: 6 weeks  Pain: Decrease pain to no more than 1/10 to allow for improved ability to perform daily activities.   Strength: Improve strength in right Hip to knee by 1/2 grade for improved LE stability  ROM: Improve ROM to 100% of normal limits   Functional scale: Improve score on LEFS to no more than 30% limitation.   Lifting: Lift 5 lbs to waist level, and carry for 15 feet without pain or compensation  Walking: Increase walking distance/duration to 6 blocks without pain  Postures: Increase sitting and/or standing duration to 30 mins without pain   Transfers: Perform sit <> stand transfers without increased pain or limitation - 10 reps in 30 secs.   Exercise: demonstrate independence with home exercise program to maintain gains made in therapy.          Plan     Continue with established Plan of Care towards PT goals.    Therapist: Sofía Martines, PT  12/22/2020

## 2020-12-28 ENCOUNTER — CLINICAL SUPPORT (OUTPATIENT)
Dept: REHABILITATION | Facility: HOSPITAL | Age: 70
End: 2020-12-28
Payer: MEDICARE

## 2020-12-28 DIAGNOSIS — R29.898 WEAKNESS OF RIGHT LOWER EXTREMITY: Primary | ICD-10-CM

## 2020-12-28 DIAGNOSIS — M25.561 POSTOPERATIVE PAIN OF RIGHT KNEE: ICD-10-CM

## 2020-12-28 DIAGNOSIS — G89.18 POSTOPERATIVE PAIN OF RIGHT KNEE: ICD-10-CM

## 2020-12-28 DIAGNOSIS — R26.89 GAIT, ANTALGIC: ICD-10-CM

## 2020-12-28 PROCEDURE — 97110 THERAPEUTIC EXERCISES: CPT | Mod: PN

## 2020-12-28 PROCEDURE — 97140 MANUAL THERAPY 1/> REGIONS: CPT | Mod: PN

## 2020-12-28 NOTE — PROGRESS NOTES
Physical Therapy Daily Note     Name: Marleni Carpio [facility] Number: [id number]  Diagnosis:   Encounter Diagnoses   Name Primary?    Postoperative pain of right knee     Weakness of right lower extremity Yes    Gait, antalgic      Physician: Dread Mckinney MD  Precautions: standard  Visit #: 4 of 12  PTA Visit #: 0  Time In: 1:45 pm    Time Out: 2:30 pm     Subjective     Pt reports:  My knee is ok, but my back is killing me.  I spent way too long standing on my feet cooking over the holidays.     Pain Scale: Marleni rates pain on a scale of 0-10 to be 3 currently.    Objective     Marleni received individual therapeutic exercises to develop strength, ROM and core stabilization for 30 minutes including:  Nu-Step x 15 mins > progressed to Recumbent Bike today - level 6 x 15 mins   Quad Sets: 3 mins   SLR 10 x 2  S/L hip abduction 10 x 2   Clams x 15  Bridges x 20   Ball Squeezes x 3 mins   Step-ups x 3 mins - 1 riser on step  Heel raises x 30       Marleni received the following manual therapy techniques: Soft tissue Mobilization were applied to the: right knee  for 8 minutes including:  STM to distal quad, patellar mobilization in all planes; massage over incisions to prevent excessive scar tissue formation and to decrease sensitivity.     The patient received the following direct contact modalities after being cleared for contraindications:     The patient received the following supervised modalities after being cleared for contradictions:     Written Home Exercises Provided:   Quad Sets, SLR, Sit <>Stand; walking   Pt demo good understanding of the education provided. Marleni demonstrated good return demonstration of activities.     Education provided re:  Marleni verbalized good understanding of education provided.   No spiritual or educational barriers to learning provided    Assessment     Patient tolerated treatment well; Balaji demonstrates improved  ability to activate her quad mm; ROM is good; Should return to PLOF without difficulty   This is a 70 y.o. female referred to outpatient physical therapy and presents with a medical diagnosis of medial/lateral menisectomy Right knee and demonstrates limitations as described in the problem list. Pt prognosis is Good. Pt will continue to benefit from skilled outpatient physical therapy to address the deficits listed in the problem list, provide pt/family education and to maximize pt's level of independence in the home and community environment.     Goals as follows:  Long Term Goals: 6 weeks  Pain: Decrease pain to no more than 1/10 to allow for improved ability to perform daily activities.   Strength: Improve strength in right Hip to knee by 1/2 grade for improved LE stability  ROM: Improve ROM to 100% of normal limits   Functional scale: Improve score on LEFS to no more than 30% limitation.   Lifting: Lift 5 lbs to waist level, and carry for 15 feet without pain or compensation  Walking: Increase walking distance/duration to 6 blocks without pain  Postures: Increase sitting and/or standing duration to 30 mins without pain   Transfers: Perform sit <> stand transfers without increased pain or limitation - 10 reps in 30 secs.   Exercise: demonstrate independence with home exercise program to maintain gains made in therapy.          Plan     Continue with established Plan of Care towards PT goals.    Therapist: Sofía Martines, PT  12/28/2020

## 2020-12-30 ENCOUNTER — CLINICAL SUPPORT (OUTPATIENT)
Dept: REHABILITATION | Facility: HOSPITAL | Age: 70
End: 2020-12-30
Payer: MEDICARE

## 2020-12-30 DIAGNOSIS — M25.561 POSTOPERATIVE PAIN OF RIGHT KNEE: ICD-10-CM

## 2020-12-30 DIAGNOSIS — R29.898 WEAKNESS OF RIGHT LOWER EXTREMITY: Primary | ICD-10-CM

## 2020-12-30 DIAGNOSIS — G89.18 POSTOPERATIVE PAIN OF RIGHT KNEE: ICD-10-CM

## 2020-12-30 DIAGNOSIS — R26.89 GAIT, ANTALGIC: ICD-10-CM

## 2020-12-30 PROCEDURE — 97110 THERAPEUTIC EXERCISES: CPT | Mod: PN

## 2021-01-04 ENCOUNTER — CLINICAL SUPPORT (OUTPATIENT)
Dept: REHABILITATION | Facility: HOSPITAL | Age: 71
End: 2021-01-04
Payer: MEDICARE

## 2021-01-04 DIAGNOSIS — R29.898 WEAKNESS OF RIGHT LOWER EXTREMITY: ICD-10-CM

## 2021-01-04 DIAGNOSIS — R26.89 GAIT, ANTALGIC: ICD-10-CM

## 2021-01-04 DIAGNOSIS — G89.18 POSTOPERATIVE PAIN OF RIGHT KNEE: Primary | ICD-10-CM

## 2021-01-04 DIAGNOSIS — M25.561 POSTOPERATIVE PAIN OF RIGHT KNEE: Primary | ICD-10-CM

## 2021-01-04 PROCEDURE — 97140 MANUAL THERAPY 1/> REGIONS: CPT | Mod: PN

## 2021-01-04 PROCEDURE — 97110 THERAPEUTIC EXERCISES: CPT | Mod: PN

## 2021-01-06 ENCOUNTER — CLINICAL SUPPORT (OUTPATIENT)
Dept: REHABILITATION | Facility: HOSPITAL | Age: 71
End: 2021-01-06
Payer: MEDICARE

## 2021-01-06 DIAGNOSIS — R29.898 WEAKNESS OF RIGHT LOWER EXTREMITY: Primary | ICD-10-CM

## 2021-01-06 DIAGNOSIS — R26.89 GAIT, ANTALGIC: ICD-10-CM

## 2021-01-06 DIAGNOSIS — G89.18 POSTOPERATIVE PAIN OF RIGHT KNEE: ICD-10-CM

## 2021-01-06 DIAGNOSIS — M25.561 POSTOPERATIVE PAIN OF RIGHT KNEE: ICD-10-CM

## 2021-01-06 PROCEDURE — 97140 MANUAL THERAPY 1/> REGIONS: CPT | Mod: PN

## 2021-01-06 PROCEDURE — 97110 THERAPEUTIC EXERCISES: CPT | Mod: PN

## 2021-01-11 ENCOUNTER — CLINICAL SUPPORT (OUTPATIENT)
Dept: REHABILITATION | Facility: HOSPITAL | Age: 71
End: 2021-01-11
Payer: MEDICARE

## 2021-01-11 DIAGNOSIS — G89.18 POSTOPERATIVE PAIN OF RIGHT KNEE: ICD-10-CM

## 2021-01-11 DIAGNOSIS — R29.898 WEAKNESS OF RIGHT LOWER EXTREMITY: Primary | ICD-10-CM

## 2021-01-11 DIAGNOSIS — M25.561 POSTOPERATIVE PAIN OF RIGHT KNEE: ICD-10-CM

## 2021-01-11 DIAGNOSIS — R26.89 GAIT, ANTALGIC: ICD-10-CM

## 2021-01-11 PROCEDURE — 97110 THERAPEUTIC EXERCISES: CPT | Mod: PN

## 2021-01-13 ENCOUNTER — CLINICAL SUPPORT (OUTPATIENT)
Dept: REHABILITATION | Facility: HOSPITAL | Age: 71
End: 2021-01-13
Payer: MEDICARE

## 2021-01-13 DIAGNOSIS — G89.18 POSTOPERATIVE PAIN OF RIGHT KNEE: Primary | ICD-10-CM

## 2021-01-13 DIAGNOSIS — R29.898 WEAKNESS OF RIGHT LOWER EXTREMITY: ICD-10-CM

## 2021-01-13 DIAGNOSIS — M25.561 POSTOPERATIVE PAIN OF RIGHT KNEE: Primary | ICD-10-CM

## 2021-01-13 DIAGNOSIS — R26.89 GAIT, ANTALGIC: ICD-10-CM

## 2021-01-13 PROCEDURE — 97110 THERAPEUTIC EXERCISES: CPT | Mod: PN,CQ

## 2021-01-20 ENCOUNTER — CLINICAL SUPPORT (OUTPATIENT)
Dept: REHABILITATION | Facility: HOSPITAL | Age: 71
End: 2021-01-20
Payer: MEDICARE

## 2021-01-20 DIAGNOSIS — G89.18 POSTOPERATIVE PAIN OF RIGHT KNEE: ICD-10-CM

## 2021-01-20 DIAGNOSIS — R29.898 WEAKNESS OF RIGHT LOWER EXTREMITY: Primary | ICD-10-CM

## 2021-01-20 DIAGNOSIS — M25.561 POSTOPERATIVE PAIN OF RIGHT KNEE: ICD-10-CM

## 2021-01-20 DIAGNOSIS — R26.89 GAIT, ANTALGIC: ICD-10-CM

## 2021-01-20 PROCEDURE — 97110 THERAPEUTIC EXERCISES: CPT | Mod: PN

## 2021-01-22 ENCOUNTER — CLINICAL SUPPORT (OUTPATIENT)
Dept: REHABILITATION | Facility: HOSPITAL | Age: 71
End: 2021-01-22
Payer: MEDICARE

## 2021-01-22 DIAGNOSIS — R29.898 WEAKNESS OF RIGHT LOWER EXTREMITY: ICD-10-CM

## 2021-01-22 DIAGNOSIS — R26.89 GAIT, ANTALGIC: ICD-10-CM

## 2021-01-22 DIAGNOSIS — G89.18 POSTOPERATIVE PAIN OF RIGHT KNEE: Primary | ICD-10-CM

## 2021-01-22 DIAGNOSIS — M25.561 POSTOPERATIVE PAIN OF RIGHT KNEE: Primary | ICD-10-CM

## 2021-01-22 PROCEDURE — 97110 THERAPEUTIC EXERCISES: CPT | Mod: PN

## 2021-01-22 PROCEDURE — 97140 MANUAL THERAPY 1/> REGIONS: CPT | Mod: PN

## 2021-01-27 ENCOUNTER — CLINICAL SUPPORT (OUTPATIENT)
Dept: REHABILITATION | Facility: HOSPITAL | Age: 71
End: 2021-01-27
Payer: MEDICARE

## 2021-01-27 DIAGNOSIS — G89.18 POSTOPERATIVE PAIN OF RIGHT KNEE: Primary | ICD-10-CM

## 2021-01-27 DIAGNOSIS — M25.561 POSTOPERATIVE PAIN OF RIGHT KNEE: Primary | ICD-10-CM

## 2021-01-27 DIAGNOSIS — R29.898 WEAKNESS OF RIGHT LOWER EXTREMITY: ICD-10-CM

## 2021-01-27 DIAGNOSIS — R26.89 GAIT, ANTALGIC: ICD-10-CM

## 2021-01-27 PROCEDURE — 97110 THERAPEUTIC EXERCISES: CPT | Mod: PN

## 2021-01-27 PROCEDURE — 97140 MANUAL THERAPY 1/> REGIONS: CPT | Mod: PN

## 2021-02-03 ENCOUNTER — CLINICAL SUPPORT (OUTPATIENT)
Dept: REHABILITATION | Facility: HOSPITAL | Age: 71
End: 2021-02-03
Payer: MEDICARE

## 2021-02-03 DIAGNOSIS — R29.898 WEAKNESS OF RIGHT LOWER EXTREMITY: Primary | ICD-10-CM

## 2021-02-03 DIAGNOSIS — M25.561 POSTOPERATIVE PAIN OF RIGHT KNEE: ICD-10-CM

## 2021-02-03 DIAGNOSIS — R26.89 GAIT, ANTALGIC: ICD-10-CM

## 2021-02-03 DIAGNOSIS — G89.18 POSTOPERATIVE PAIN OF RIGHT KNEE: ICD-10-CM

## 2021-02-03 PROCEDURE — 97110 THERAPEUTIC EXERCISES: CPT | Mod: PN

## 2021-03-22 PROBLEM — M25.561 POSTOPERATIVE PAIN OF RIGHT KNEE: Status: RESOLVED | Noted: 2020-12-18 | Resolved: 2021-03-22

## 2021-03-22 PROBLEM — G89.18 POSTOPERATIVE PAIN OF RIGHT KNEE: Status: RESOLVED | Noted: 2020-12-18 | Resolved: 2021-03-22

## 2022-07-18 DIAGNOSIS — R26.89 BALANCE PROBLEM: Primary | ICD-10-CM

## 2022-08-10 ENCOUNTER — CLINICAL SUPPORT (OUTPATIENT)
Dept: REHABILITATION | Facility: HOSPITAL | Age: 72
End: 2022-08-10
Payer: MEDICARE

## 2022-08-10 DIAGNOSIS — G89.29 CHRONIC PAIN OF BOTH KNEES: ICD-10-CM

## 2022-08-10 DIAGNOSIS — R26.89 BALANCE PROBLEM: ICD-10-CM

## 2022-08-10 DIAGNOSIS — R26.89 GAIT, ANTALGIC: Primary | ICD-10-CM

## 2022-08-10 DIAGNOSIS — M25.562 CHRONIC PAIN OF BOTH KNEES: ICD-10-CM

## 2022-08-10 DIAGNOSIS — M25.561 CHRONIC PAIN OF BOTH KNEES: ICD-10-CM

## 2022-08-10 PROCEDURE — 97161 PT EVAL LOW COMPLEX 20 MIN: CPT | Mod: PN

## 2022-08-10 NOTE — PLAN OF CARE
[facility]  Physical Therapy Initial Evaluation / Plan Of Care    Name: Marleni Sousa  Clinic Number: [id number]    Therapy Diagnosis:   Encounter Diagnoses   Name Primary?    Balance problem     Chronic pain of both knees     Gait, antalgic Yes     Physician: Dread Mckinney MD    Physician Orders: PT Eval and Treat   Medical Diagnosis: primary OA bilateral knees   Evaluation Date: 8/10/2022  Authorization period Expiration: 12/31/2022  Plan of Care Certification Period: 11/5/2022    Visit #: 1/ Visits authorized: 1  Time In:  10:45 am   Time Out: 11:40 am   Total Billable Time: 50 minutes    Precautions: Standard      Subjective   Date of onset: about 3 weeks ago   Date of Surgery: n/a        No past medical history on file.  Marleni Sousa  has no past surgical history on file.    Marleni currently has no medications in their medication list.    Review of patient's allergies indicates:  Not on File     Imaging, : nothing on file     Prior Therapy: Balaji is well known from previous encounters, Nothing recent for current condition  Social History: Patient lives in a single level home with 8 steps to enter  -  5 steps have a railing,  3 steps do not.  She lives alone; reports concerned about falling going up/down her steps; Also has tub/shower combo and has difficulty getting into/out of tub at times; seat in tub so that she can shower safely.   Occupation: retired   Prior Level of Function: Independent, level of activity has always varied; She was D/C'd from PT 2/3/2021 following ATS for menisectomy on right knee. Back pain was as issue at this time as well - was going to initial appointment with Dr. Marti in Feb 2021. Back pain as been as issue since this time.     Current Level of Function: Remains Independent, sedentary, no dedicated exercise;   Patient reports having increased falls recently; dizziness, clumsy; L4/L5 nerve impingement - having an ablation performed in  next couple of weeks - has had a couple of NOE's without any pain relief, performed nerve block with good success thus the ablation. Homa stated that she was referred to Dr. Gamboa (neurology) for her falls - started her on B-12 shots which have helped; negative for diabetes, Will be having EMG/NCV in coming weeks.  Indicated that Dr. Gamboa feels she has neuropathy.     Pain:  Current 4/10, worst 8/10, best 4/10   Location: knee  bilateral  Description: Aching  Aggravating Factors: Standing and Walking; limited standing tolerance to 20 mins before needing to sit - lower back and knees.   Easing Factors: rest      Onset/JAIR: sudden - getting out of her car - foot stayed planted and knee went the other way.  This is what caused left knee pain about 3 weeks ago.     Primary concern/ Chief complaints:  History of current condition - HOMA reports: She has been falling over the past several months, being evaluated for neuropathy by Dr. Gamboa;  Seeing Dr. Matheus Marti for her lower back pain that has been going on for well over a year; Will be getting an ablation in the next couple of weeks as the trial nerve block was successful.  She hurt her knee getting out of the car several weeks ago - planted her left foot and twisted her knee.  Reported that Dr. Marti gave her a steroid shot as she was unable to get an appointment with Dr. Mckinney right away.  Homa is c/o continual nausea - trying to find a MD to help her with this; c/o dizziness - noted when getting up in the mornings, not always when lying down; feels like she gets transient dizziness at times when walking.  Homa stated that she feels like she has no strength in her legs; trouble going up steps, getting up/down from chair as well as her history of falls. Dr. Mckinney suggested she join a gym, Homa didn't like that idea so Dr. Mckinney said he would send her to therapy.     Pts goals: To strengthen my legs and lower back so that I can do more     Objective      Observation: Balaji ambulated into clinic, using no AD; good gait pace noted; no obvious deviations; She is alert/oriented x 4    Posture:  Valgus knees; slight forward head     Edema: no edema     Range of Motion:   Knee Left active Left Passive Right Active R passive     0 - 140 wfl 0 - 140  wfl       Lower Extremity Strength   Left Right   Knee extension: 5/5 5/5   Knee flexion: 4/5 4/5   Hip flexion: 4+/5 4+/5   Hip extension:  4/5 4/5   Hip abduction: 4/5 4/5   Hip adduction: 4/5 4/5   Hip IR 4/5 4/5   Hip ER 4+/5 4+/5   Ankle dorsiflexion: 5/5 5/5   Ankle plantarflexion: 5/5 5/5       Special Tests:  Hyperextension of bilateral knees; no instability noted medial/lateral; negative patellar grind test; negative Prasanth's , ACL/PCL:  negative / stable     Step down test: valgus collapse noted on both right and left knees with testing.     Function:    - SLS R: able to  foot, but cannot hold balance at all   - SLS L: able to  foot, but cannot hold balance at all   -Tandem Stance: able to perform and hold x 20 secs   - Squat: poor technique, increased knee pain    - Sit <--> Stand:10 reps in 30 secs.    -gait: no obvious deficits noted, able to change speed without LOB; able to turn to right/left without LOB   Able to scan environment while walking with slight decrease in speed, but no LOB, or marked deviation from pathway.   - Rhomberg - negative EO/EC on firm surface      Joint Mobility: Patellar unrestricted,   Tibiofemoral unrestricted,     Palpation: minimal tenderness to palpation at left knee - pes anserine;     Sensation: intact to light touch noted BLE's     Flexibility:    90/90 SLR = R no restriction, L no restriction   Ely's test: R no restriction, L no restriction   Myesha's test: R no restriction, L no restriction   Chito test: R no restriction, L no restriction    PT Evaluation Completed: Yes  Discussed Plan of Care with patient: Yes      Home Exercises and Patient Education  Provided    Education provided re:   - progress towards goals   - role of therapy in multi - disciplinary team, goals for therapy  Pt educated on condition, POC, and expectations in therapy.  No spiritual or educational barriers to learning provided    Home exercises:  Pt will be provided HEP during course of treatment with progressions as appropriate. Pt was advised to perform these exercises free of pain, and to stop performing them if pain occurs.   HOMA demonstrated good  understanding of the education provided.     /Limitation/Restriction for FOTO Balance  Survey    Therapist reviewed FOTO scores for Marleni Sousa on 8/10/2022.   FOTO documents entered into [employer] - see Media section.    Limitation Score: 55%         Assessment   Marleni is a 72 y.o. female referred to outpatient physical therapy with a medical diagnosis of bilateral knee OA, and presents to PT with general deconditioning, history of falls, neuropathy (?) BLE's . Patient demonstrates limitations as described in the problem list. Pt will benefit from physcial therapy services in order to maximize pain free and/or functional use of bilateral LE's for functional mobility and to decrease fall risk. The following goals were discussed with the patient and patient is in agreement with them as to be addressed in the treatment plan.   Pt prognosis is Good.   Pt will benefit from skilled outpatient Physical Therapy to address the deficits stated above and in the chart below, provide pt/family education, and to maximize pt's level of independence.     Plan of care discussed with patient: Yes  Pt's spiritual, cultural and educational needs considered and pt agreeable to plan of care and goals as stated below:     Anticipated Barriers for therapy: none    Medical necessity is demonstrated by the following IMPAIRMENTS/PROBLEM LIST:    weakness, impaired endurance, impaired sensation, impaired functional mobility, impaired balance, decreased lower  extremity function, pain and decreased ROM      Medical Necessity is demonstrated by the following  History  Co-morbidities and personal factors that may impact the plan of care Co-morbidities:   CAD    Personal Factors:   no deficits  0= low, 1-2 = mod, 3+ = high   low   Examination  Body Structures and Functions, activity limitations and participation restrictions that may impact the plan of care Body Regions:   back  lower extremities    Body Systems:    gross symmetry  ROM  strength  gross coordinated movement  balance    Participation Restrictions:   none    Activity limitations:   Mobility  lifting and carrying objects  walking    Self care  no deficits    Domestic Life  shopping  cooking  doing house work (cleaning house, washing dishes, laundry)    Community and Social Life  community life  recreation and leisure  1-2 = low, 3+ = mod, 4+ = high       moderate   Clinical Presentation stable and uncomplicated  Stable/uncomplicated = low, evolving/changing = mod,  Unstable/unpredictable = high low   Decision Making/ Complexity Score: low           Goals     Short Term Goals: 3 weeks  Pain: reduce max pain levels to no more than 5/10 in order to improve performance of daily activities   Demonstrate compliance with initial exercise program    Long Term Goals: 6 weeks    Pain: Decrease pain to no more than 3/10 to allow for increased standing, walking, and performance of daily activities.   Strength: Improve strength in bilateral LE's  to 5/5 for improved LE stability  Functional scale: Improve score on FOTO - balance  to 40% limitation   Lifting: Lift a bag of groceries from trunk and carry up steps into house without pain, compensation or LOB.   Walking: Increase walking distance to 1/4 mile without pain  Postures: Increase standing duration to > 20 mins without pain   Transfers: Perform sit to stand from chair transfers without increased pain or limitation  Exercise: demonstrate independence with home exercise  program to maintain gains made in therapy.          Plan   Certification Period: 8/10/2022 to 11/8/2022.    Outpatient Physical Therapy 2 times weekly for 6 weeks to include the following interventions: patient education, Aquatic Therapy, Gait Training, Manual Therapy, Moist Heat/ Ice, Neuromuscular Re-ed, Patient Education, Therapeutic Activities and Therapeutic Exercise.   Pt may be seen by PTA as part of the rehabilitation team.     I certify the need for these services furnished under this plan of treatment and while under my care.    Sofía Martines, PT          Attestation:   I have seen the patient, reviewed the therapist's plan of care, and I agree with the plan of care.   I certify the need for these services furnished under this plan of treatment and while under my care.         _______________            ________                                               _____________________  Physician/Referring Practitioner                                                            Date of Signature

## 2022-08-11 NOTE — PLAN OF CARE
PT met face to face with Jonathan Favre, PTA to discuss patient's treatment plan and progress towards established goals.  Treatment will be continued as described in initial report/eval and progress notes.  Patient will be seen by physical therapist every sixth visit and minimally once per month.    Additional information: general LE weakness, decreased endurance; history of falls.

## 2022-08-15 ENCOUNTER — CLINICAL SUPPORT (OUTPATIENT)
Dept: REHABILITATION | Facility: HOSPITAL | Age: 72
End: 2022-08-15
Payer: MEDICARE

## 2022-08-15 DIAGNOSIS — M25.562 CHRONIC PAIN OF BOTH KNEES: Primary | ICD-10-CM

## 2022-08-15 DIAGNOSIS — G89.29 CHRONIC PAIN OF BOTH KNEES: Primary | ICD-10-CM

## 2022-08-15 DIAGNOSIS — M25.561 CHRONIC PAIN OF BOTH KNEES: Primary | ICD-10-CM

## 2022-08-15 PROCEDURE — 97110 THERAPEUTIC EXERCISES: CPT | Mod: PN,CQ

## 2022-08-15 NOTE — PROGRESS NOTES
"[facility]   Physical Therapy Treatment Note     Name: Marleni Carpio Lars  Clinic Number: [id number]    Therapy Diagnosis:   Encounter Diagnosis   Name Primary?    Chronic pain of both knees Yes     Physician: Dread Mckinney MD    Visit Date: 8/15/2022    Physician Orders: PT Eval and Treat   Medical Diagnosis: primary OA bilateral knees   Evaluation Date: 8/10/2022  Authorization period Expiration: 12/31/2022  Plan of Care Certification Period: 11/5/2022     Visit #: 2/ Visits authorized: 12    PTA Visit #: 1/5     Time In: 8:05 AM  Time Out: 8:50 AM  Total Billable Time: 45 minutes    SUBJECTIVE     Pt reports: No new c/o's.  She was not compliant with home exercise program as one has not been issued yet.   Response to previous treatment: N/A  Functional change: N/A    Pain: 4/10  Location: bilateral knee      OBJECTIVE     Objective Measures updated at progress report unless specified.     Treatment     HOMA received the treatments listed below:      therapeutic exercises to develop strength, endurance, ROM and flexibility for 40 minutes including:  Recumbent Bike level 5 x 12 min  Heel Raises x 15  Toe Taps on 6" step x 2 min  Step-ups on 6" step x 10 ea  Standing Hip Flex/Abd/Ext x 10 ea  Sit to Stand x 10  Bridges x 10  Ball Squeezes x 2 min  QS x 2 min  SLR x 10 ea  SAQ on small gray roll x 2 min      manual therapy techniques:  were applied to the:  for  minutes, including:      neuromuscular re-education activities to improve:  for  minutes. The following activities were included:      therapeutic activities to improve functional performance for   minutes, including:      gait training to improve functional mobility and safety for   minutes, including:      direct contact modalities after being cleared for contraindications:     supervised modalities after being cleared for contradictions:     hot pack for  minutes to .    cold pack for  minutes to .        Patient Education " and Home Exercises     Home Exercises Provided and Patient Education Provided     Education provided:       Written Home Exercises Provided:  Exercises were reviewed and HOMA was able to demonstrate them prior to the end of the session.  HOMA demonstrated good  understanding of the education provided. See EMR under Patient Instructions for exercises provided during therapy sessions    ASSESSMENT     Patient did well with exercises; no increased symptoms noted; general LE weakness noted.    HOMA Is progressing well towards her goals.   Pt prognosis is Good.     Pt will continue to benefit from skilled outpatient physical therapy to address the deficits listed in the problem list box on initial evaluation, provide pt/family education and to maximize pt's level of independence in the home and community environment.     Pt's spiritual, cultural and educational needs considered and pt agreeable to plan of care and goals.     Anticipated barriers to physical therapy: None    Goals:   Short Term Goals: 3 weeks  Pain: reduce max pain levels to no more than 5/10 in order to improve performance of daily activities   Demonstrate compliance with initial exercise program     Long Term Goals: 6 weeks     Pain: Decrease pain to no more than 3/10 to allow for increased standing, walking, and performance of daily activities.   Strength: Improve strength in bilateral LE's  to 5/5 for improved LE stability  Functional scale: Improve score on FOTO - balance  to 40% limitation   Lifting: Lift a bag of groceries from trunk and carry up steps into house without pain, compensation or LOB.   Walking: Increase walking distance to 1/4 mile without pain  Postures: Increase standing duration to > 20 mins without pain   Transfers: Perform sit to stand from chair transfers without increased pain or limitation  Exercise: demonstrate independence with home exercise program to maintain gains made in therapy.         PLAN     Continue per POC and  progress as tolerated to improve strength and mobility.     Jonathan Favre, PTA

## 2022-08-17 ENCOUNTER — CLINICAL SUPPORT (OUTPATIENT)
Dept: REHABILITATION | Facility: HOSPITAL | Age: 72
End: 2022-08-17
Payer: MEDICARE

## 2022-08-17 DIAGNOSIS — M25.561 CHRONIC PAIN OF BOTH KNEES: Primary | ICD-10-CM

## 2022-08-17 DIAGNOSIS — G89.29 CHRONIC PAIN OF BOTH KNEES: Primary | ICD-10-CM

## 2022-08-17 DIAGNOSIS — M25.562 CHRONIC PAIN OF BOTH KNEES: Primary | ICD-10-CM

## 2022-08-17 PROCEDURE — 97110 THERAPEUTIC EXERCISES: CPT | Mod: PN,CQ

## 2022-08-17 NOTE — PROGRESS NOTES
"[facility]   Physical Therapy Treatment Note     Name: Marleni Carpio Lars  Clinic Number: [id number]    Therapy Diagnosis:   Encounter Diagnosis   Name Primary?    Chronic pain of both knees Yes     Physician: Dread Mckinney MD    Visit Date: 8/17/2022    Physician Orders: PT Eval and Treat   Medical Diagnosis: primary OA bilateral knees   Evaluation Date: 8/10/2022  Authorization period Expiration: 12/31/2022  Plan of Care Certification Period: 11/5/2022     Visit #: 3/ Visits authorized: 12    PTA Visit #: 2/5     Time In: 8:45 AM  Time Out: 9:30 AM  Total Billable Time: 40 minutes    SUBJECTIVE     Pt reports: No new c/o's.  She was not compliant with home exercise program as one has not been issued yet.   Response to previous treatment: N/A  Functional change: N/A    Pain: 3/10  Location: bilateral knee      OBJECTIVE     Objective Measures updated at progress report unless specified.     Treatment     HOMA received the treatments listed below:      therapeutic exercises to develop strength, endurance, ROM and flexibility for 40 minutes including:  Recumbent Bike level 5 x 12 min  Heel Raises x 15  Toe Taps on 6" step x 2 min  Step-ups on 6" step x 10 ea  Standing Hip Flex/Abd/Ext x 10 ea  Sit to Stand x 10  Bridges x 10  Ball Squeezes x 2 min  QS x 2 min  SLR 10 x 2 ea  SAQ on small gray roll x 2 min      manual therapy techniques:  were applied to the:  for  minutes, including:      neuromuscular re-education activities to improve:  for  minutes. The following activities were included:      therapeutic activities to improve functional performance for   minutes, including:      gait training to improve functional mobility and safety for   minutes, including:      direct contact modalities after being cleared for contraindications:     supervised modalities after being cleared for contradictions:     hot pack for  minutes to .    cold pack for  minutes to .        Patient " Education and Home Exercises     Home Exercises Provided and Patient Education Provided     Education provided:       Written Home Exercises Provided:  Exercises were reviewed and HOMA was able to demonstrate them prior to the end of the session.  HOMA demonstrated good  understanding of the education provided. See EMR under Patient Instructions for exercises provided during therapy sessions    ASSESSMENT     Patient did well with exercises; no increased symptoms noted; general LE weakness noted.    HOMA Is progressing well towards her goals.   Pt prognosis is Good.     Pt will continue to benefit from skilled outpatient physical therapy to address the deficits listed in the problem list box on initial evaluation, provide pt/family education and to maximize pt's level of independence in the home and community environment.     Pt's spiritual, cultural and educational needs considered and pt agreeable to plan of care and goals.     Anticipated barriers to physical therapy: None    Goals:   Short Term Goals: 3 weeks  Pain: reduce max pain levels to no more than 5/10 in order to improve performance of daily activities   Demonstrate compliance with initial exercise program     Long Term Goals: 6 weeks     Pain: Decrease pain to no more than 3/10 to allow for increased standing, walking, and performance of daily activities.   Strength: Improve strength in bilateral LE's  to 5/5 for improved LE stability  Functional scale: Improve score on FOTO - balance  to 40% limitation   Lifting: Lift a bag of groceries from trunk and carry up steps into house without pain, compensation or LOB.   Walking: Increase walking distance to 1/4 mile without pain  Postures: Increase standing duration to > 20 mins without pain   Transfers: Perform sit to stand from chair transfers without increased pain or limitation  Exercise: demonstrate independence with home exercise program to maintain gains made in therapy.         PLAN     Continue per  POC and progress as tolerated to improve strength and mobility.     Jonathan Favre, PTA

## 2022-08-29 ENCOUNTER — CLINICAL SUPPORT (OUTPATIENT)
Dept: REHABILITATION | Facility: HOSPITAL | Age: 72
End: 2022-08-29
Payer: MEDICARE

## 2022-08-29 DIAGNOSIS — G89.29 CHRONIC PAIN OF BOTH KNEES: Primary | ICD-10-CM

## 2022-08-29 DIAGNOSIS — M25.562 CHRONIC PAIN OF BOTH KNEES: Primary | ICD-10-CM

## 2022-08-29 DIAGNOSIS — M25.561 CHRONIC PAIN OF BOTH KNEES: Primary | ICD-10-CM

## 2022-08-29 PROCEDURE — 97110 THERAPEUTIC EXERCISES: CPT | Mod: PN

## 2022-08-29 NOTE — PROGRESS NOTES
"[facility]   Physical Therapy Treatment Note     Name: Marleni Sousa  Clinic Number: [id number]    Therapy Diagnosis:   Encounter Diagnosis   Name Primary?    Chronic pain of both knees Yes     Physician: Dread Mckinney MD    Visit Date: 8/29/2022    Physician Orders: PT Eval and Treat   Medical Diagnosis: primary OA bilateral knees   Evaluation Date: 8/10/2022  Authorization period Expiration: 12/31/2022  Plan of Care Certification Period: 11/5/2022     Visit #: 4/ Visits authorized: 12    PTA Visit #:  05     Time In: 8:30 AM  Time Out:  9:15  AM  Total Billable Time: 45 minutes    SUBJECTIVE     Pt reports: Had NCV on Monday and lumbar nerve ablation on Wednesday last week; lower back remains tender. Knee pain not as bad.   She was not compliant with home exercise program as one has not been issued yet.   Response to previous treatment: N/A  Functional change: N/A    Pain: 3-4/10   Location: bilateral knee      OBJECTIVE     Objective Measures updated at progress report unless specified.     Treatment     HOMA received the treatments listed below:      therapeutic exercises to develop strength, endurance, ROM and flexibility for 40 minutes including:  Recumbent Bike level 5 x 12 min  Heel Raises x 15  Toe Taps on 6" step x 2 min  Step-ups on 6" step x 10 ea  Standing Hip Flex/Abd/Ext x 10 ea  Sit to Stand x 10  Bridges x 10  Ball Squeezes x 2 min  QS x 2 min  SLR 10 x 2 ea  SAQ on small gray roll x 2 min      manual therapy techniques:  were applied to the:  for  minutes, including:      neuromuscular re-education activities to improve:  for  minutes. The following activities were included:      therapeutic activities to improve functional performance for   minutes, including:      gait training to improve functional mobility and safety for   minutes, including:      direct contact modalities after being cleared for contraindications:     supervised modalities after being " cleared for contradictions:     hot pack for  minutes to .    cold pack for  minutes to .        Patient Education and Home Exercises     Home Exercises Provided and Patient Education Provided     Education provided:   -supporting lumbar spine with sitting   -quad sets for knees     Written Home Exercises Provided:  Exercises were reviewed and HOMA was able to demonstrate them prior to the end of the session.  HOMA demonstrated good  understanding of the education provided. See EMR under Patient Instructions for exercises provided during therapy sessions    ASSESSMENT     Patient did well with exercises; no increased symptoms noted; general LE weakness noted.    HOMA Is progressing well towards her goals.   Pt prognosis is Good.     Pt will continue to benefit from skilled outpatient physical therapy to address the deficits listed in the problem list box on initial evaluation, provide pt/family education and to maximize pt's level of independence in the home and community environment.     Pt's spiritual, cultural and educational needs considered and pt agreeable to plan of care and goals.     Anticipated barriers to physical therapy: None    Goals:   Short Term Goals: 3 weeks  Pain: reduce max pain levels to no more than 5/10 in order to improve performance of daily activities   Demonstrate compliance with initial exercise program     Long Term Goals: 6 weeks     Pain: Decrease pain to no more than 3/10 to allow for increased standing, walking, and performance of daily activities.   Strength: Improve strength in bilateral LE's  to 5/5 for improved LE stability  Functional scale: Improve score on FOTO - balance  to 40% limitation   Lifting: Lift a bag of groceries from trunk and carry up steps into house without pain, compensation or LOB.   Walking: Increase walking distance to 1/4 mile without pain  Postures: Increase standing duration to > 20 mins without pain   Transfers: Perform sit to stand from chair transfers  without increased pain or limitation  Exercise: demonstrate independence with home exercise program to maintain gains made in therapy.         PLAN     Continue per POC and progress as tolerated to improve strength and mobility.     Sofía Martines, PT

## 2022-09-02 ENCOUNTER — CLINICAL SUPPORT (OUTPATIENT)
Dept: REHABILITATION | Facility: HOSPITAL | Age: 72
End: 2022-09-02
Payer: MEDICARE

## 2022-09-02 DIAGNOSIS — M25.561 CHRONIC PAIN OF BOTH KNEES: Primary | ICD-10-CM

## 2022-09-02 DIAGNOSIS — M25.562 CHRONIC PAIN OF BOTH KNEES: Primary | ICD-10-CM

## 2022-09-02 DIAGNOSIS — G89.29 CHRONIC PAIN OF BOTH KNEES: Primary | ICD-10-CM

## 2022-09-02 PROCEDURE — 97110 THERAPEUTIC EXERCISES: CPT | Mod: PN,CQ

## 2022-09-02 NOTE — PROGRESS NOTES
"[facility]   Physical Therapy Treatment Note     Name: Marleni Carpio Lars  Clinic Number: [id number]    Therapy Diagnosis:   Encounter Diagnosis   Name Primary?    Chronic pain of both knees Yes     Physician: Dread Mckinney MD    Visit Date: 9/2/2022    Physician Orders: PT Eval and Treat   Medical Diagnosis: primary OA bilateral knees   Evaluation Date: 8/10/2022  Authorization period Expiration: 12/31/2022  Plan of Care Certification Period: 11/5/2022     Visit #: 5/ Visits authorized: 12    PTA Visit #:  1/5    Time In: 10:15 AM  Time Out:  11:00 AM  Total Billable Time: 40 minutes    SUBJECTIVE     Pt reports: Knees are feeling better.   She was not compliant with home exercise program as one has not been issued yet.   Response to previous treatment: N/A  Functional change: N/A    Pain: 3-4/10   Location: bilateral knee      OBJECTIVE     Objective Measures updated at progress report unless specified.     Treatment     HOMA received the treatments listed below:      therapeutic exercises to develop strength, endurance, ROM and flexibility for 40 minutes including:  Recumbent Bike level 5 x 12 min  Heel Raises x 15  Toe Taps on 6" step x 2 min  Step-ups on 6" step x 10 ea  Standing Hip Flex/Abd/Ext x 10 ea  Sit to Stand x 10  Bridges x 10  Ball Squeezes x 2 min  QS x 2 min  SLR 10 x 2 ea  SAQ on small gray roll x 2 min      manual therapy techniques:  were applied to the:  for  minutes, including:      neuromuscular re-education activities to improve:  for  minutes. The following activities were included:      therapeutic activities to improve functional performance for   minutes, including:      gait training to improve functional mobility and safety for   minutes, including:      direct contact modalities after being cleared for contraindications:     supervised modalities after being cleared for contradictions:     hot pack for  minutes to .    cold pack for  minutes to " .        Patient Education and Home Exercises     Home Exercises Provided and Patient Education Provided     Education provided:   -supporting lumbar spine with sitting   -quad sets for knees     Written Home Exercises Provided:  Exercises were reviewed and HOMA was able to demonstrate them prior to the end of the session.  HOMA demonstrated good  understanding of the education provided. See EMR under Patient Instructions for exercises provided during therapy sessions    ASSESSMENT     Patient did well with exercises; no increased symptoms noted; general LE weakness noted.    HOMA Is progressing well towards her goals.   Pt prognosis is Good.     Pt will continue to benefit from skilled outpatient physical therapy to address the deficits listed in the problem list box on initial evaluation, provide pt/family education and to maximize pt's level of independence in the home and community environment.     Pt's spiritual, cultural and educational needs considered and pt agreeable to plan of care and goals.     Anticipated barriers to physical therapy: None    Goals:   Short Term Goals: 3 weeks  Pain: reduce max pain levels to no more than 5/10 in order to improve performance of daily activities   Demonstrate compliance with initial exercise program     Long Term Goals: 6 weeks     Pain: Decrease pain to no more than 3/10 to allow for increased standing, walking, and performance of daily activities.   Strength: Improve strength in bilateral LE's  to 5/5 for improved LE stability  Functional scale: Improve score on FOTO - balance  to 40% limitation   Lifting: Lift a bag of groceries from trunk and carry up steps into house without pain, compensation or LOB.   Walking: Increase walking distance to 1/4 mile without pain  Postures: Increase standing duration to > 20 mins without pain   Transfers: Perform sit to stand from chair transfers without increased pain or limitation  Exercise: demonstrate independence with home  exercise program to maintain gains made in therapy.         PLAN     Continue per POC and progress as tolerated to improve strength and mobility.     Jonathan Favre, PTA

## 2022-09-06 ENCOUNTER — CLINICAL SUPPORT (OUTPATIENT)
Dept: REHABILITATION | Facility: HOSPITAL | Age: 72
End: 2022-09-06
Payer: MEDICARE

## 2022-09-06 DIAGNOSIS — G89.29 CHRONIC PAIN OF BOTH KNEES: Primary | ICD-10-CM

## 2022-09-06 DIAGNOSIS — M25.562 CHRONIC PAIN OF BOTH KNEES: Primary | ICD-10-CM

## 2022-09-06 DIAGNOSIS — M25.561 CHRONIC PAIN OF BOTH KNEES: Primary | ICD-10-CM

## 2022-09-06 PROCEDURE — 97110 THERAPEUTIC EXERCISES: CPT | Mod: PN,CQ

## 2022-09-06 NOTE — PROGRESS NOTES
"[facility]   Physical Therapy Treatment Note     Name: Marleni Carpio Lars  Clinic Number: [id number]    Therapy Diagnosis:   Encounter Diagnosis   Name Primary?    Chronic pain of both knees Yes     Physician: Dread Mckinney MD    Visit Date: 9/6/2022    Physician Orders: PT Eval and Treat   Medical Diagnosis: primary OA bilateral knees   Evaluation Date: 8/10/2022  Authorization period Expiration: 12/31/2022  Plan of Care Certification Period: 11/5/2022     Visit #: 6/ Visits authorized: 12    PTA Visit #:  2/5    Time In: 12:15 PM  Time Out: 1:00 PM  Total Billable Time: 40 minutes    SUBJECTIVE     Pt reports: Knees are feeling better.   She was not compliant with home exercise program as one has not been issued yet.   Response to previous treatment: N/A  Functional change: N/A    Pain: 3/10   Location: bilateral knee      OBJECTIVE     Objective Measures updated at progress report unless specified.     Treatment     HOMA received the treatments listed below:      therapeutic exercises to develop strength, endurance, ROM and flexibility for 40 minutes including:  Recumbent Bike level 5 x 15 min  Heel Raises x 20  Toe Taps on 6" step x 2 min  Step-ups on 6" step x 10 ea  Standing Hip Flex/Abd/Ext x 10 ea  Sit to Stand x 10  Bridges x 10  Ball Squeezes x 2 min  QS x 2 min  SLR 10 x 2 ea  SAQ on small gray roll x 2 min      manual therapy techniques:  were applied to the:  for  minutes, including:      neuromuscular re-education activities to improve:  for  minutes. The following activities were included:      therapeutic activities to improve functional performance for   minutes, including:      gait training to improve functional mobility and safety for   minutes, including:      direct contact modalities after being cleared for contraindications:     supervised modalities after being cleared for contradictions:     hot pack for  minutes to .    cold pack for  minutes to " .        Patient Education and Home Exercises     Home Exercises Provided and Patient Education Provided     Education provided:   -supporting lumbar spine with sitting   -quad sets for knees     Written Home Exercises Provided:  Exercises were reviewed and HOMA was able to demonstrate them prior to the end of the session.  HOMA demonstrated good  understanding of the education provided. See EMR under Patient Instructions for exercises provided during therapy sessions    ASSESSMENT     Patient did well with exercises; no increased symptoms noted; general LE weakness noted.    HOMA Is progressing well towards her goals.   Pt prognosis is Good.     Pt will continue to benefit from skilled outpatient physical therapy to address the deficits listed in the problem list box on initial evaluation, provide pt/family education and to maximize pt's level of independence in the home and community environment.     Pt's spiritual, cultural and educational needs considered and pt agreeable to plan of care and goals.     Anticipated barriers to physical therapy: None    Goals:   Short Term Goals: 3 weeks  Pain: reduce max pain levels to no more than 5/10 in order to improve performance of daily activities   Demonstrate compliance with initial exercise program     Long Term Goals: 6 weeks     Pain: Decrease pain to no more than 3/10 to allow for increased standing, walking, and performance of daily activities.   Strength: Improve strength in bilateral LE's  to 5/5 for improved LE stability  Functional scale: Improve score on FOTO - balance  to 40% limitation   Lifting: Lift a bag of groceries from trunk and carry up steps into house without pain, compensation or LOB.   Walking: Increase walking distance to 1/4 mile without pain  Postures: Increase standing duration to > 20 mins without pain   Transfers: Perform sit to stand from chair transfers without increased pain or limitation  Exercise: demonstrate independence with home  exercise program to maintain gains made in therapy.         PLAN     Continue per POC and progress as tolerated to improve strength and mobility.     Jonathan Favre, PTA

## 2022-09-12 ENCOUNTER — CLINICAL SUPPORT (OUTPATIENT)
Dept: REHABILITATION | Facility: HOSPITAL | Age: 72
End: 2022-09-12
Payer: MEDICARE

## 2022-09-12 DIAGNOSIS — G89.29 CHRONIC PAIN OF BOTH KNEES: Primary | ICD-10-CM

## 2022-09-12 DIAGNOSIS — M25.562 CHRONIC PAIN OF BOTH KNEES: Primary | ICD-10-CM

## 2022-09-12 DIAGNOSIS — M25.561 CHRONIC PAIN OF BOTH KNEES: Primary | ICD-10-CM

## 2022-09-12 PROCEDURE — 97110 THERAPEUTIC EXERCISES: CPT | Mod: PN,CQ

## 2022-09-12 NOTE — PROGRESS NOTES
"[facility]   Physical Therapy Treatment Note     Name: Marleni Carpio Lars  Clinic Number: [id number]    Therapy Diagnosis:   Encounter Diagnosis   Name Primary?    Chronic pain of both knees Yes     Physician: Dread Mckinney MD    Visit Date: 9/12/2022    Physician Orders: PT Eval and Treat   Medical Diagnosis: primary OA bilateral knees   Evaluation Date: 8/10/2022  Authorization period Expiration: 12/31/2022  Plan of Care Certification Period: 11/5/2022     Visit #: 7 / Visits authorized: 12    PTA Visit #:  3/5    Time In: 9:35 AM  Time Out: 10:20 AM  Total Billable Time: 40 minutes    SUBJECTIVE     Pt reports: My knees were sore this weekend.   She was not compliant with home exercise program as one has not been issued yet.   Response to previous treatment: N/A  Functional change: N/A    Pain: 5/10   Location: bilateral knee      OBJECTIVE     Objective Measures updated at progress report unless specified.     Treatment     HOMA received the treatments listed below:      therapeutic exercises to develop strength, endurance, ROM and flexibility for 40 minutes including:  Recumbent Bike level 5 x 15 min  Heel Raises x 20  Toe Taps on 6" step x 2 min  Step-ups on 6" step x 10 ea  Standing Hip Flex/Abd/Ext x 10 ea  Sit to Stand x 10  Bridges x 10  Ball Squeezes x 2 min  QS x 2 min  SLR 10 x 2 ea  SAQ on small gray roll x 2 min      manual therapy techniques:  were applied to the:  for  minutes, including:      neuromuscular re-education activities to improve:  for  minutes. The following activities were included:      therapeutic activities to improve functional performance for   minutes, including:      gait training to improve functional mobility and safety for   minutes, including:      direct contact modalities after being cleared for contraindications:     supervised modalities after being cleared for contradictions:     hot pack for  minutes to .    cold pack for  minutes to " .        Patient Education and Home Exercises     Home Exercises Provided and Patient Education Provided     Education provided:   -supporting lumbar spine with sitting   -quad sets for knees     Written Home Exercises Provided:  Exercises were reviewed and HOMA was able to demonstrate them prior to the end of the session.  HOMA demonstrated good  understanding of the education provided. See EMR under Patient Instructions for exercises provided during therapy sessions    ASSESSMENT     Patient did well with exercises; no increased symptoms noted; making progress with LE strength.    HOMA Is progressing well towards her goals.   Pt prognosis is Good.     Pt will continue to benefit from skilled outpatient physical therapy to address the deficits listed in the problem list box on initial evaluation, provide pt/family education and to maximize pt's level of independence in the home and community environment.     Pt's spiritual, cultural and educational needs considered and pt agreeable to plan of care and goals.     Anticipated barriers to physical therapy: None    Goals:   Short Term Goals: 3 weeks  Pain: reduce max pain levels to no more than 5/10 in order to improve performance of daily activities   Demonstrate compliance with initial exercise program     Long Term Goals: 6 weeks     Pain: Decrease pain to no more than 3/10 to allow for increased standing, walking, and performance of daily activities.   Strength: Improve strength in bilateral LE's  to 5/5 for improved LE stability  Functional scale: Improve score on FOTO - balance  to 40% limitation   Lifting: Lift a bag of groceries from trunk and carry up steps into house without pain, compensation or LOB.   Walking: Increase walking distance to 1/4 mile without pain  Postures: Increase standing duration to > 20 mins without pain   Transfers: Perform sit to stand from chair transfers without increased pain or limitation  Exercise: demonstrate independence with  home exercise program to maintain gains made in therapy.         PLAN     Continue per POC and progress as tolerated to improve strength and mobility.     Jonathan Favre, PTA

## 2022-09-15 ENCOUNTER — CLINICAL SUPPORT (OUTPATIENT)
Dept: REHABILITATION | Facility: HOSPITAL | Age: 72
End: 2022-09-15
Payer: MEDICARE

## 2022-09-15 DIAGNOSIS — M25.561 CHRONIC PAIN OF BOTH KNEES: Primary | ICD-10-CM

## 2022-09-15 DIAGNOSIS — G89.29 CHRONIC PAIN OF BOTH KNEES: Primary | ICD-10-CM

## 2022-09-15 DIAGNOSIS — M25.562 CHRONIC PAIN OF BOTH KNEES: Primary | ICD-10-CM

## 2022-09-15 PROCEDURE — 97110 THERAPEUTIC EXERCISES: CPT | Mod: PN

## 2022-09-15 NOTE — PROGRESS NOTES
"[facility]   Physical Therapy Treatment Note     Name: Marleni Sousa  Clinic Number: [id number]    Therapy Diagnosis:   Encounter Diagnosis   Name Primary?    Chronic pain of both knees Yes       Physician: Dread Mckinney MD    Visit Date: 9/15/2022    Physician Orders: PT Eval and Treat   Medical Diagnosis: primary OA bilateral knees   Evaluation Date: 8/10/2022  Authorization period Expiration: 12/31/2022  Plan of Care Certification Period: 11/5/2022     Visit #: 8 / Visits authorized: 12    PTA Visit #:  0/5    Time In: 9:30 AM  Time Out: 10:20 AM  Total Billable Time: 40 minutes    SUBJECTIVE     Pt reports: feeling pretty good today  She was not compliant with home exercise program as one has not been issued yet.   Response to previous treatment: N/A  Functional change: N/A    Pain: 3/10   Location: bilateral knee      OBJECTIVE     Objective Measures updated at progress report unless specified.     Treatment     HOMA received the treatments listed below:      therapeutic exercises to develop strength, endurance, ROM and flexibility for 40 minutes including:  Recumbent Bike level 5 x 15 min  Heel Raises x 20  Toe Taps on 6" step x 2 min  Step-ups on 6" step x 10 ea  Standing Hip Flex/Abd/Ext x 10 ea from 6" step  Side steps x-lt band 4 laps in // bars  Sit to Stand with green thera-band at knees x 15  Bridges with green thera-band at knees x 15  Ball Squeezes x 2 min  SLR 10 x 2 ea  SAQ on small gray roll x 2 min (attempted 2# ankle weight but unable on R knee)        Patient Education and Home Exercises     Home Exercises Provided and Patient Education Provided     Education provided:   -supporting lumbar spine with sitting   -quad sets for knees     Written Home Exercises Provided:  Exercises were reviewed and HOMA was able to demonstrate them prior to the end of the session.  HOMA demonstrated good  understanding of the education provided. See EMR under Patient " Instructions for exercises provided during therapy sessions    ASSESSMENT     Included green thera-band at knees with squat and bridges for cuing to activate glueals and avoid genu valgus. Patient with good tolerance to treatment this date. Able to progress without complaint     HOMA Is progressing well towards her goals.   Pt prognosis is Good.     Pt will continue to benefit from skilled outpatient physical therapy to address the deficits listed in the problem list box on initial evaluation, provide pt/family education and to maximize pt's level of independence in the home and community environment.     Pt's spiritual, cultural and educational needs considered and pt agreeable to plan of care and goals.     Anticipated barriers to physical therapy: None    Goals:   Short Term Goals: 3 weeks  Pain: reduce max pain levels to no more than 5/10 in order to improve performance of daily activities   Demonstrate compliance with initial exercise program     Long Term Goals: 6 weeks     Pain: Decrease pain to no more than 3/10 to allow for increased standing, walking, and performance of daily activities.   Strength: Improve strength in bilateral LE's  to 5/5 for improved LE stability  Functional scale: Improve score on FOTO - balance  to 40% limitation   Lifting: Lift a bag of groceries from trunk and carry up steps into house without pain, compensation or LOB.   Walking: Increase walking distance to 1/4 mile without pain  Postures: Increase standing duration to > 20 mins without pain   Transfers: Perform sit to stand from chair transfers without increased pain or limitation  Exercise: demonstrate independence with home exercise program to maintain gains made in therapy.         PLAN     Continue per POC and progress as tolerated to improve strength and mobility.     Berta Houston, PT

## 2022-09-19 ENCOUNTER — CLINICAL SUPPORT (OUTPATIENT)
Dept: REHABILITATION | Facility: HOSPITAL | Age: 72
End: 2022-09-19
Payer: MEDICARE

## 2022-09-19 DIAGNOSIS — G89.29 CHRONIC PAIN OF BOTH KNEES: Primary | ICD-10-CM

## 2022-09-19 DIAGNOSIS — M25.562 CHRONIC PAIN OF BOTH KNEES: Primary | ICD-10-CM

## 2022-09-19 DIAGNOSIS — M25.561 CHRONIC PAIN OF BOTH KNEES: Primary | ICD-10-CM

## 2022-09-19 PROCEDURE — 97110 THERAPEUTIC EXERCISES: CPT | Mod: PN,CQ

## 2022-09-19 NOTE — PROGRESS NOTES
"[facility]   Physical Therapy Treatment Note     Name: Marleni Sousa  Clinic Number: [id number]    Therapy Diagnosis:   Encounter Diagnosis   Name Primary?    Chronic pain of both knees Yes       Physician: Dread Mckinney MD    Visit Date: 9/19/2022    Physician Orders: PT Eval and Treat   Medical Diagnosis: primary OA bilateral knees   Evaluation Date: 8/10/2022  Authorization period Expiration: 12/31/2022  Plan of Care Certification Period: 11/5/2022     Visit #: 9 / Visits authorized: 12    PTA Visit #:  1/5    Time In: 9:30 AM  Time Out: 10:25 AM  Total Billable Time: 40 minutes    SUBJECTIVE     Pt reports: No new c/o's.   She was not compliant with home exercise program as one has not been issued yet.   Response to previous treatment: N/A  Functional change: N/A    Pain: 4/10   Location: bilateral knee      OBJECTIVE     Objective Measures updated at progress report unless specified.     Treatment     HOMA received the treatments listed below:      therapeutic exercises to develop strength, endurance, ROM and flexibility for 40 minutes including:  Recumbent Bike level 5 x 15 min  Heel Raises x 20  Toe Taps on 6" step x 2 min  Step-ups on 6" step x 10 ea  Standing Hip Flex/Abd/Ext x 10 ea from 6" step  Side steps x-lt band x 2 min in // bars  Sit to Stand with green thera-band at knees x 15  Bridges with green thera-band at knees x 15  Ball Squeezes x 2 min  SLR 10 x 2 ea  SAQ on small gray roll x 2 min (attempted 2# ankle weight but unable on R knee)        Patient Education and Home Exercises     Home Exercises Provided and Patient Education Provided     Education provided:   -supporting lumbar spine with sitting   -quad sets for knees     Written Home Exercises Provided:  Exercises were reviewed and HOMA was able to demonstrate them prior to the end of the session.  HOMA demonstrated good  understanding of the education provided. See EMR under Patient Instructions for " exercises provided during therapy sessions    ASSESSMENT     Patient with good tolerance to treatment this date. Able to progress without complaint     HOMA Is progressing well towards her goals.   Pt prognosis is Good.     Pt will continue to benefit from skilled outpatient physical therapy to address the deficits listed in the problem list box on initial evaluation, provide pt/family education and to maximize pt's level of independence in the home and community environment.     Pt's spiritual, cultural and educational needs considered and pt agreeable to plan of care and goals.     Anticipated barriers to physical therapy: None    Goals:   Short Term Goals: 3 weeks  Pain: reduce max pain levels to no more than 5/10 in order to improve performance of daily activities   Demonstrate compliance with initial exercise program     Long Term Goals: 6 weeks     Pain: Decrease pain to no more than 3/10 to allow for increased standing, walking, and performance of daily activities.   Strength: Improve strength in bilateral LE's  to 5/5 for improved LE stability  Functional scale: Improve score on FOTO - balance  to 40% limitation   Lifting: Lift a bag of groceries from trunk and carry up steps into house without pain, compensation or LOB.   Walking: Increase walking distance to 1/4 mile without pain  Postures: Increase standing duration to > 20 mins without pain   Transfers: Perform sit to stand from chair transfers without increased pain or limitation  Exercise: demonstrate independence with home exercise program to maintain gains made in therapy.         PLAN     Continue per POC and progress as tolerated to improve strength and mobility.     Jonathan Favre, PTA

## 2022-09-29 ENCOUNTER — CLINICAL SUPPORT (OUTPATIENT)
Dept: REHABILITATION | Facility: HOSPITAL | Age: 72
End: 2022-09-29
Payer: MEDICARE

## 2022-09-29 DIAGNOSIS — M25.561 CHRONIC PAIN OF BOTH KNEES: Primary | ICD-10-CM

## 2022-09-29 DIAGNOSIS — G89.29 CHRONIC PAIN OF BOTH KNEES: Primary | ICD-10-CM

## 2022-09-29 DIAGNOSIS — M25.562 CHRONIC PAIN OF BOTH KNEES: Primary | ICD-10-CM

## 2022-09-29 PROCEDURE — 97110 THERAPEUTIC EXERCISES: CPT | Mod: PN

## 2022-09-29 NOTE — PROGRESS NOTES
"[facility]   Physical Therapy Treatment Note     Name: Marleni Sousa  Clinic Number: [id number]    Therapy Diagnosis:   Encounter Diagnosis   Name Primary?    Chronic pain of both knees Yes       Physician: Dread Mckinney MD    Visit Date: 9/29/2022    Physician Orders: PT Eval and Treat   Medical Diagnosis: primary OA bilateral knees   Evaluation Date: 8/10/2022  Authorization period Expiration: 12/31/2022  Plan of Care Certification Period: 11/5/2022     Visit #: 9 / Visits authorized: 12    PTA Visit #:  1/5    Time In: 10:05 AM  Time Out: 10:45 AM  Total Billable Time: 40 minutes    SUBJECTIVE     Pt reports: No new c/o's. Sorry she missed her last appointment, had Strep Throat - finally feeling better.   She was not compliant with home exercise program as one has not been issued yet.   Response to previous treatment: N/A  Functional change: N/A    Pain: 4/10   Location: bilateral knee      OBJECTIVE     Objective Measures updated at progress report unless specified.     Treatment     HOMA received the treatments listed below:      therapeutic exercises to develop strength, endurance, ROM and flexibility for 40 minutes including:  Recumbent Bike level 5 x 10 min   Heel Raises x 20  Toe Taps on 6" step x 2 min  Step-ups on 6" step x 10 ea  Standing Hip Flex/Abd/Ext x 10 ea from 6" step  Side steps x-lt band x 2 min in // bars  Sit to Stand with green thera-band at knees x 15  Bridges with green thera-band at knees x 15  Ball Squeezes x 2 min  SLR 10 x 2 ea  SAQ on small gray roll x 2 min (attempted 2# ankle weight but unable on R knee)        Patient Education and Home Exercises     Home Exercises Provided and Patient Education Provided     Education provided:   -supporting lumbar spine with sitting   -quad sets for knees     Written Home Exercises Provided:  Exercises were reviewed and HOMA was able to demonstrate them prior to the end of the session.  HOMA demonstrated good  " understanding of the education provided. See EMR under Patient Instructions for exercises provided during therapy sessions    ASSESSMENT     Patient with good tolerance to treatment this date. Able to progress without complaint, strength in LE's improving.     HOMA Is progressing well towards her goals.   Pt prognosis is Good.     Pt will continue to benefit from skilled outpatient physical therapy to address the deficits listed in the problem list box on initial evaluation, provide pt/family education and to maximize pt's level of independence in the home and community environment.     Pt's spiritual, cultural and educational needs considered and pt agreeable to plan of care and goals.     Anticipated barriers to physical therapy: None    Goals:   Short Term Goals: 3 weeks  Pain: reduce max pain levels to no more than 5/10 in order to improve performance of daily activities   Demonstrate compliance with initial exercise program     Long Term Goals: 6 weeks     Pain: Decrease pain to no more than 3/10 to allow for increased standing, walking, and performance of daily activities.   Strength: Improve strength in bilateral LE's  to 5/5 for improved LE stability  Functional scale: Improve score on FOTO - balance  to 40% limitation   Lifting: Lift a bag of groceries from trunk and carry up steps into house without pain, compensation or LOB.   Walking: Increase walking distance to 1/4 mile without pain  Postures: Increase standing duration to > 20 mins without pain   Transfers: Perform sit to stand from chair transfers without increased pain or limitation  Exercise: demonstrate independence with home exercise program to maintain gains made in therapy.         PLAN     Continue per POC and progress as tolerated to improve strength and mobility.     Sofía Martines, PT

## 2022-10-04 ENCOUNTER — CLINICAL SUPPORT (OUTPATIENT)
Dept: REHABILITATION | Facility: HOSPITAL | Age: 72
End: 2022-10-04
Payer: MEDICARE

## 2022-10-04 DIAGNOSIS — M25.562 CHRONIC PAIN OF BOTH KNEES: Primary | ICD-10-CM

## 2022-10-04 DIAGNOSIS — M25.561 CHRONIC PAIN OF BOTH KNEES: Primary | ICD-10-CM

## 2022-10-04 DIAGNOSIS — G89.29 CHRONIC PAIN OF BOTH KNEES: Primary | ICD-10-CM

## 2022-10-04 PROCEDURE — 97110 THERAPEUTIC EXERCISES: CPT | Mod: PN

## 2022-10-04 NOTE — PROGRESS NOTES
"[facility]   Physical Therapy Treatment Note     Name: Marleni Sousa  Clinic Number: [id number]    Therapy Diagnosis:   No diagnosis found.      Physician: Dread Mckinney MD    Visit Date: 10/4/2022    Physician Orders: PT Eval and Treat   Medical Diagnosis: primary OA bilateral knees   Evaluation Date: 8/10/2022  Authorization period Expiration: 12/31/2022  Plan of Care Certification Period: 11/5/2022     Visit #: 10 / Visits authorized: 12    PTA Visit #:  0/5    Time In: 10:15 AM  Time Out: 11:00 AM  Total Billable Time: 40 minutes    SUBJECTIVE     Pt reports: No new c/o's, feels like it is helping her.  She was not compliant with home exercise program as one has not been issued yet.   Response to previous treatment: good  Functional change: improving balance    Pain: 4/10   Location: bilateral knee      OBJECTIVE     Objective Measures updated at progress report unless specified.     Treatment     HOMA received the treatments listed below:      therapeutic exercises to develop strength, endurance, ROM and flexibility for 40 minutes including:  Recumbent Bike level 5 x 10 min   Heel Raises x 20  Toe Taps on 6" step x 2 min  Step-ups on 6" step x 10 ea  Standing Hip Flex/Abd/Ext x 10 ea from 6" step  Side steps x-lt band x 2 min in // bars  Sit to Stand with green thera-band at knees x 15  Bridges with green thera-band at knees x 15  Ball Squeezes x 2 min  SLR with 1.5# 10 x 2 ea  SAQ on small gray roll with 1.5# x 2 min         Patient Education and Home Exercises     Home Exercises Provided and Patient Education Provided     Education provided:   -supporting lumbar spine with sitting   -quad sets for knees     Written Home Exercises Provided:  Exercises were reviewed and HOMA was able to demonstrate them prior to the end of the session.  HOMA demonstrated good  understanding of the education provided. See EMR under Patient Instructions for exercises provided during therapy " sessions    ASSESSMENT     Patient with good tolerance to treatment this date. Able to progress to 1.5# on SAQ's without complication or increase pain reported. HOMA Is progressing well towards her goals.     Pt prognosis is Good.     Pt will continue to benefit from skilled outpatient physical therapy to address the deficits listed in the problem list box on initial evaluation, provide pt/family education and to maximize pt's level of independence in the home and community environment.     Pt's spiritual, cultural and educational needs considered and pt agreeable to plan of care and goals.     Anticipated barriers to physical therapy: None    Goals:   Short Term Goals: 3 weeks  Pain: reduce max pain levels to no more than 5/10 in order to improve performance of daily activities   Demonstrate compliance with initial exercise program     Long Term Goals: 6 weeks     Pain: Decrease pain to no more than 3/10 to allow for increased standing, walking, and performance of daily activities.   Strength: Improve strength in bilateral LE's  to 5/5 for improved LE stability  Functional scale: Improve score on FOTO - balance  to 40% limitation   Lifting: Lift a bag of groceries from trunk and carry up steps into house without pain, compensation or LOB.   Walking: Increase walking distance to 1/4 mile without pain  Postures: Increase standing duration to > 20 mins without pain   Transfers: Perform sit to stand from chair transfers without increased pain or limitation  Exercise: demonstrate independence with home exercise program to maintain gains made in therapy.         PLAN     Continue per POC and progress as tolerated to improve strength and mobility.     Sergey Hummel, PT

## 2022-10-06 ENCOUNTER — CLINICAL SUPPORT (OUTPATIENT)
Dept: REHABILITATION | Facility: HOSPITAL | Age: 72
End: 2022-10-06
Payer: MEDICARE

## 2022-10-06 DIAGNOSIS — M25.562 CHRONIC PAIN OF BOTH KNEES: Primary | ICD-10-CM

## 2022-10-06 DIAGNOSIS — G89.29 CHRONIC PAIN OF BOTH KNEES: Primary | ICD-10-CM

## 2022-10-06 DIAGNOSIS — M25.561 CHRONIC PAIN OF BOTH KNEES: Primary | ICD-10-CM

## 2022-10-06 PROCEDURE — 97110 THERAPEUTIC EXERCISES: CPT | Mod: PN

## 2022-10-11 ENCOUNTER — CLINICAL SUPPORT (OUTPATIENT)
Dept: REHABILITATION | Facility: HOSPITAL | Age: 72
End: 2022-10-11
Payer: MEDICARE

## 2022-10-11 DIAGNOSIS — M25.562 CHRONIC PAIN OF BOTH KNEES: Primary | ICD-10-CM

## 2022-10-11 DIAGNOSIS — G89.29 CHRONIC PAIN OF BOTH KNEES: Primary | ICD-10-CM

## 2022-10-11 DIAGNOSIS — M25.561 CHRONIC PAIN OF BOTH KNEES: Primary | ICD-10-CM

## 2022-10-11 PROCEDURE — 97110 THERAPEUTIC EXERCISES: CPT | Mod: PN,CQ

## 2022-10-11 NOTE — PROGRESS NOTES
"[facility]   Physical Therapy Treatment Note     Name: Marleni Carpio Lars  Clinic Number: [id number]    Therapy Diagnosis:   Encounter Diagnosis   Name Primary?    Chronic pain of both knees Yes         Physician: Dread Mckinney MD    Visit Date: 10/11/2022    Physician Orders: PT Eval and Treat   Medical Diagnosis: primary OA bilateral knees   Evaluation Date: 8/10/2022  Authorization period Expiration: 12/31/2022  Plan of Care Certification Period: 11/5/2022     Visit #: 12 / Visits authorized: 24    PTA Visit #:  1/5    Time In: 10:30 am  Time Out: 11:15 am  Total Billable Time: 45 minutes    SUBJECTIVE     Pt reports: her left knee is doing better than her right knee, even her back is doing better since starting physical therapy.  She was not compliant with home exercise program as one has not been issued yet.   Response to previous treatment: good  Functional change: improving balance    Pain: 2-3/10   Location: bilateral knee      OBJECTIVE     Objective Measures updated at progress report unless specified.     Treatment     HOMA received the treatments listed below:      therapeutic exercises to develop strength, endurance, ROM and flexibility for 45 minutes including:  Recumbent Bike level 5 x 15 min   Heel Raises x 20  Toe Taps on 6" step x 2 min  Step-ups on 6" step x 10 ea  Standing Hip Flex/Abd/Ext x 10 ea from 6" step  Side steps x-lt band x 2 min in // bars  Sit to Stand with green thera-band at knees x 15  Bridges with green thera-band at knees x 15  Ball Squeezes x 2 min  SLR with 1.5# 10 x 2 ea  SAQ on small gray roll with 1.5# x 2 min       Patient Education and Home Exercises     Home Exercises Provided and Patient Education Provided     Education provided:   -supporting lumbar spine with sitting   -quad sets for knees     Written Home Exercises Provided:  Exercises were reviewed and HOMA was able to demonstrate them prior to the end of the session.  HOMA " demonstrated good  understanding of the education provided. See EMR under Patient Instructions for exercises provided during therapy sessions    ASSESSMENT     Patient tolerated treatment without complication or increase pain reported. HOMA Is progressing well towards her goals. Will reassess progress towards goals at next visit.    Pt prognosis is Good.     Pt will continue to benefit from skilled outpatient physical therapy to address the deficits listed in the problem list box on initial evaluation, provide pt/family education and to maximize pt's level of independence in the home and community environment.     Pt's spiritual, cultural and educational needs considered and pt agreeable to plan of care and goals.     Anticipated barriers to physical therapy: None    Goals:   Short Term Goals: 3 weeks Goals met  Pain: reduce max pain levels to no more than 5/10 in order to improve performance of daily activities   Demonstrate compliance with initial exercise program     Long Term Goals: 6 weeks     Pain: Decrease pain to no more than 3/10 to allow for increased standing, walking, and performance of daily activities.   Strength: Improve strength in bilateral LE's  to 5/5 for improved LE stability  Functional scale: Improve score on FOTO - balance  to 40% limitation   Lifting: Lift a bag of groceries from trunk and carry up steps into house without pain, compensation or LOB.   Walking: Increase walking distance to 1/4 mile without pain  Postures: Increase standing duration to > 20 mins without pain   Transfers: Perform sit to stand from chair transfers without increased pain or limitation  Exercise: demonstrate independence with home exercise program to maintain gains made in therapy.         PLAN     Continue per POC and progress as tolerated to improve strength and mobility.     Timothy Gaspar, PTA

## 2022-10-13 ENCOUNTER — CLINICAL SUPPORT (OUTPATIENT)
Dept: REHABILITATION | Facility: HOSPITAL | Age: 72
End: 2022-10-13
Payer: MEDICARE

## 2022-10-13 DIAGNOSIS — G89.29 CHRONIC PAIN OF BOTH KNEES: Primary | ICD-10-CM

## 2022-10-13 DIAGNOSIS — M25.561 CHRONIC PAIN OF BOTH KNEES: Primary | ICD-10-CM

## 2022-10-13 DIAGNOSIS — M25.562 CHRONIC PAIN OF BOTH KNEES: Primary | ICD-10-CM

## 2022-10-13 PROCEDURE — 97110 THERAPEUTIC EXERCISES: CPT | Mod: PN,CQ

## 2022-10-13 NOTE — PROGRESS NOTES
"[facility]   Physical Therapy Treatment Note     Name: Marleni Sousa  Clinic Number: [id number]    Therapy Diagnosis:   Encounter Diagnosis   Name Primary?    Chronic pain of both knees Yes         Physician: Dread Mckinney MD    Visit Date: 10/13/2022    Physician Orders: PT Eval and Treat   Medical Diagnosis: primary OA bilateral knees   Evaluation Date: 8/10/2022  Authorization period Expiration: 12/31/2022  Plan of Care Certification Period: 11/5/2022     Visit #: 14 / Visits authorized: 24    PTA Visit #:  2/5    Time In: 9:20 AM  Time Out: 10:10 AM  Total Billable Time: 45 minutes    SUBJECTIVE     Pt reports: No new c/o's.  She was not compliant with home exercise program as one has not been issued yet.   Response to previous treatment: good  Functional change: improving balance    Pain: 2-3/10   Location: bilateral knee      OBJECTIVE     Objective Measures updated at progress report unless specified.     Treatment     HOMA received the treatments listed below:      therapeutic exercises to develop strength, endurance, ROM and flexibility for 45 minutes including:  Recumbent Bike level 5 x 15 min   Heel Raises x 20  Toe Taps on 6" step x 2 min  Step-ups on 6" step x 10 ea  Standing Hip Flex/Abd/Ext x 10 ea from 6" step  Side steps x-lt band x 2 min in // bars  Sit to Stand with green thera-band at knees x 15  Bridges with green thera-band at knees x 15  Ball Squeezes x 2 min  SLR with 1.5# 10 x 2 ea  SAQ on small gray roll with 1.5# x 2 min       Patient Education and Home Exercises     Home Exercises Provided and Patient Education Provided     Education provided:   -supporting lumbar spine with sitting   -quad sets for knees     Written Home Exercises Provided:  Exercises were reviewed and HOMA was able to demonstrate them prior to the end of the session.  HOMA demonstrated good  understanding of the education provided. See EMR under Patient Instructions for exercises " provided during therapy sessions    ASSESSMENT     Patient tolerated treatment without complication or increase pain reported. HOMA Is progressing well towards her goals. Will reassess progress towards goals at next visit.    Pt prognosis is Good.     Pt will continue to benefit from skilled outpatient physical therapy to address the deficits listed in the problem list box on initial evaluation, provide pt/family education and to maximize pt's level of independence in the home and community environment.     Pt's spiritual, cultural and educational needs considered and pt agreeable to plan of care and goals.     Anticipated barriers to physical therapy: None    Goals:   Short Term Goals: 3 weeks Goals met  Pain: reduce max pain levels to no more than 5/10 in order to improve performance of daily activities   Demonstrate compliance with initial exercise program     Long Term Goals: 6 weeks     Pain: Decrease pain to no more than 3/10 to allow for increased standing, walking, and performance of daily activities.   Strength: Improve strength in bilateral LE's  to 5/5 for improved LE stability  Functional scale: Improve score on FOTO - balance  to 40% limitation   Lifting: Lift a bag of groceries from trunk and carry up steps into house without pain, compensation or LOB.   Walking: Increase walking distance to 1/4 mile without pain  Postures: Increase standing duration to > 20 mins without pain   Transfers: Perform sit to stand from chair transfers without increased pain or limitation  Exercise: demonstrate independence with home exercise program to maintain gains made in therapy.         PLAN     Continue per POC and progress with strengthening/mobility exercises; decrease to 1x/wk.    Jonathan Favre, PTA

## 2022-10-19 ENCOUNTER — CLINICAL SUPPORT (OUTPATIENT)
Dept: REHABILITATION | Facility: HOSPITAL | Age: 72
End: 2022-10-19
Payer: MEDICARE

## 2022-10-19 DIAGNOSIS — M25.562 CHRONIC PAIN OF BOTH KNEES: Primary | ICD-10-CM

## 2022-10-19 DIAGNOSIS — G89.29 CHRONIC PAIN OF BOTH KNEES: Primary | ICD-10-CM

## 2022-10-19 DIAGNOSIS — M25.561 CHRONIC PAIN OF BOTH KNEES: Primary | ICD-10-CM

## 2022-10-19 PROCEDURE — 97110 THERAPEUTIC EXERCISES: CPT | Mod: PN,CQ

## 2022-10-19 NOTE — PROGRESS NOTES
"[facility]   Physical Therapy Treatment Note     Name: Marleni Sousa  Clinic Number: [id number]    Therapy Diagnosis:   Encounter Diagnosis   Name Primary?    Chronic pain of both knees Yes         Physician: Dread Mckinney MD    Visit Date: 10/19/2022    Physician Orders: PT Eval and Treat   Medical Diagnosis: primary OA bilateral knees   Evaluation Date: 8/10/2022  Authorization period Expiration: 12/31/2022  Plan of Care Certification Period: 11/5/2022     Visit #: 15 / Visits authorized: 24    PTA Visit #:  3/5    Time In: 8:45 AM  Time Out: 9:30 AM  Total Billable Time: 45 minutes    SUBJECTIVE     Pt reports: No new c/o's.  She was not compliant with home exercise program as one has not been issued yet.   Response to previous treatment: good  Functional change: improving balance    Pain: 2-3/10   Location: bilateral knee      OBJECTIVE     Objective Measures updated at progress report unless specified.     Treatment     HOMA received the treatments listed below:      therapeutic exercises to develop strength, endurance, ROM and flexibility for 45 minutes including:  Recumbent Bike level 5 x 12 min   Heel Raises x 20  Toe Taps on 6" step x 2 min  Step-ups on 6" step x 10 ea  Standing Hip Flex/Abd/Ext x 10 ea from 6" step  Side steps x-lt band x 2 min in // bars  Sit to Stand with green thera-band at knees x 15  Bridges with green thera-band at knees x 15  Ball Squeezes x 2 min  SLR with 1.5# 10 x 2 ea  SAQ on small gray roll with 1.5# x 2 min       Patient Education and Home Exercises     Home Exercises Provided and Patient Education Provided     Education provided:   -supporting lumbar spine with sitting   -quad sets for knees     Written Home Exercises Provided:  Exercises were reviewed and HOMA was able to demonstrate them prior to the end of the session.  HOMA demonstrated good  understanding of the education provided. See EMR under Patient Instructions for exercises " provided during therapy sessions    ASSESSMENT     Patient tolerated treatment without complication or increase pain reported. HOMA Is progressing well towards her goals. Will reassess progress towards goals at next visit.    Pt prognosis is Good.     Pt will continue to benefit from skilled outpatient physical therapy to address the deficits listed in the problem list box on initial evaluation, provide pt/family education and to maximize pt's level of independence in the home and community environment.     Pt's spiritual, cultural and educational needs considered and pt agreeable to plan of care and goals.     Anticipated barriers to physical therapy: None    Goals:   Short Term Goals: 3 weeks Goals met  Pain: reduce max pain levels to no more than 5/10 in order to improve performance of daily activities   Demonstrate compliance with initial exercise program     Long Term Goals: 6 weeks     Pain: Decrease pain to no more than 3/10 to allow for increased standing, walking, and performance of daily activities.   Strength: Improve strength in bilateral LE's  to 5/5 for improved LE stability  Functional scale: Improve score on FOTO - balance  to 40% limitation   Lifting: Lift a bag of groceries from trunk and carry up steps into house without pain, compensation or LOB.   Walking: Increase walking distance to 1/4 mile without pain  Postures: Increase standing duration to > 20 mins without pain   Transfers: Perform sit to stand from chair transfers without increased pain or limitation  Exercise: demonstrate independence with home exercise program to maintain gains made in therapy.         PLAN     Continue per POC and progress with strengthening/mobility exercises; decrease to 1x/wk.    Jonathan Favre, PTA

## 2022-10-26 ENCOUNTER — CLINICAL SUPPORT (OUTPATIENT)
Dept: REHABILITATION | Facility: HOSPITAL | Age: 72
End: 2022-10-26
Payer: MEDICARE

## 2022-10-26 DIAGNOSIS — M25.561 CHRONIC PAIN OF BOTH KNEES: Primary | ICD-10-CM

## 2022-10-26 DIAGNOSIS — G89.29 CHRONIC PAIN OF BOTH KNEES: Primary | ICD-10-CM

## 2022-10-26 DIAGNOSIS — M25.562 CHRONIC PAIN OF BOTH KNEES: Primary | ICD-10-CM

## 2022-10-26 PROCEDURE — 97110 THERAPEUTIC EXERCISES: CPT | Mod: PN,CQ

## 2022-10-26 NOTE — PROGRESS NOTES
"[facility]   Physical Therapy Treatment Note     Name: Marleni Sousa  Clinic Number: [id number]    Therapy Diagnosis:   Encounter Diagnosis   Name Primary?    Chronic pain of both knees Yes         Physician: Dread Mckinney MD    Visit Date: 10/26/2022    Physician Orders: PT Eval and Treat   Medical Diagnosis: primary OA bilateral knees   Evaluation Date: 8/10/2022  Authorization period Expiration: 12/31/2022  Plan of Care Certification Period: 11/5/2022     Visit #: 16 / Visits authorized: 24    PTA Visit #:  4/5    Time In: 9:30 AM  Time Out: 10:20 AM  Total Billable Time: 45 minutes    SUBJECTIVE     Pt reports: No new c/o's.  She was not compliant with home exercise program as one has not been issued yet.   Response to previous treatment: good  Functional change: improving balance    Pain: 5/10   Location: bilateral knee      OBJECTIVE     Objective Measures updated at progress report unless specified.     Treatment     HOMA received the treatments listed below:      therapeutic exercises to develop strength, endurance, ROM and flexibility for 45 minutes including:  Recumbent Bike level 5 x 15 min   Heel Raises x 20  Toe Taps on 6" step x 2 min  Step-ups on 6" step x 10 ea  Standing Hip Flex/Abd/Ext x 10 ea from 6" step  Side steps x-lt band x 2 min in // bars  Sit to Stand with green thera-band at knees x 10  Bridges with green thera-band at knees x 15  Ball Squeezes x 2 min  SLR with 2# 10 x 2 ea  SAQ on small gray roll with 2# x 2 min       Patient Education and Home Exercises     Home Exercises Provided and Patient Education Provided     Education provided:   -supporting lumbar spine with sitting   -quad sets for knees     Written Home Exercises Provided:  Exercises were reviewed and HOMA was able to demonstrate them prior to the end of the session.  HOMA demonstrated good  understanding of the education provided. See EMR under Patient Instructions for exercises provided " during therapy sessions    ASSESSMENT     Patient tolerated treatment without complication or increase pain reported. HOMA Is progressing well towards her goals. Will reassess progress towards goals at next visit.    Pt prognosis is Good.     Pt will continue to benefit from skilled outpatient physical therapy to address the deficits listed in the problem list box on initial evaluation, provide pt/family education and to maximize pt's level of independence in the home and community environment.     Pt's spiritual, cultural and educational needs considered and pt agreeable to plan of care and goals.     Anticipated barriers to physical therapy: None    Goals:   Short Term Goals: 3 weeks Goals met  Pain: reduce max pain levels to no more than 5/10 in order to improve performance of daily activities   Demonstrate compliance with initial exercise program     Long Term Goals: 6 weeks     Pain: Decrease pain to no more than 3/10 to allow for increased standing, walking, and performance of daily activities.   Strength: Improve strength in bilateral LE's  to 5/5 for improved LE stability  Functional scale: Improve score on FOTO - balance  to 40% limitation   Lifting: Lift a bag of groceries from trunk and carry up steps into house without pain, compensation or LOB.   Walking: Increase walking distance to 1/4 mile without pain  Postures: Increase standing duration to > 20 mins without pain   Transfers: Perform sit to stand from chair transfers without increased pain or limitation  Exercise: demonstrate independence with home exercise program to maintain gains made in therapy.         PLAN     Continue per POC and progress with strengthening/mobility exercises; decrease to 1x/wk.    Jonathan Favre, PTA

## 2022-11-02 ENCOUNTER — CLINICAL SUPPORT (OUTPATIENT)
Dept: REHABILITATION | Facility: HOSPITAL | Age: 72
End: 2022-11-02
Payer: MEDICARE

## 2022-11-02 DIAGNOSIS — M25.561 CHRONIC PAIN OF BOTH KNEES: Primary | ICD-10-CM

## 2022-11-02 DIAGNOSIS — M25.562 CHRONIC PAIN OF BOTH KNEES: Primary | ICD-10-CM

## 2022-11-02 DIAGNOSIS — G89.29 CHRONIC PAIN OF BOTH KNEES: Primary | ICD-10-CM

## 2022-11-02 PROCEDURE — 97110 THERAPEUTIC EXERCISES: CPT | Mod: PN

## 2022-11-02 NOTE — PROGRESS NOTES
"[facility]   Physical Therapy Treatment Note     Name: Marleni Sousa  Clinic Number: [id number]    Therapy Diagnosis:   Encounter Diagnosis   Name Primary?    Chronic pain of both knees Yes         Physician: Dread Mckinney MD    Visit Date: 11/2/2022    Physician Orders: PT Eval and Treat   Medical Diagnosis: primary OA bilateral knees   Evaluation Date: 8/10/2022  Authorization period Expiration: 12/31/2022  Plan of Care Certification Period: 11/5/2022     Visit #: 16 / Visits authorized: 24    PTA Visit #:  4/5    Time In: 9:30 AM  Time Out: 10:10  AM  Total Billable Time: 35 minutes    SUBJECTIVE     Pt reports  Increased knee pain today, difficulty getting up from chair after sitting for prolonged periods of time; reports falling last week - tripped over dog;   She was not compliant with home exercise program as one has not been issued yet.   Response to previous treatment: good  Functional change: knee pain -     Pain: 5/10   Location: bilateral knee      OBJECTIVE     Objective Measures updated at progress report unless specified.     Treatment     HOMA received the treatments listed below:      therapeutic exercises to develop strength, endurance, ROM and flexibility for 45 minutes including:  Recumbent Bike level 5 x 10 min   Heel Raises x 20  Toe Taps on 6" step x 2 min  Step-ups on 6" step x 10 ea  Standing Hip Flex/Abd/Ext x 10 ea - twisted red band   Side steps x-lt band x 2 min in // bars  Sit to Stand with green thera-band at knees x 10  Bridges with green thera-band at knees x 15  Clams - green band x 15 each   Ball Squeezes x 2 min  SLR with 2# 10 x 2 ea  SAQ on small gray roll with 2# x 2 min       Patient Education and Home Exercises     Home Exercises Provided and Patient Education Provided     Education provided:   -supporting lumbar spine with sitting   -quad sets for knees     Written Home Exercises Provided:  Exercises were reviewed and HOMA was able to " demonstrate them prior to the end of the session.  HOMA demonstrated good  understanding of the education provided. See EMR under Patient Instructions for exercises provided during therapy sessions    ASSESSMENT     Patient tolerated increased resistance and exercises today; reporting less knee pain after exercises.     HOMA Is progressing well towards her goals. Will reassess progress towards goals at next visit.    Pt prognosis is Good.     Pt will continue to benefit from skilled outpatient physical therapy to address the deficits listed in the problem list box on initial evaluation, provide pt/family education and to maximize pt's level of independence in the home and community environment.     Pt's spiritual, cultural and educational needs considered and pt agreeable to plan of care and goals.     Anticipated barriers to physical therapy: None    Goals:   Short Term Goals: 3 weeks Goals met  Pain: reduce max pain levels to no more than 5/10 in order to improve performance of daily activities   Demonstrate compliance with initial exercise program     Long Term Goals: 6 weeks     Pain: Decrease pain to no more than 3/10 to allow for increased standing, walking, and performance of daily activities.   Strength: Improve strength in bilateral LE's  to 5/5 for improved LE stability  Functional scale: Improve score on FOTO - balance  to 40% limitation   Lifting: Lift a bag of groceries from trunk and carry up steps into house without pain, compensation or LOB.   Walking: Increase walking distance to 1/4 mile without pain  Postures: Increase standing duration to > 20 mins without pain   Transfers: Perform sit to stand from chair transfers without increased pain or limitation  Exercise: demonstrate independence with home exercise program to maintain gains made in therapy.         PLAN     Continue per POC and progress with strengthening/mobility exercises; decrease to 1x/wk.    Sofía Martines, PT

## 2022-11-16 ENCOUNTER — CLINICAL SUPPORT (OUTPATIENT)
Dept: REHABILITATION | Facility: HOSPITAL | Age: 72
End: 2022-11-16
Payer: MEDICARE

## 2022-11-16 DIAGNOSIS — M25.562 CHRONIC PAIN OF BOTH KNEES: Primary | ICD-10-CM

## 2022-11-16 DIAGNOSIS — M25.561 CHRONIC PAIN OF BOTH KNEES: Primary | ICD-10-CM

## 2022-11-16 DIAGNOSIS — G89.29 CHRONIC PAIN OF BOTH KNEES: Primary | ICD-10-CM

## 2022-11-16 PROCEDURE — 97110 THERAPEUTIC EXERCISES: CPT | Mod: PN

## 2022-11-16 NOTE — PROGRESS NOTES
"[facility]   Physical Therapy Treatment Note     Name: Marleni Carpio Lars  Clinic Number: [id number]    Therapy Diagnosis:   Encounter Diagnosis   Name Primary?    Chronic pain of both knees Yes         Physician: Draed Mckinney MD    Visit Date: 11/16/2022    Physician Orders: PT Eval and Treat   Medical Diagnosis: primary OA bilateral knees   Evaluation Date: 8/10/2022  Authorization period Expiration: 12/31/2022  Plan of Care Certification Period: 11/5/2022     Visit #: 17 / Visits authorized: 24    PTA Visit #:  4/5    Time In: 9:20  AM  Time Out: 10:00 AM  Total Billable Time: 40 minutes    SUBJECTIVE     Pt reports  no new c/o's today; last visit today; Homa feels like her knees are  doing well; able to perform all of her usual activities without increased pain. Needs to be more aware of her surroundings to keep from falling.   She was not compliant with home exercise program as one has not been issued yet.   Response to previous treatment: good  Functional change: knee pain - less at 4/10; performing all of her usual activities now.     Pain: 4 /10   Location: bilateral knee      OBJECTIVE     Objective Measures updated at progress report unless specified.     Treatment     HOMA received the treatments listed below:      therapeutic exercises to develop strength, endurance, ROM and flexibility for 45 minutes including:  Recumbent Bike level 5 x 10 min   Heel Raises x 20  Toe Taps on 6" step x 2 min  Step-ups on 6" step x 10 ea  Standing Hip Flex/Abd/Ext x 10 ea - twisted red band   Side steps x-lt band x 2 min in // bars  Sit to Stand with green thera-band at knees x 10  Bridges with green thera-band at knees x 15  Clams - green band x 15 each   Ball Squeezes x 2 min  SLR with 2# 10 x 2 ea  SAQ on small gray roll with 4# x 2 min       Patient Education and Home Exercises     Home Exercises Provided and Patient Education Provided     Education provided:   -supporting lumbar " spine with sitting   -quad sets for knees     Written Home Exercises Provided:  Exercises were reviewed and HOMA was able to demonstrate them prior to the end of the session.  HOMA demonstrated good  understanding of the education provided. See EMR under Patient Instructions for exercises provided during therapy sessions    ASSESSMENT     Patient tolerated increased resistance and exercises today; reporting less knee pain after exercises. Last visit today; demonstrating full AROM in bilateral knees, strength 5/5;      HOMA Is progressing well towards her goals. Will reassess progress towards goals at next visit.    Pt prognosis is Good.     Pt will continue to benefit from skilled outpatient physical therapy to address the deficits listed in the problem list box on initial evaluation, provide pt/family education and to maximize pt's level of independence in the home and community environment.     Pt's spiritual, cultural and educational needs considered and pt agreeable to plan of care and goals.     Anticipated barriers to physical therapy: None    Goals:   Short Term Goals: 3 weeks Goals met  Pain: reduce max pain levels to no more than 5/10 in order to improve performance of daily activities   Demonstrate compliance with initial exercise program     Long Term Goals: 6 weeks     Pain: Decrease pain to no more than 3/10 to allow for increased standing, walking, and performance of daily activities. > Improving, pain varies between 3-4/10   Strength: Improve strength in bilateral LE's  to 5/5 for improved LE stability > MET   Functional scale: Improve score on FOTO - balance  to 40% limitation > MET   Lifting: Lift a bag of groceries from trunk and carry up steps into house without pain, compensation or LOB. > MET   Walking: Increase walking distance to 1/4 mile without pain > MET   Postures: Increase standing duration to > 20 mins without pain > MET   Transfers: Perform sit to stand from chair transfers without  increased pain or limitation > MET         PLAN     Discharge from physical therapy at this time.  Patient indicated that she was ready for discharge.     Sofía Martines, PT

## 2023-01-03 NOTE — PROGRESS NOTES
Physical Therapy Daily Note     Name: Marleni Carpio [facility] Number: [id number]  Diagnosis:   Encounter Diagnoses   Name Primary?    Postoperative pain of right knee     Weakness of right lower extremity Yes    Gait, antalgic      Physician: Dread Mckinney MD  Precautions: standard  Visit #: 5 of 12  PTA Visit #: 0  Time In:  2:30 pm     Time Out: 3:20 pm     Subjective     Pt reports:  My knee is doing fine;  A little painful at times, but I feel like I'm doing ok     Pain Scale: Marleni rates pain on a scale of 0-10 to be 4 currently.    Objective     Marleni received individual therapeutic exercises to develop strength, ROM and core stabilization for 30 minutes including:  Nu-Step x 15 mins > progressed to Recumbent Bike today - level 6 x 10 mins   Quad Sets: 3 mins   SLR 10 x 2  S/L hip abduction 10 x 2   Clams x 15  Bridges x 20   Ball Squeezes x 3 mins   Step-ups x 3 mins - 1 riser on step  Heel raises x 30   Wall Squats with SB x 10       Marleni received the following manual therapy techniques: Soft tissue Mobilization were applied to the: right knee  for 8 minutes including:  STM to distal quad, patellar mobilization in all planes; massage over incisions to prevent excessive scar tissue formation and to decrease sensitivity.     The patient received the following direct contact modalities after being cleared for contraindications:     The patient received the following supervised modalities after being cleared for contradictions:     Written Home Exercises Provided:   Quad Sets, SLR, Sit <>Stand; walking   Pt demo good understanding of the education provided. Marleni demonstrated good return demonstration of activities.     Education provided re:  Marleni verbalized good understanding of education provided.   No spiritual or educational barriers to learning provided    Assessment     Patient tolerated treatment well; Balaji demonstrates improved  ability to activate her quad mm; ROM is good; Should return to PLOF without difficulty   This is a 70 y.o. female referred to outpatient physical therapy and presents with a medical diagnosis of medial/lateral menisectomy Right knee and demonstrates limitations as described in the problem list. Pt prognosis is Good. Pt will continue to benefit from skilled outpatient physical therapy to address the deficits listed in the problem list, provide pt/family education and to maximize pt's level of independence in the home and community environment.     Goals as follows:  Long Term Goals: 6 weeks  Pain: Decrease pain to no more than 1/10 to allow for improved ability to perform daily activities.   Strength: Improve strength in right Hip to knee by 1/2 grade for improved LE stability  ROM: Improve ROM to 100% of normal limits   Functional scale: Improve score on LEFS to no more than 30% limitation.   Lifting: Lift 5 lbs to waist level, and carry for 15 feet without pain or compensation  Walking: Increase walking distance/duration to 6 blocks without pain  Postures: Increase sitting and/or standing duration to 30 mins without pain   Transfers: Perform sit <> stand transfers without increased pain or limitation - 10 reps in 30 secs.   Exercise: demonstrate independence with home exercise program to maintain gains made in therapy.          Plan     Continue with established Plan of Care towards PT goals.    Therapist: Sofía Martines, PT  12/30/2020     no

## 2024-07-23 DIAGNOSIS — M54.16 RADICULOPATHY, LUMBAR REGION: Primary | ICD-10-CM

## 2024-08-09 ENCOUNTER — CLINICAL SUPPORT (OUTPATIENT)
Dept: REHABILITATION | Facility: HOSPITAL | Age: 74
End: 2024-08-09
Payer: MEDICARE

## 2024-08-09 DIAGNOSIS — M54.16 RADICULOPATHY, LUMBAR REGION: ICD-10-CM

## 2024-08-09 DIAGNOSIS — Z74.09 IMPAIRED FUNCTIONAL MOBILITY, BALANCE, GAIT, AND ENDURANCE: Primary | ICD-10-CM

## 2024-08-09 PROCEDURE — 97161 PT EVAL LOW COMPLEX 20 MIN: CPT | Mod: PN

## 2024-08-09 NOTE — PLAN OF CARE
"[facility]   Physical Therapy Initial Evaluation      Name: Marleni Sousa  Clinic Number: [id number]    Therapy Diagnosis:   Encounter Diagnoses   Name Primary?    Radiculopathy, lumbar region     Impaired functional mobility, balance, gait, and endurance Yes        Physician: Harper Segovia PA-C    Physician Orders: PT Eval and Treat   Medical Diagnosis from Referral:   M54.16 (ICD-10-CM) - Radiculopathy, lumbar region  Evaluation Date: 8/9/2024  Authorization Period Expiration: 12/31/2024  Plan of Care Expiration: 11/9/2024  Progress Note Due: 9/30/2024  Date of Surgery: n/a  Visit # / Visits authorized: 1/ 1   FOTO: 1/ 3    Precautions: Standard     Time In: 9:30   Time Out: 10:15   Total Billable Time: 45  minutes    Subjective     Date of onset: chronic, but pain exacerbation following her last lumbar RFA about a month ago.     History of current condition - Balaji reports: she has been experiencing increased paresthesias in her LE's and feet over the past several months.  She underwent another lumbar RFA about a month ago and stated that after the procedure her legs just felt like jelly.  She went to get up, and her legs buckled under her, fall backwards onto her buttocks.  Balaji sated that she has had increased back pain since this.  Reports that her legs felt like jello for several hours after the procedure.     Falls: fell getting up after last RFA - legs like "jelly" fell backwards onto buttocks;     Imaging: MRI studies:  do not have copy of last MRI     Prior Therapy: Balaji has not had any recent PT for her lumbar spine;  She has been to this clinic in the past for her shoulder, knee   Social History: lives with spouse ; steps (5 steps, landing, 3 steps)  into house with railing   Occupation: retired   Prior Level of Function: Independent; was able to walk at community required distances without increased pain; always has some level of lumbar pain.   Current Level of " Function:   limited walking to about 1 block; standing is OK; sitting < 30 mins   Independent with ADL's; history of neuropathy BLE's - lower legs/feet.   Right knee scope, cold ablation for pain - still uncomfortable, but can manage     Pain:  Current 5/10, worst 8/10, best 5/10   Location: bilateral, but Left greater than Right   Description: Aching, Throbbing, Grabbing, and Tingling noted bilateral lower legs> feet (chronic as result of neuropathy)   Aggravating Factors: Sitting, Standing, Walking, and Night Time  Easing Factors:  has been on prednisone - 2 weeks - this has helped     Patients goals: To reduce my pain and improve activity tolerance, functional mobility      Medical History:   No past medical history on file.    Surgical History:   Marleni Sousa  has no past surgical history on file.    Medications:   Marleni currently has no medications in their medication list.    Allergies:   Review of patient's allergies indicates:  Not on File     Objective      Observation: Balaji is alert/oriented x 4; She is not in any acute distress at this time. Pleasant, cooperative; Reporting moderate pain today - good day.     Posture:    -       Rounded shoulders  -       Forward head    Gait: non-antalgic gait pattern noted; good pace     Lumbar Range of Motion:    % of Normal Range Pain   Flexion 25 - hands to ankles  Hinging at T-L junction    Extension 10    Left Side Bending 10 Painful, limited    Right Side Bending 25     Left rotation 40    Right Rotation 35 Tightness left side       Lower Extremity Strength   Left Right   Knee extension: 4-/5 4-/5   Knee flexion: 3+/5 3+/5   Hip flexion: 4/5 4/5   Hip extension:  3+/5 3+/5   Hip abduction: 4-/5 4-/5   Hip adduction: 3+/5 3+/5   Ankle dorsiflexion: 4-/5  4/5   Ankle plantarflexion: 3+/5 3+/5   Upper abdominals 3+/5    Lower abdominals 3/5    Back extensors 3/5      Special Tests:    Repeated Flexion Negative   Repeated Extension Positive   Prone  Instability Negative   Straight Leg Raise Negative   Slump Negative   Quadrant Negative   Femoral nerve test Negative       DTR:   Left Right Comment   Patellar (L3-4) 2+ 2+    Achilles (S1) 2+ 2+        Joint Mobility:   Lumbar: restricted segmental motion, able to easily forward bend - accomplishes this by moving from T-L junction; reduction in segment movement in lower lumbar spine. Weakness in gluteals adding to lumbar compression. Neuropathy in LE's/feet as well.     Thoracic: does have some restriction in rotation; Previous right shoulder TSA with reduction in strength     Palpation: moderate tenderness to palpation at lumbar fascia, along posterior pelvic rim; bilateral piriformis tenderness,     Sensation: intact to light touch, but does have neuropathy in feet with reduction in pressure sensation on plantar surface.     Flexibility:     90/90 SLR = R minimal restriction, L minimal restriction   Ely's test: R no restriction, L no restriction   Myesha's test: R no restriction, L no restriction   Chito test: R no restriction, L no restriction    PT Evaluation Completed: Yes  Discussed Plan of Care with patient: Yes    Intake Outcome Measure for FOTO Lumbar  Survey    Therapist reviewed FOTO scores for Marleni Sousa on 8/9/2024.   FOTO report - see Media section or FOTO account episode details.    Intake Score: 46 %         Treatment   No treatment provided today     Patient Education and Home Exercises     Education provided:   - Role of PT; Importance of attendance; need to strengthen LE's to help decompress her lumbar spine.     Written Home Exercises Provided: will develop HEP over next few visits.  Balaji demonstrated good  understanding of the education provided.     Assessment     Marleni is a 74 y.o. female referred to outpatient Physical Therapy with a medical diagnosis of Lumbar radiculopathy. Patient presents with chronic lumbar pain with recent exacerbation following last RFA.  Balaji demonstrates  restriction in lumbar segmental motion, reduction in gluteal and LE strength leading to compression of lumbar spine, lumbar fascia tension and reduced activity tolerance and functional mobility as result of her pain. Balaji will benefit from manual tx to address lumbar segmental restrictions an lumbar fascia restriction and good core/LE strengthening program - land based and aquatic based in order to improve her activity tolerance and quality of life.     Patient prognosis is Good.   Patient will benefit from skilled outpatient Physical Therapy to address the deficits stated above and in the chart below, provide patient /family education, and to maximize patientt's level of independence.     Plan of care discussed with patient: Yes  Patient's spiritual, cultural and educational needs considered and patient is agreeable to the plan of care and goals as stated below:     Anticipated Barriers for therapy: none     Medical Necessity is demonstrated by the following  History  Co-morbidities and personal factors that may impact the plan of care [x] LOW: no personal factors / co-morbidities  [] MODERATE: 1-2 personal factors / co-morbidities  [] HIGH: 3+ personal factors / co-morbidities    Moderate / High Support Documentation:   Co-morbidities affecting plan of care: n/a    Personal Factors:   no deficits     Examination  Body Structures and Functions, activity limitations and participation restrictions that may impact the plan of care [x] LOW: addressing 1-2 elements  [] MODERATE: 3+ elements  [] HIGH: 4+ elements (please support below)    Moderate / High Support Documentation: n/a     Clinical Presentation [x] LOW: stable  [] MODERATE: Evolving  [] HIGH: Unstable     Decision Making/ Complexity Score: low       Goals:  Short Term Goals: 3 weeks   Demonstrate improvement in recent symptoms to progress toward long term goals   Correct postural deficits in standing/sitting to reduce pain and promote postural awareness for  injury prevention.   Demonstrate compliance with initial exercise program     Long Term Goals: 6 weeks   Able to perform all household chores with no exacerbation of symptoms   Able to ambulate community required distances with no limitation   Able to ascend/descend 10 steps without limitation   Able to perform all transfers with no limitation   Improvement in LE strength by 1/2 grade   Improvement in FOTO score to > 60   Independent with HEP for continued improvement in function.     Plan     Plan of care Certification: 8/9/2024 to 11/9/2024.    Outpatient Physical Therapy 2 times weekly for 6 weeks to include the following interventions: Aquatic Therapy, Manual Therapy, Neuromuscular Re-ed, Patient Education, Therapeutic Activities, and Therapeutic Exercise.     Sofía Martines, PT        Physician's Signature: _________________________________________ Date: ________________

## 2024-08-13 ENCOUNTER — CLINICAL SUPPORT (OUTPATIENT)
Dept: REHABILITATION | Facility: HOSPITAL | Age: 74
End: 2024-08-13
Payer: MEDICARE

## 2024-08-13 DIAGNOSIS — Z74.09 IMPAIRED FUNCTIONAL MOBILITY, BALANCE, GAIT, AND ENDURANCE: Primary | ICD-10-CM

## 2024-08-13 PROCEDURE — 97112 NEUROMUSCULAR REEDUCATION: CPT | Mod: PN

## 2024-08-13 PROCEDURE — 97110 THERAPEUTIC EXERCISES: CPT | Mod: PN

## 2024-08-13 PROCEDURE — 97140 MANUAL THERAPY 1/> REGIONS: CPT | Mod: PN

## 2024-08-13 NOTE — PROGRESS NOTES
"[facility]   Physical Therapy Treatment Note      Name: Marleni Carpio [facility]  Clinic Number: [id number]    Therapy Diagnosis:   Encounter Diagnosis   Name Primary?    Impaired functional mobility, balance, gait, and endurance Yes     Physician: Harper Segovia PA-C    Visit Date: 8/13/2024    Physician Orders: PT Eval and Treat   Medical Diagnosis from Referral:   M54.16 (ICD-10-CM) - Radiculopathy, lumbar region  Evaluation Date: 8/9/2024  Authorization Period Expiration: 12/31/2024  Plan of Care Expiration: 11/9/2024  Progress Note Due: 9/30/2024  Date of Surgery: n/a  Visit # / Visits authorized: 1/ 12  FOTO: 1/ 3     Precautions: Standard      Time In: 1:15 pm  Time Out: 2:20 pm    Total Billable Time: 60   minutes    PTA Visit #: 0/5       Subjective     Patient reports: My back is starting to hurt again, took my last prednisone today.   She was compliant with home exercise program.  Response to previous treatment: less back pain after eval   Functional change: has been able to walk a little bit better     Pain: 5/10  Location: left lumbar      Objective      Objective Measures updated at progress report unless specified.     Treatment     Balaji received the treatments listed below:      therapeutic exercises to develop strength, ROM, and core stabilization for 15 minutes including:  Nu-Step - Level 5 x 15 mins     manual therapy techniques: Joint mobilizations and Soft tissue Mobilization were applied to the: lumbar spine  for 15 minutes, including:  S/L flexion to bilateral lumbar spine segments, blocking of segment above for max facet opening; Supine - sub-occipital inhibition with STM of occipitals with end range light traction     neuromuscular re-education activities to improve: Kinesthetic and Posture for 25  minutes. The following activities were included:  Early abd mm activation with red ball - pressure against knees 5" hold x 10   Posterior pelvic tilt x 10   Ball squeezes " x 2 mins   Segmental bridges - band around thighs x 15   DKC with use of SB x 2 mins   Open book stretches x 10 each side  Clams - band around thighs x 10 each side     therapeutic activities to improve functional performance for   minutes, including:    Patient Education and Home Exercises       Education provided:   - Core mm education for better spine support     Written Home Exercises Provided: Yes. Exercises were reviewed and Balaji was able to demonstrate them prior to the end of the session.  Balaji demonstrated good  understanding of the education provided. See Electronic Medical Record under Patient Instructions for exercises provided during therapy sessions    Assessment   Balaji is responding well to therapy - noted reduction in her back pain after 1st two visits; Needs improvement in core/hip girdle strength and mm activation for reduction in compression on spine and improved trunk support for daily activities.     Marleni is a 74 y.o. female referred to outpatient Physical Therapy with a medical diagnosis of Lumbar radiculopathy. Patient presents with chronic lumbar pain with recent exacerbation following last RFA.  Balaji demonstrates restriction in lumbar segmental motion, reduction in gluteal and LE strength leading to compression of lumbar spine, lumbar fascia tension and reduced activity tolerance and functional mobility as result of her pain. Balaji will benefit from manual tx to address lumbar segmental restrictions an lumbar fascia restriction and good core/LE strengthening program - land based and aquatic based in order to improve her activity tolerance and quality of life.     Balaji Is progressing well towards her goals.   Patient prognosis is Good.     Patient will continue to benefit from skilled outpatient physical therapy to address the deficits listed in the problem list box on initial evaluation, provide pt/family education and to maximize pt's level of independence in the home and community  environment.     Patient's spiritual, cultural and educational needs considered and pt agreeable to plan of care and goals.     Anticipated barriers to physical therapy: none     Goals:   Short Term Goals: 3 weeks   Demonstrate improvement in recent symptoms to progress toward long term goals   Correct postural deficits in standing/sitting to reduce pain and promote postural awareness for injury prevention.   Demonstrate compliance with initial exercise program      Long Term Goals: 6 weeks   Able to perform all household chores with no exacerbation of symptoms   Able to ambulate community required distances with no limitation   Able to ascend/descend 10 steps without limitation   Able to perform all transfers with no limitation   Improvement in LE strength by 1/2 grade   Improvement in FOTO score to > 60   Independent with HEP for continued improvement in function.     Plan     Plan of care Certification: 8/9/2024 to 11/9/2024.     Outpatient Physical Therapy 2 times weekly for 6 weeks to include the following interventions: Aquatic Therapy, Manual Therapy, Neuromuscular Re-ed, Patient Education, Therapeutic Activities, and Therapeutic Exercise    Sofía Martines, PT

## 2024-08-15 ENCOUNTER — CLINICAL SUPPORT (OUTPATIENT)
Dept: REHABILITATION | Facility: HOSPITAL | Age: 74
End: 2024-08-15
Payer: MEDICARE

## 2024-08-15 DIAGNOSIS — Z74.09 IMPAIRED FUNCTIONAL MOBILITY, BALANCE, GAIT, AND ENDURANCE: Primary | ICD-10-CM

## 2024-08-15 PROCEDURE — 97110 THERAPEUTIC EXERCISES: CPT | Mod: PN

## 2024-08-15 PROCEDURE — 97112 NEUROMUSCULAR REEDUCATION: CPT | Mod: PN

## 2024-08-15 NOTE — PROGRESS NOTES
"[facility]   Physical Therapy Treatment Note      Name: Marleni Sousa  Clinic Number: [id number]    Therapy Diagnosis:   Encounter Diagnosis   Name Primary?    Impaired functional mobility, balance, gait, and endurance Yes     Physician: Harper Segovia PA-C    Visit Date: 8/15/2024    Physician Orders: PT Eval and Treat   Medical Diagnosis from Referral:   M54.16 (ICD-10-CM) - Radiculopathy, lumbar region  Evaluation Date: 8/9/2024  Authorization Period Expiration: 12/31/2024  Plan of Care Expiration: 11/9/2024  Progress Note Due: 9/30/2024  Date of Surgery: n/a  Visit # / Visits authorized: 2/ 12  FOTO: 1/ 3     Precautions: Standard      Time In: 8:50 am   Time Out: 9:35 am     Total Billable Time:     40  minutes    PTA Visit #: 0/5       Subjective     Patient reports:  I'm doing ok this morning, running a little late.   She was compliant with home exercise program.  Response to previous treatment:  my neck and back felt so much better   Functional change:  feels like she is able to move more without increased pain     Pain: 3/10  Location: left lumbar      Objective      Objective Measures updated at progress report unless specified.     Treatment     Balaji received the treatments listed below:      therapeutic exercises to develop strength, ROM, and core stabilization for 15 minutes including:  Nu-Step - Level 5 x 15 mins     manual therapy techniques: Joint mobilizations and Soft tissue Mobilization were applied to the: lumbar spine  for 0 minutes, including:  S/L flexion to bilateral lumbar spine segments, blocking of segment above for max facet opening; Supine - sub-occipital inhibition with STM of occipitals with end range light traction     neuromuscular re-education activities to improve: Kinesthetic and Posture for 25  minutes. The following activities were included:  Early abd mm activation with red ball - pressure against knees 5" hold x 10   Posterior pelvic tilt x " 10   Ball squeezes x 2 mins   Segmental bridges - band around thighs x 15   DKC with use of SB x 2 mins   Open book stretches x 10 each side  Clams - band around thighs x 10 each side     therapeutic activities to improve functional performance for   minutes, including:    Patient Education and Home Exercises       Education provided:   - Core mm education for better spine support     Written Home Exercises Provided: Yes. Exercises were reviewed and Balaji was able to demonstrate them prior to the end of the session.  Balaji demonstrated good  understanding of the education provided. See Electronic Medical Record under Patient Instructions for exercises provided during therapy sessions    Assessment   Balaji is responding well to therapy - noted reduction in her back pain after 1st two visits; Needs improvement in core/hip girdle strength and mm activation for reduction in compression on spine and improved trunk support for daily activities.     Marleni is a 74 y.o. female referred to outpatient Physical Therapy with a medical diagnosis of Lumbar radiculopathy. Patient presents with chronic lumbar pain with recent exacerbation following last RFA.  Balaji demonstrates restriction in lumbar segmental motion, reduction in gluteal and LE strength leading to compression of lumbar spine, lumbar fascia tension and reduced activity tolerance and functional mobility as result of her pain. Balaji will benefit from manual tx to address lumbar segmental restrictions an lumbar fascia restriction and good core/LE strengthening program - land based and aquatic based in order to improve her activity tolerance and quality of life.     Balaji Is progressing well towards her goals.   Patient prognosis is Good.     Patient will continue to benefit from skilled outpatient physical therapy to address the deficits listed in the problem list box on initial evaluation, provide pt/family education and to maximize pt's level of independence in the home  and community environment.     Patient's spiritual, cultural and educational needs considered and pt agreeable to plan of care and goals.     Anticipated barriers to physical therapy: none     Goals:   Short Term Goals: 3 weeks   Demonstrate improvement in recent symptoms to progress toward long term goals   Correct postural deficits in standing/sitting to reduce pain and promote postural awareness for injury prevention.   Demonstrate compliance with initial exercise program      Long Term Goals: 6 weeks   Able to perform all household chores with no exacerbation of symptoms   Able to ambulate community required distances with no limitation   Able to ascend/descend 10 steps without limitation   Able to perform all transfers with no limitation   Improvement in LE strength by 1/2 grade   Improvement in FOTO score to > 60   Independent with HEP for continued improvement in function.     Plan     Plan of care Certification: 8/9/2024 to 11/9/2024.     Outpatient Physical Therapy 2 times weekly for 6 weeks to include the following interventions: Aquatic Therapy, Manual Therapy, Neuromuscular Re-ed, Patient Education, Therapeutic Activities, and Therapeutic Exercise    Sofía Martines, PT

## 2024-08-22 ENCOUNTER — CLINICAL SUPPORT (OUTPATIENT)
Dept: REHABILITATION | Facility: HOSPITAL | Age: 74
End: 2024-08-22
Payer: MEDICARE

## 2024-08-22 DIAGNOSIS — Z74.09 IMPAIRED FUNCTIONAL MOBILITY, BALANCE, GAIT, AND ENDURANCE: Primary | ICD-10-CM

## 2024-08-22 PROCEDURE — 97110 THERAPEUTIC EXERCISES: CPT | Mod: PN

## 2024-08-22 PROCEDURE — 97140 MANUAL THERAPY 1/> REGIONS: CPT | Mod: PN

## 2024-08-22 PROCEDURE — 97112 NEUROMUSCULAR REEDUCATION: CPT | Mod: PN

## 2024-08-22 NOTE — PROGRESS NOTES
"[facility]   Physical Therapy Treatment Note      Name: Marleni Sousa  Clinic Number: [id number]    Therapy Diagnosis:   Encounter Diagnosis   Name Primary?    Impaired functional mobility, balance, gait, and endurance Yes     Physician: Harper Segovia PA-C    Visit Date: 8/22/2024    Physician Orders: PT Eval and Treat   Medical Diagnosis from Referral:   M54.16 (ICD-10-CM) - Radiculopathy, lumbar region  Evaluation Date: 8/9/2024  Authorization Period Expiration: 12/31/2024  Plan of Care Expiration: 11/9/2024  Progress Note Due: 9/30/2024  Date of Surgery: n/a  Visit # / Visits authorized: 3/ 12  FOTO: 1/ 3     Precautions: Standard      Time In: 8:50 am   Time Out: 9:35 am     Total Billable Time:     45  minutes    PTA Visit #: 0/5       Subjective     Patient reports:  I'm doing ok this morning, running a little late.   She was compliant with home exercise program.  Response to previous treatment:  my neck and back felt so much better   Functional change:  feels like she is able to move more without increased pain     Pain: 3/10  Location: left lumbar      Objective      Objective Measures updated at progress report unless specified.     Treatment     Balaji received the treatments listed below:      therapeutic exercises to develop strength, ROM, and core stabilization for 15 minutes including:  Nu-Step - Level 3 x 15 mins     manual therapy techniques: Joint mobilizations and Soft tissue Mobilization were applied to the: lumbar spine  for 10  minutes, including:  S/L flexion to bilateral lumbar spine segments, blocking of segment above for max facet opening; Supine - sub-occipital inhibition with STM of occipitals with end range light traction     neuromuscular re-education activities to improve: Kinesthetic and Posture for 20 minutes. The following activities were included:  Early abd mm activation with red ball - pressure against knees 5" hold x 10   Posterior pelvic tilt " x 10   Ball squeezes x 2 mins   Segmental bridges - band around thighs x 15   DKC with use of SB x 2 mins   Open book stretches x 10 each side  Clams - band around thighs x 10 each side     therapeutic activities to improve functional performance for   minutes, including:    Patient Education and Home Exercises       Education provided:   - Core mm education for better spine support     Written Home Exercises Provided: Yes. Exercises were reviewed and Balaji was able to demonstrate them prior to the end of the session.  Balaji demonstrated good  understanding of the education provided. See Electronic Medical Record under Patient Instructions for exercises provided during therapy sessions    Assessment   Balaji is responding well to therapy - had to miss appointment earlier this week due to stomach virus; lower back pain significantly improved since initiating therapy;  Needs improvement in core/hip girdle strength and mm activation for reduction in compression on spine and improved trunk support for daily activities.     Marleni is a 74 y.o. female referred to outpatient Physical Therapy with a medical diagnosis of Lumbar radiculopathy. Patient presents with chronic lumbar pain with recent exacerbation following last RFA.  Balaji demonstrates restriction in lumbar segmental motion, reduction in gluteal and LE strength leading to compression of lumbar spine, lumbar fascia tension and reduced activity tolerance and functional mobility as result of her pain. Balaji will benefit from manual tx to address lumbar segmental restrictions an lumbar fascia restriction and good core/LE strengthening program - land based and aquatic based in order to improve her activity tolerance and quality of life.     Balaji Is progressing well towards her goals.   Patient prognosis is Good.     Patient will continue to benefit from skilled outpatient physical therapy to address the deficits listed in the problem list box on initial evaluation, provide  pt/family education and to maximize pt's level of independence in the home and community environment.     Patient's spiritual, cultural and educational needs considered and pt agreeable to plan of care and goals.     Anticipated barriers to physical therapy: none     Goals:   Short Term Goals: 3 weeks   Demonstrate improvement in recent symptoms to progress toward long term goals   Correct postural deficits in standing/sitting to reduce pain and promote postural awareness for injury prevention.   Demonstrate compliance with initial exercise program      Long Term Goals: 6 weeks   Able to perform all household chores with no exacerbation of symptoms   Able to ambulate community required distances with no limitation   Able to ascend/descend 10 steps without limitation   Able to perform all transfers with no limitation   Improvement in LE strength by 1/2 grade   Improvement in FOTO score to > 60   Independent with HEP for continued improvement in function.     Plan     Plan of care Certification: 8/9/2024 to 11/9/2024.     Outpatient Physical Therapy 2 times weekly for 6 weeks to include the following interventions: Aquatic Therapy, Manual Therapy, Neuromuscular Re-ed, Patient Education, Therapeutic Activities, and Therapeutic Exercise    Sofía Martines, PT

## 2024-08-27 ENCOUNTER — CLINICAL SUPPORT (OUTPATIENT)
Dept: REHABILITATION | Facility: HOSPITAL | Age: 74
End: 2024-08-27
Payer: MEDICARE

## 2024-08-27 DIAGNOSIS — Z74.09 IMPAIRED FUNCTIONAL MOBILITY, BALANCE, GAIT, AND ENDURANCE: Primary | ICD-10-CM

## 2024-08-27 PROCEDURE — 97112 NEUROMUSCULAR REEDUCATION: CPT | Mod: PN,CQ

## 2024-08-27 PROCEDURE — 97110 THERAPEUTIC EXERCISES: CPT | Mod: PN,CQ

## 2024-08-27 NOTE — PROGRESS NOTES
"[facility]   Physical Therapy Treatment Note      Name: Marleni Carpio Lars  Clinic Number: [id number]    Therapy Diagnosis:   Encounter Diagnosis   Name Primary?    Impaired functional mobility, balance, gait, and endurance Yes     Physician: Hraper Segovia PA-C    Visit Date: 8/27/2024    Physician Orders: PT Eval and Treat   Medical Diagnosis from Referral:   M54.16 (ICD-10-CM) - Radiculopathy, lumbar region  Evaluation Date: 8/9/2024  Authorization Period Expiration: 12/31/2024  Plan of Care Expiration: 11/9/2024  Progress Note Due: 9/30/2024  Date of Surgery: n/a  Visit # / Visits authorized: 4 / 12  FOTO: 1/ 3     Precautions: Standard      Time In: 9:35 AM  Time Out: 10:30 AM    Total Billable Time:  45 minutes    PTA Visit #: 1/5       Subjective     Patient reports:  I feel like the PT is really helping.    She was compliant with home exercise program.  Response to previous treatment:  my neck and back felt so much better   Functional change:  feels like she is able to move more without increased pain     Pain: 3-4/10  Location: left lumbar      Objective      Objective Measures updated at progress report unless specified.     Treatment     Balaji received the treatments listed below:      therapeutic exercises to develop strength, ROM, and core stabilization for 15 minutes including:  Nu-Step - Level 3 x 15 mins   Heel Cord stretch on wedge 3 x 30 sec    manual therapy techniques: Joint mobilizations and Soft tissue Mobilization were applied to the: lumbar spine  for 0  minutes, including:  S/L flexion to bilateral lumbar spine segments, blocking of segment above for max facet opening; Supine - sub-occipital inhibition with STM of occipitals with end range light traction     neuromuscular re-education activities to improve: Kinesthetic and Posture for 25 minutes. The following activities were included:  Early abd mm activation with red ball - pressure against knees 5" hold x " 15  Posterior pelvic tilt x 15  Ball squeezes x 2 mins   Segmental bridges - red band around thighs x 15   DKC w/ PB x 2 min   LTR w/ PB x 2 min  Open book stretches x 10 each side  Clams - band around thighs x 15 each side     therapeutic activities to improve functional performance for   minutes, including:    Patient Education and Home Exercises       Education provided:   - Core mm education for better spine support     Written Home Exercises Provided: Yes. Exercises were reviewed and Balaji was able to demonstrate them prior to the end of the session.  Balaji demonstrated good  understanding of the education provided. See Electronic Medical Record under Patient Instructions for exercises provided during therapy sessions    Assessment   Balaji is responding well to therapy - lower back pain significantly improved since initiating therapy;  Needs improvement in core/hip girdle strength and mm activation for reduction in compression on spine and improved trunk support for daily activities.     Marleni is a 74 y.o. female referred to outpatient Physical Therapy with a medical diagnosis of Lumbar radiculopathy. Patient presents with chronic lumbar pain with recent exacerbation following last RFA.  Balaji demonstrates restriction in lumbar segmental motion, reduction in gluteal and LE strength leading to compression of lumbar spine, lumbar fascia tension and reduced activity tolerance and functional mobility as result of her pain. Balaji will benefit from manual tx to address lumbar segmental restrictions an lumbar fascia restriction and good core/LE strengthening program - land based and aquatic based in order to improve her activity tolerance and quality of life.     Balaji Is progressing well towards her goals.   Patient prognosis is Good.     Patient will continue to benefit from skilled outpatient physical therapy to address the deficits listed in the problem list box on initial evaluation, provide pt/family education and  to maximize pt's level of independence in the home and community environment.     Patient's spiritual, cultural and educational needs considered and pt agreeable to plan of care and goals.     Anticipated barriers to physical therapy: none     Goals:   Short Term Goals: 3 weeks   Demonstrate improvement in recent symptoms to progress toward long term goals   Correct postural deficits in standing/sitting to reduce pain and promote postural awareness for injury prevention.   Demonstrate compliance with initial exercise program      Long Term Goals: 6 weeks   Able to perform all household chores with no exacerbation of symptoms   Able to ambulate community required distances with no limitation   Able to ascend/descend 10 steps without limitation   Able to perform all transfers with no limitation   Improvement in LE strength by 1/2 grade   Improvement in FOTO score to > 60   Independent with HEP for continued improvement in function.     Plan     Plan of care Certification: 8/9/2024 to 11/9/2024.     Outpatient Physical Therapy 2 times weekly for 6 weeks to include the following interventions: Aquatic Therapy, Manual Therapy, Neuromuscular Re-ed, Patient Education, Therapeutic Activities, and Therapeutic Exercise    Jonathan Favre, PTA

## 2024-08-29 ENCOUNTER — CLINICAL SUPPORT (OUTPATIENT)
Dept: REHABILITATION | Facility: HOSPITAL | Age: 74
End: 2024-08-29
Payer: MEDICARE

## 2024-08-29 DIAGNOSIS — Z74.09 IMPAIRED FUNCTIONAL MOBILITY, BALANCE, GAIT, AND ENDURANCE: Primary | ICD-10-CM

## 2024-08-29 PROCEDURE — 97530 THERAPEUTIC ACTIVITIES: CPT | Mod: PN

## 2024-08-29 PROCEDURE — 97140 MANUAL THERAPY 1/> REGIONS: CPT | Mod: PN

## 2024-08-29 NOTE — PROGRESS NOTES
[facility]   Physical Therapy Treatment Note      Name: Marleni Carpio Lars  Clinic Number: [id number]    Therapy Diagnosis:   Encounter Diagnosis   Name Primary?    Impaired functional mobility, balance, gait, and endurance Yes     Physician: Harper Segovia PA-C    Visit Date: 8/29/2024    Physician Orders: PT Eval and Treat   Medical Diagnosis from Referral:   M54.16 (ICD-10-CM) - Radiculopathy, lumbar region  Evaluation Date: 8/9/2024  Authorization Period Expiration: 12/31/2024  Plan of Care Expiration: 11/9/2024  Progress Note Due: 9/30/2024  Date of Surgery: n/a  Visit # / Visits authorized: 5 / 12  FOTO: 1/ 3     Precautions: Standard      Time In: 9:35 AM  Time Out: 10:30 AM    Total Billable Time:  55  minutes    PTA Visit #: 0/5       Subjective     Patient reports:  C/o acute pain in left shoulder/UE - has a lot of pain lifting her arm as well as putting hand behind her back; reports no falls, has been lifting heavy boxes repeatedly at home; Saw Dr. Marti yesterday for her back, he ordered an MRI for her shoulder for her.  Balaji is also going to see a hand specialist for her left hand -failed trigger finger surgery back in June.    She was compliant with home exercise program.  Response to previous treatment:  my neck and back felt so much better   Functional change:  feels like she is able to move more without increased pain     Pain: 3-4/10  Location: left lumbar      Objective      Objective Measures updated at progress report unless specified.     Treatment     Balaji received the treatments listed below:      therapeutic exercises to develop strength, ROM, and core stabilization for 15 minutes including:  Nu-Step - Level 3 x 10 mins -unable to use LUE.   Heel Cord stretch on wedge 3 x 30 sec    manual therapy techniques: Joint mobilizations and Soft tissue Mobilization were applied to the: lumbar spine  for 25  minutes, including:  S/L flexion to bilateral lumbar spine  "segments, blocking of segment above for max facet opening; paraspinal skin rolling; STM lumbar fascia; Supine - sub-occipital inhibition with STM of occipitals with end range light traction   K-tape to left shoulder for pain relief and facilitation of deltoid for increased use of arm.  Patient to get MRI tomorrow.     neuromuscular re-education activities to improve: Kinesthetic and Posture for 28 minutes. The following activities were included:  Early abd mm activation with red ball - pressure against knees 5" hold x 15  Posterior pelvic tilt x 15  Ball squeezes x 2 mins   Segmental bridges - red band around thighs x 15   DKC w/ PB x 2 min   LTR w/ PB x 2 min  Open book stretches x 10 each side  Clams - band around thighs x 15 each side     therapeutic activities to improve functional performance for   minutes, including:    Patient Education and Home Exercises       Education provided:   - Core mm education for better spine support     Written Home Exercises Provided: Yes. Exercises were reviewed and Balaji was able to demonstrate them prior to the end of the session.  Balaji demonstrated good  understanding of the education provided. See Electronic Medical Record under Patient Instructions for exercises provided during therapy sessions    Assessment   Balaji is responding well to therapy - lower back pain significantly improved since initiating therapy;  Needs improvement in core/hip girdle strength and mm activation for reduction in compression on spine and improved trunk support for daily activities.  New onset of left shoulder pain and difficulty moving it - no fall or injury; stated she has been unpacking, moving/lifting boxes quite a bit over the past few days.  To have MRI on shoulder tomorrow.     Marleni is a 74 y.o. female referred to outpatient Physical Therapy with a medical diagnosis of Lumbar radiculopathy. Patient presents with chronic lumbar pain with recent exacerbation following last RFA.  Balaji" demonstrates restriction in lumbar segmental motion, reduction in gluteal and LE strength leading to compression of lumbar spine, lumbar fascia tension and reduced activity tolerance and functional mobility as result of her pain. Balaji will benefit from manual tx to address lumbar segmental restrictions an lumbar fascia restriction and good core/LE strengthening program - land based and aquatic based in order to improve her activity tolerance and quality of life.     Balaji Is progressing well towards her goals.   Patient prognosis is Good.     Patient will continue to benefit from skilled outpatient physical therapy to address the deficits listed in the problem list box on initial evaluation, provide pt/family education and to maximize pt's level of independence in the home and community environment.     Patient's spiritual, cultural and educational needs considered and pt agreeable to plan of care and goals.     Anticipated barriers to physical therapy: none     Goals:   Short Term Goals: 3 weeks   Demonstrate improvement in recent symptoms to progress toward long term goals   Correct postural deficits in standing/sitting to reduce pain and promote postural awareness for injury prevention.   Demonstrate compliance with initial exercise program      Long Term Goals: 6 weeks   Able to perform all household chores with no exacerbation of symptoms   Able to ambulate community required distances with no limitation   Able to ascend/descend 10 steps without limitation   Able to perform all transfers with no limitation   Improvement in LE strength by 1/2 grade   Improvement in FOTO score to > 60   Independent with HEP for continued improvement in function.     Plan     Plan of care Certification: 8/9/2024 to 11/9/2024.     Outpatient Physical Therapy 2 times weekly for 6 weeks to include the following interventions: Aquatic Therapy, Manual Therapy, Neuromuscular Re-ed, Patient Education, Therapeutic Activities, and  Therapeutic Exercise    Sofía Martines, PT

## 2024-09-03 ENCOUNTER — CLINICAL SUPPORT (OUTPATIENT)
Dept: REHABILITATION | Facility: HOSPITAL | Age: 74
End: 2024-09-03
Payer: MEDICARE

## 2024-09-03 DIAGNOSIS — Z74.09 IMPAIRED FUNCTIONAL MOBILITY, BALANCE, GAIT, AND ENDURANCE: Primary | ICD-10-CM

## 2024-09-03 PROCEDURE — 97112 NEUROMUSCULAR REEDUCATION: CPT | Mod: KX,PN

## 2024-09-03 PROCEDURE — 97110 THERAPEUTIC EXERCISES: CPT | Mod: KX,PN

## 2024-09-03 PROCEDURE — 97140 MANUAL THERAPY 1/> REGIONS: CPT | Mod: KX,PN

## 2024-09-03 NOTE — PROGRESS NOTES
[facility]   Physical Therapy Treatment Note      Name: Marleni Sousa  Clinic Number: [id number]    Therapy Diagnosis:   Encounter Diagnosis   Name Primary?    Impaired functional mobility, balance, gait, and endurance Yes     Physician: Harper Segovia PA-C    Visit Date: 9/3/2024    Physician Orders: PT Eval and Treat   Medical Diagnosis from Referral:   M54.16 (ICD-10-CM) - Radiculopathy, lumbar region  Evaluation Date: 8/9/2024  Authorization Period Expiration: 12/31/2024  Plan of Care Expiration: 11/9/2024  Progress Note Due: 9/30/2024  Date of Surgery: n/a  Visit # / Visits authorized: 6 / 12  FOTO: 1/ 3     Precautions: Standard      Time In: 9:35 AM  Time Out: 10:30 AM    Total Billable Time:  55  minutes    PTA Visit #: 0/5       Subjective     Patient reports:  got an MRI of her Left shoulder last week, ordered by Dr. Marti; hoping he can just refer her to therapy if there is not a tear;   She made an appointment with Dr. Woods just in case.  Has an appointment the end of September for her Left hand with MD at [facility]; Reports lumbar spine is doing better.   She was compliant with home exercise program.  Response to previous treatment:  my neck and back felt so much better   Functional change:  feels like she is able to move more without increased pain     Pain: 3-4/10  Location: left lumbar      Objective      Objective Measures updated at progress report unless specified.     Treatment     Balaji received the treatments listed below:      therapeutic exercises to develop strength, ROM, and core stabilization for 15 minutes including:  Nu-Step - Level 3 x 10 mins -unable to use LUE.   Heel Cord stretch on wedge 3 x 30 sec    manual therapy techniques: Joint mobilizations and Soft tissue Mobilization were applied to the: lumbar spine  for 25  minutes, including:  S/L flexion to bilateral lumbar spine segments, blocking of segment above for max facet opening;  "paraspinal skin rolling; STM lumbar fascia; Supine - sub-occipital inhibition with STM of occipitals with end range light traction       neuromuscular re-education activities to improve: Kinesthetic and Posture for 28 minutes. The following activities were included:  Early abd mm activation with red ball - pressure against knees 5" hold x 15  Posterior pelvic tilt x 15  Ball squeezes x 2 mins   Segmental bridges - red band around thighs x 15   DKC w/ PB x 2 min   LTR w/ PB x 2 min  Open book stretches x 10 each side  Clams - band around thighs x 15 each side     therapeutic activities to improve functional performance for   minutes, including:    Patient Education and Home Exercises       Education provided:   - Core mm education for better spine support     Written Home Exercises Provided: Yes. Exercises were reviewed and Balaji was able to demonstrate them prior to the end of the session.  Balaji demonstrated good  understanding of the education provided. See Electronic Medical Record under Patient Instructions for exercises provided during therapy sessions    Assessment   Balaji is responding well to therapy - lower back pain significantly improved since initiating therapy;  Needs improvement in core/hip girdle strength and mm activation for reduction in compression on spine and improved trunk support for daily activities.  Had MRI on left shoulder last week, waiting for call from Dr. Marti to hear of results.     Marleni is a 74 y.o. female referred to outpatient Physical Therapy with a medical diagnosis of Lumbar radiculopathy. Patient presents with chronic lumbar pain with recent exacerbation following last RFA.  Balaji demonstrates restriction in lumbar segmental motion, reduction in gluteal and LE strength leading to compression of lumbar spine, lumbar fascia tension and reduced activity tolerance and functional mobility as result of her pain. Balaji will benefit from manual tx to address lumbar segmental " restrictions an lumbar fascia restriction and good core/LE strengthening program - land based and aquatic based in order to improve her activity tolerance and quality of life.     Balaji Is progressing well towards her goals.   Patient prognosis is Good.     Patient will continue to benefit from skilled outpatient physical therapy to address the deficits listed in the problem list box on initial evaluation, provide pt/family education and to maximize pt's level of independence in the home and community environment.     Patient's spiritual, cultural and educational needs considered and pt agreeable to plan of care and goals.     Anticipated barriers to physical therapy: none     Goals:   Short Term Goals: 3 weeks   Demonstrate improvement in recent symptoms to progress toward long term goals   Correct postural deficits in standing/sitting to reduce pain and promote postural awareness for injury prevention.   Demonstrate compliance with initial exercise program      Long Term Goals: 6 weeks   Able to perform all household chores with no exacerbation of symptoms   Able to ambulate community required distances with no limitation   Able to ascend/descend 10 steps without limitation   Able to perform all transfers with no limitation   Improvement in LE strength by 1/2 grade   Improvement in FOTO score to > 60   Independent with HEP for continued improvement in function.     Plan     Plan of care Certification: 8/9/2024 to 11/9/2024.     Outpatient Physical Therapy 2 times weekly for 6 weeks to include the following interventions: Aquatic Therapy, Manual Therapy, Neuromuscular Re-ed, Patient Education, Therapeutic Activities, and Therapeutic Exercise    Sofía Martines, PT

## 2024-09-05 ENCOUNTER — CLINICAL SUPPORT (OUTPATIENT)
Dept: REHABILITATION | Facility: HOSPITAL | Age: 74
End: 2024-09-05
Payer: MEDICARE

## 2024-09-05 DIAGNOSIS — Z74.09 IMPAIRED FUNCTIONAL MOBILITY, BALANCE, GAIT, AND ENDURANCE: Primary | ICD-10-CM

## 2024-09-05 PROCEDURE — 97140 MANUAL THERAPY 1/> REGIONS: CPT | Mod: KX,PN

## 2024-09-05 PROCEDURE — 97112 NEUROMUSCULAR REEDUCATION: CPT | Mod: KX,PN

## 2024-09-05 PROCEDURE — 97110 THERAPEUTIC EXERCISES: CPT | Mod: PN

## 2024-09-05 NOTE — PROGRESS NOTES
"[facility]   Physical Therapy Treatment Note      Name: Marleni Sousa  Clinic Number: [id number]    Therapy Diagnosis:   Encounter Diagnosis   Name Primary?    Impaired functional mobility, balance, gait, and endurance Yes     Physician: Harper Segovia PA-C    Visit Date: 9/5/2024    Physician Orders: PT Eval and Treat   Medical Diagnosis from Referral:   M54.16 (ICD-10-CM) - Radiculopathy, lumbar region  Evaluation Date: 8/9/2024  Authorization Period Expiration: 12/31/2024  Plan of Care Expiration: 11/9/2024  Progress Note Due: 9/30/2024  Date of Surgery: n/a  Visit # / Visits authorized: 7 / 12  FOTO: 2/ 3     Precautions: Standard      Time In: 9:35 AM  Time Out: 10:28  AM    Total Billable Time:  55  minutes    PTA Visit #: 0/5       Subjective     Patient reports:  got an MRI of her Left shoulder last week - stated that Dr. Marti read it - partial tear of some of the RTC, "and some other things"  couldn't remember it all; said she would bring in the report net week. Hoping to just do some therapy for her arm.   She was compliant with home exercise program.  Response to previous treatment:  my neck and back felt so much better   Functional change:  feels like she is able to move more without increased pain     Pain: 3 /10  Location: left lumbar      Objective      Objective Measures updated at progress report unless specified.     Treatment     Balaji received the treatments listed below:      therapeutic exercises to develop strength, ROM, and core stabilization for 15 minutes including:  Recumbent Bike - 12 mins Level 4   Nu-Step - Level 3 x 10 mins -unable to use LUE.   Heel Cord stretch on wedge 3 x 30 sec    manual therapy techniques: Joint mobilizations and Soft tissue Mobilization were applied to the: lumbar spine  for 20  minutes, including:  S/L flexion to bilateral lumbar spine segments, blocking of segment above for max facet opening; paraspinal skin rolling;  " "sub-occipital inhibition with STM of occipitals with end range light traction; MET for tissue tightness right over left upper trap;       neuromuscular re-education activities to improve: Kinesthetic and Posture for 28 minutes. The following activities were included:  Early abd mm activation with red ball - pressure against knees 5" hold x 15  Posterior pelvic tilt x 15  Ball squeezes x 2 mins   Segmental bridges - blue  band around thighs x 15   DKC w/ PB x 2 min   LTR w/ PB x 2 min  Open book stretches x 10 each side  Clams - band around thighs x 15 each side     therapeutic activities to improve functional performance for   minutes, including:    Patient Education and Home Exercises       Education provided:   - Core mm education for better spine support     Written Home Exercises Provided: Yes. Exercises were reviewed and Balaji was able to demonstrate them prior to the end of the session.  Balaji demonstrated good  understanding of the education provided. See Electronic Medical Record under Patient Instructions for exercises provided during therapy sessions    Assessment   Balaji has responded well to therapy for her lumbar spine; able to perform most of her usual household activities without increased symptoms; Neck has been bothering her a little bit more - feels it's because of her left shoulder; Has appointment for second opinion on her Right hand in a couple of weeks.      Paste from Shirley Yepez is a 74 y.o. female referred to outpatient Physical Therapy with a medical diagnosis of Lumbar radiculopathy. Patient presents with chronic lumbar pain with recent exacerbation following last RFA.  Balaji demonstrates restriction in lumbar segmental motion, reduction in gluteal and LE strength leading to compression of lumbar spine, lumbar fascia tension and reduced activity tolerance and functional mobility as result of her pain. Balaji will benefit from manual tx to address lumbar segmental restrictions an lumbar fascia " restriction and good core/LE strengthening program - land based and aquatic based in order to improve her activity tolerance and quality of life.     Balaji Is progressing well towards her goals.   Patient prognosis is Good.     Patient will continue to benefit from skilled outpatient physical therapy to address the deficits listed in the problem list box on initial evaluation, provide pt/family education and to maximize pt's level of independence in the home and community environment.     Patient's spiritual, cultural and educational needs considered and pt agreeable to plan of care and goals.     Anticipated barriers to physical therapy: none     Goals:   Short Term Goals: 3 weeks   Demonstrate improvement in recent symptoms to progress toward long term goals   Correct postural deficits in standing/sitting to reduce pain and promote postural awareness for injury prevention.   Demonstrate compliance with initial exercise program      Long Term Goals: 6 weeks   Able to perform all household chores with no exacerbation of symptoms   Able to ambulate community required distances with no limitation   Able to ascend/descend 10 steps without limitation   Able to perform all transfers with no limitation   Improvement in LE strength by 1/2 grade   Improvement in FOTO score to > 60   Independent with HEP for continued improvement in function.     Plan     Plan of care Certification: 8/9/2024 to 11/9/2024.     Outpatient Physical Therapy 2 times weekly for 6 weeks to include the following interventions: Aquatic Therapy, Manual Therapy, Neuromuscular Re-ed, Patient Education, Therapeutic Activities, and Therapeutic Exercise    Sofía Martines, PT

## 2024-09-10 ENCOUNTER — CLINICAL SUPPORT (OUTPATIENT)
Dept: REHABILITATION | Facility: HOSPITAL | Age: 74
End: 2024-09-10
Payer: MEDICARE

## 2024-09-10 DIAGNOSIS — Z74.09 IMPAIRED FUNCTIONAL MOBILITY, BALANCE, GAIT, AND ENDURANCE: Primary | ICD-10-CM

## 2024-09-10 PROCEDURE — 97112 NEUROMUSCULAR REEDUCATION: CPT | Mod: KX,PN

## 2024-09-10 PROCEDURE — 97110 THERAPEUTIC EXERCISES: CPT | Mod: KX,PN

## 2024-09-10 NOTE — PROGRESS NOTES
[facility]   Physical Therapy Treatment Note      Name: Marleni Sousa  Clinic Number: [id number]    Therapy Diagnosis:   Encounter Diagnosis   Name Primary?    Impaired functional mobility, balance, gait, and endurance Yes     Physician: Harper Segovia PA-C    Visit Date: 9/10/2024    Physician Orders: PT Eval and Treat   Medical Diagnosis from Referral:   M54.16 (ICD-10-CM) - Radiculopathy, lumbar region  Evaluation Date: 8/9/2024  Authorization Period Expiration: 12/31/2024  Plan of Care Expiration: 11/9/2024  Progress Note Due: 9/30/2024  Date of Surgery: n/a  Visit # / Visits authorized: 7 / 12  FOTO: 2/ 3     Precautions: Standard      Time In: 9:30 AM  Time Out: 10:20  AM    Total Billable Time:  45  minutes    PTA Visit #: 0/5       Subjective     Patient reports:  back is feeling much better since starting therapy.  She was compliant with home exercise program.  Response to previous treatment:  my neck and back felt so much better   Functional change:  feels like she is able to move more without increased pain     Pain: 3 /10  Location: left lumbar      Objective      Objective Measures updated at progress report unless specified.     Treatment     Balaji received the treatments listed below:      therapeutic exercises to develop strength, ROM, and core stabilization for 15 minutes including:  Recumbent Bike - 12 mins Level 4   Nu-Step - Level 3 x 10 mins -unable to use LUE.   Heel Cord stretch on wedge 3 x 30 sec    manual therapy techniques: Joint mobilizations and Soft tissue Mobilization were applied to the: lumbar spine  for 00  minutes, including:  S/L flexion to bilateral lumbar spine segments, blocking of segment above for max facet opening; paraspinal skin rolling;  sub-occipital inhibition with STM of occipitals with end range light traction; MET for tissue tightness right over left upper trap;       neuromuscular re-education activities to improve: Kinesthetic  "and Posture for 28 minutes. The following activities were included:  Early abd mm activation with red ball - pressure against knees 5" hold x 15  Posterior pelvic tilt x 15  Ball squeezes x 2 mins   Segmental bridges - blue band around thighs x 15   DKC w/ PB x 2 min   LTR w/ PB x 2 min  Open book stretches x 10 each side  Clams - band around thighs x 15 each side     therapeutic activities to improve functional performance for   minutes, including:    Patient Education and Home Exercises       Education provided:   - Core mm education for better spine support     Written Home Exercises Provided: Yes. Exercises were reviewed and Balaji was able to demonstrate them prior to the end of the session.  Balaji demonstrated good  understanding of the education provided. See Electronic Medical Record under Patient Instructions for exercises provided during therapy sessions    Assessment   Balaji has responded well to therapy for her lumbar spine; able to perform most of her usual household activities without increased symptoms. Able to progress without complaint or provocation of symptoms. Continues to demonstrate core strength deficits and decreased lumbopelvic stability.       Paste from Shirley Benitezothy is a 74 y.o. female referred to outpatient Physical Therapy with a medical diagnosis of Lumbar radiculopathy. Patient presents with chronic lumbar pain with recent exacerbation following last RFA.  Balaji demonstrates restriction in lumbar segmental motion, reduction in gluteal and LE strength leading to compression of lumbar spine, lumbar fascia tension and reduced activity tolerance and functional mobility as result of her pain. Balaji will benefit from manual tx to address lumbar segmental restrictions an lumbar fascia restriction and good core/LE strengthening program - land based and aquatic based in order to improve her activity tolerance and quality of life.     Balaji Is progressing well towards her goals.   Patient prognosis is " Good.     Patient will continue to benefit from skilled outpatient physical therapy to address the deficits listed in the problem list box on initial evaluation, provide pt/family education and to maximize pt's level of independence in the home and community environment.     Patient's spiritual, cultural and educational needs considered and pt agreeable to plan of care and goals.     Anticipated barriers to physical therapy: none     Goals:   Short Term Goals: 3 weeks   Demonstrate improvement in recent symptoms to progress toward long term goals   Correct postural deficits in standing/sitting to reduce pain and promote postural awareness for injury prevention.   Demonstrate compliance with initial exercise program      Long Term Goals: 6 weeks   Able to perform all household chores with no exacerbation of symptoms   Able to ambulate community required distances with no limitation   Able to ascend/descend 10 steps without limitation   Able to perform all transfers with no limitation   Improvement in LE strength by 1/2 grade   Improvement in FOTO score to > 60   Independent with HEP for continued improvement in function.     Plan     Plan of care Certification: 8/9/2024 to 11/9/2024.     Outpatient Physical Therapy 2 times weekly for 6 weeks to include the following interventions: Aquatic Therapy, Manual Therapy, Neuromuscular Re-ed, Patient Education, Therapeutic Activities, and Therapeutic Exercise    Berta Houston, PT

## 2024-09-19 ENCOUNTER — CLINICAL SUPPORT (OUTPATIENT)
Dept: REHABILITATION | Facility: HOSPITAL | Age: 74
End: 2024-09-19
Payer: MEDICARE

## 2024-09-19 DIAGNOSIS — Z74.09 IMPAIRED FUNCTIONAL MOBILITY, BALANCE, GAIT, AND ENDURANCE: Primary | ICD-10-CM

## 2024-09-19 PROCEDURE — 97110 THERAPEUTIC EXERCISES: CPT | Mod: KX,PN

## 2024-09-19 PROCEDURE — 97112 NEUROMUSCULAR REEDUCATION: CPT | Mod: KX,PN

## 2024-09-19 NOTE — PROGRESS NOTES
[facility]   Physical Therapy Treatment Note      Name: Marleni Sousa  Clinic Number: [id number]    Therapy Diagnosis:   No diagnosis found.    Physician: Harper Segovia PA-C    Visit Date: 9/19/2024    Physician Orders: PT Eval and Treat   Medical Diagnosis from Referral:   M54.16 (ICD-10-CM) - Radiculopathy, lumbar region  Evaluation Date: 8/9/2024  Authorization Period Expiration: 12/31/2024  Plan of Care Expiration: 11/9/2024  Progress Note Due: 9/30/2024  Date of Surgery: n/a  Visit # / Visits authorized: 9 / 12  FOTO: 2/ 3     Precautions: Standard      Time In: 8:00  AM  Time Out: 8:45   AM    Total Billable Time:  45  minutes    PTA Visit #: 0/5       Subjective     Patient reports:  back is feeling much better since starting therapy. Left shoulder is really bothering her - went to see ortho yesterday - received steroid injection; wants her to start therapy.   She was compliant with home exercise program.  Response to previous treatment:  my neck and back felt so much better   Functional change:  feels like she is able to move more without increased pain     Pain: 3 /10  Location: left lumbar      Objective      Objective Measures updated at progress report unless specified.     Treatment     Balaji received the treatments listed below:      therapeutic exercises to develop strength, ROM, and core stabilization for 15 minutes including:  Recumbent Bike - 12 mins Level 4   Nu-Step - Level 3 x 10 mins -unable to use LUE.   Heel Cord stretch on wedge 3 x 30 sec    manual therapy techniques: Joint mobilizations and Soft tissue Mobilization were applied to the: lumbar spine  for 0 minutes, including:  S/L flexion to bilateral lumbar spine segments, blocking of segment above for max facet opening; paraspinal skin rolling;  sub-occipital inhibition with STM of occipitals with end range light traction; MET for tissue tightness right over left upper trap;       neuromuscular  "re-education activities to improve: Kinesthetic and Posture for 28 minutes. The following activities were included:  Early abd mm activation with red ball - pressure against knees 5" hold x 15  Posterior pelvic tilt x 15  Ball squeezes x 2 mins   Segmental bridges - blue band around thighs x 15   DKC w/ PB x 2 min   LTR w/ PB x 2 min  Open book stretches x 10 each side  Clams - band around thighs x 15 each side     therapeutic activities to improve functional performance for   minutes, including:    Patient Education and Home Exercises       Education provided:   - Core mm education for better spine support     Written Home Exercises Provided: Yes. Exercises were reviewed and Balaji was able to demonstrate them prior to the end of the session.  Balaji demonstrated good  understanding of the education provided. See Electronic Medical Record under Patient Instructions for exercises provided during therapy sessions    Assessment   Balaji has responded well to therapy for her lumbar spine; able to perform most of her usual household activities without increased symptoms. Able to progress without complaint or provocation of symptoms. Continues to demonstrate core strength deficits and decreased lumbopelvic stability.       Paste from [place] Yepez is a 74 y.o. female referred to outpatient Physical Therapy with a medical diagnosis of Lumbar radiculopathy. Patient presents with chronic lumbar pain with recent exacerbation following last RFA.  Balaji demonstrates restriction in lumbar segmental motion, reduction in gluteal and LE strength leading to compression of lumbar spine, lumbar fascia tension and reduced activity tolerance and functional mobility as result of her pain. Balaji will benefit from manual tx to address lumbar segmental restrictions an lumbar fascia restriction and good core/LE strengthening program - land based and aquatic based in order to improve her activity tolerance and quality of life.     Balaji Is " progressing well towards her goals.   Patient prognosis is Good.     Patient will continue to benefit from skilled outpatient physical therapy to address the deficits listed in the problem list box on initial evaluation, provide pt/family education and to maximize pt's level of independence in the home and community environment.     Patient's spiritual, cultural and educational needs considered and pt agreeable to plan of care and goals.     Anticipated barriers to physical therapy: none     Goals:   Short Term Goals: 3 weeks   Demonstrate improvement in recent symptoms to progress toward long term goals   Correct postural deficits in standing/sitting to reduce pain and promote postural awareness for injury prevention.   Demonstrate compliance with initial exercise program      Long Term Goals: 6 weeks   Able to perform all household chores with no exacerbation of symptoms   Able to ambulate community required distances with no limitation   Able to ascend/descend 10 steps without limitation   Able to perform all transfers with no limitation   Improvement in LE strength by 1/2 grade   Improvement in FOTO score to > 60   Independent with HEP for continued improvement in function.     Plan     Plan of care Certification: 8/9/2024 to 11/9/2024.     Outpatient Physical Therapy 2 times weekly for 6 weeks to include the following interventions: Aquatic Therapy, Manual Therapy, Neuromuscular Re-ed, Patient Education, Therapeutic Activities, and Therapeutic Exercise    Sofía Martines, PT

## 2024-09-26 ENCOUNTER — CLINICAL SUPPORT (OUTPATIENT)
Dept: REHABILITATION | Facility: HOSPITAL | Age: 74
End: 2024-09-26
Payer: MEDICARE

## 2024-09-26 DIAGNOSIS — Z74.09 IMPAIRED FUNCTIONAL MOBILITY, BALANCE, GAIT, AND ENDURANCE: Primary | ICD-10-CM

## 2024-09-26 PROCEDURE — 97140 MANUAL THERAPY 1/> REGIONS: CPT | Mod: PN

## 2024-09-26 PROCEDURE — 97110 THERAPEUTIC EXERCISES: CPT | Mod: PN

## 2024-09-26 PROCEDURE — 97112 NEUROMUSCULAR REEDUCATION: CPT | Mod: PN

## 2024-09-26 NOTE — PROGRESS NOTES
[facility]   Physical Therapy Treatment Note  / Discharge note      Name: Marleni Sousa  Clinic Number: [id number]    Therapy Diagnosis:   Encounter Diagnosis   Name Primary?    Impaired functional mobility, balance, gait, and endurance Yes       Physician: Harper Segovia PA-C    Visit Date: 9/26/2024    Physician Orders: PT Eval and Treat   Medical Diagnosis from Referral:   M54.16 (ICD-10-CM) - Radiculopathy, lumbar region  Evaluation Date: 8/9/2024  Authorization Period Expiration: 12/31/2024  Plan of Care Expiration: 11/9/2024  Progress Note Due: 9/30/2024  Date of Surgery: n/a  Visit # / Visits authorized: 10  / 12  FOTO: 2/ 3     Precautions: Standard      Time In: 10:30   AM  Time Out: 11:15  AM    Total Billable Time:  45  minutes    PTA Visit #: 0/5       Subjective     Patient reports:  back is feeling much better since starting therapy. Last PT visit today; patient feels she is ready for discharge.   She was compliant with home exercise program.  Response to previous treatment:  my neck and back felt so much better   Functional change:  feels like she is able to move more without increased pain     Pain: 3-4 /10  Location: left lumbar      Objective      Objective Measures updated at progress report unless specified.    Lumbar Range of Motion:     % of Normal Range Pain   Flexion 25 - hands to ankles  Hinging at T-L junction    Extension 10     Left Side Bending 10 Painful, limited    Right Side Bending 25      Left rotation 40     Right Rotation 35 Tightness left side       Lower Extremity Strength    Left Right   Knee extension: 4-/5 4-/5   Knee flexion: 3+/5 3+/5   Hip flexion: 4/5 4/5   Hip extension:  3+/5 3+/5   Hip abduction: 4-/5 4-/5   Hip adduction: 3+/5 3+/5   Ankle dorsiflexion: 4-/5  4/5   Ankle plantarflexion: 3+/5 3+/5   Upper abdominals 3+/5     Lower abdominals 3/5     Back extensors 3/5          Treatment     Balaji received the treatments listed below:  "     therapeutic exercises to develop strength, ROM, and core stabilization for 15 minutes including:  Recumbent Bike - 12 mins Level 4   Nu-Step - Level 3 x 10 mins -unable to use LUE.   Heel Cord stretch on wedge 3 x 30 sec    manual therapy techniques: Joint mobilizations and Soft tissue Mobilization were applied to the: lumbar spine  for 10 minutes, including:  S/L flexion to bilateral lumbar spine segments, blocking of segment above for max facet opening; paraspinal skin rolling;  sub-occipital inhibition with STM of occipitals with end range light traction; MET for tissue tightness right over left upper trap;       neuromuscular re-education activities to improve: Kinesthetic and Posture for 28 minutes. The following activities were included:  Early abd mm activation with red ball - pressure against knees 5" hold x 15  Posterior pelvic tilt x 15  Ball squeezes x 2 mins   Segmental bridges - blue band around thighs x 15   DKC w/ PB x 2 min   LTR w/ PB x 2 min  Open book stretches x 10 each side  Clams - band around thighs x 15 each side     therapeutic activities to improve functional performance for   minutes, including:    Patient Education and Home Exercises       Education provided:   - Core mm education for better spine support  - reviewed HEP with patient today for continued use at home     Written Home Exercises Provided: Yes. Exercises were reviewed and Balaji was able to demonstrate them prior to the end of the session.  Balaji demonstrated good  understanding of the education provided. See Electronic Medical Record under Patient Instructions for exercises provided during therapy sessions    Assessment   Balaji has responded well to therapy for her lumbar spine; able to perform most of her usual household activities without increased symptoms. Able to progress without complaint or provocation of symptoms. Continues to demonstrate core strength deficits, but improved from evaluation.  She is heading out to [place] " for  of her brother.  Will continue with HEP on her own, feels like her back has improved, no longer having pain with general activities.  Will return for OT to address shoulder pathology upon her return from [place].   Ready for discharge from PT at this time.     Anthony Yepez is a 74 y.o. female referred to outpatient Physical Therapy with a medical diagnosis of Lumbar radiculopathy. Patient presents with chronic lumbar pain with recent exacerbation following last RFA.  Balaji demonstrates restriction in lumbar segmental motion, reduction in gluteal and LE strength leading to compression of lumbar spine, lumbar fascia tension and reduced activity tolerance and functional mobility as result of her pain. Balaji will benefit from manual tx to address lumbar segmental restrictions an lumbar fascia restriction and good core/LE strengthening program - land based and aquatic based in order to improve her activity tolerance and quality of life.     Balaji Is progressing well towards her goals.   Patient prognosis is Good.     Patient will continue to benefit from skilled outpatient physical therapy to address the deficits listed in the problem list box on initial evaluation, provide pt/family education and to maximize pt's level of independence in the home and community environment.     Patient's spiritual, cultural and educational needs considered and pt agreeable to plan of care and goals.     Anticipated barriers to physical therapy: none     Goals:   Short Term Goals: 3 weeks   Demonstrate improvement in recent symptoms to progress toward long term goals  > MET   Correct postural deficits in standing/sitting to reduce pain and promote postural awareness for injury prevention. > MET    Demonstrate compliance with initial exercise program  > MET      Long Term Goals: 6 weeks   Able to perform all household chores with no exacerbation of symptoms  > Good improvement   Able to ambulate community required distances  with no limitation  > Over 1 mile   Able to ascend/descend 10 steps without limitation  > MET   Able to perform all transfers with no limitation  > MET   Improvement in LE strength by 1/2 grade  > Progress noted   Improvement in FOTO score to > 60  > MET   Independent with HEP for continued improvement in function.  > MET     Plan     Discharge from physical therapy     Sofía Martines, PT

## 2024-10-18 ENCOUNTER — TELEPHONE (OUTPATIENT)
Dept: ORTHOPEDICS | Facility: CLINIC | Age: 74
End: 2024-10-18
Payer: MEDICARE

## 2024-10-18 NOTE — TELEPHONE ENCOUNTER
Patient communication     Notified patient to stop at [place] [facility] - 1st floor check in [address] [facility] [address] 30 minutes prior to your appointment time on 10/21/24 with Dr. Thomson for x-rays.     Made them aware that this is not a scheduled xray appointment and they might be running behind as they are considered a walk-in xray.    Verbalized the Following:  *Please arrive at your informed time above, if you are more than 15 Minutes late to your appointment with Dr. Thomson we will have to reschedule your appointment. This will allow you to be seen in a timely manor and be conscious to other patients being seen that same day*